# Patient Record
Sex: FEMALE | Race: WHITE | Employment: OTHER | URBAN - NONMETROPOLITAN AREA
[De-identification: names, ages, dates, MRNs, and addresses within clinical notes are randomized per-mention and may not be internally consistent; named-entity substitution may affect disease eponyms.]

---

## 2017-01-10 ENCOUNTER — OFFICE VISIT (OUTPATIENT)
Dept: INTERNAL MEDICINE CLINIC | Age: 77
End: 2017-01-10

## 2017-01-10 ENCOUNTER — TELEPHONE (OUTPATIENT)
Dept: INTERNAL MEDICINE CLINIC | Age: 77
End: 2017-01-10

## 2017-01-10 VITALS
SYSTOLIC BLOOD PRESSURE: 134 MMHG | OXYGEN SATURATION: 98 % | HEIGHT: 63 IN | DIASTOLIC BLOOD PRESSURE: 74 MMHG | HEART RATE: 68 BPM | WEIGHT: 135 LBS | BODY MASS INDEX: 23.92 KG/M2 | TEMPERATURE: 98.1 F | RESPIRATION RATE: 12 BRPM

## 2017-01-10 DIAGNOSIS — I49.3 PVCS (PREMATURE VENTRICULAR CONTRACTIONS): ICD-10-CM

## 2017-01-10 DIAGNOSIS — I82.4Z2 ACUTE DEEP VEIN THROMBOSIS (DVT) OF DISTAL VEIN OF LEFT LOWER EXTREMITY (HCC): ICD-10-CM

## 2017-01-10 DIAGNOSIS — N83.201 CYST OF RIGHT OVARY: ICD-10-CM

## 2017-01-10 DIAGNOSIS — K92.1 HEMATOCHEZIA: ICD-10-CM

## 2017-01-10 DIAGNOSIS — E78.2 MIXED HYPERLIPIDEMIA: ICD-10-CM

## 2017-01-10 DIAGNOSIS — K76.89 LIVER DYSFUNCTION: ICD-10-CM

## 2017-01-10 DIAGNOSIS — E03.9 ACQUIRED HYPOTHYROIDISM: ICD-10-CM

## 2017-01-10 DIAGNOSIS — I10 ESSENTIAL HYPERTENSION: ICD-10-CM

## 2017-01-10 DIAGNOSIS — A04.72 C. DIFFICILE DIARRHEA: ICD-10-CM

## 2017-01-10 DIAGNOSIS — R10.30 LOWER ABDOMINAL PAIN: Primary | ICD-10-CM

## 2017-01-10 PROCEDURE — 99214 OFFICE O/P EST MOD 30 MIN: CPT | Performed by: INTERNAL MEDICINE

## 2017-01-10 RX ORDER — ATORVASTATIN CALCIUM 10 MG/1
TABLET, FILM COATED ORAL
Qty: 90 TABLET | Refills: 1 | Status: SHIPPED | OUTPATIENT
Start: 2017-01-10 | End: 2017-05-16 | Stop reason: SDUPTHER

## 2017-01-10 RX ORDER — LEVOTHYROXINE SODIUM 0.05 MG/1
TABLET ORAL
Qty: 90 TABLET | Refills: 1 | Status: SHIPPED | OUTPATIENT
Start: 2017-01-10 | End: 2017-05-16 | Stop reason: SDUPTHER

## 2017-01-10 RX ORDER — BENAZEPRIL HYDROCHLORIDE 20 MG/1
TABLET ORAL
Qty: 90 TABLET | Refills: 1 | Status: SHIPPED | OUTPATIENT
Start: 2017-01-10 | End: 2017-05-16 | Stop reason: SDUPTHER

## 2017-01-10 RX ORDER — DICYCLOMINE HCL 20 MG
20 TABLET ORAL 2 TIMES DAILY PRN
Qty: 60 TABLET | Refills: 2 | Status: SHIPPED | OUTPATIENT
Start: 2017-01-10 | End: 2017-11-28 | Stop reason: SDUPTHER

## 2017-01-10 ASSESSMENT — ENCOUNTER SYMPTOMS
SHORTNESS OF BREATH: 0
VOMITING: 0
RESPIRATORY NEGATIVE: 1
ABDOMINAL PAIN: 1
COUGH: 0
DIARRHEA: 0
BLOOD IN STOOL: 1
EYES NEGATIVE: 1
NAUSEA: 0

## 2017-01-13 ENCOUNTER — HOSPITAL ENCOUNTER (OUTPATIENT)
Dept: ULTRASOUND IMAGING | Age: 77
Discharge: HOME | End: 2017-01-13
Attending: INTERNAL MEDICINE | Admitting: INTERNAL MEDICINE

## 2017-01-13 DIAGNOSIS — I82.4Z2 ACUTE DEEP VEIN THROMBOSIS OF LOWER LEG, LEFT (HCC): ICD-10-CM

## 2017-01-13 DIAGNOSIS — I82.4Z2 ACUTE EMBOLISM AND THROMBOSIS OF DEEP VEIN OF LEFT DISTAL LOWER EXTREMITY (HCC): ICD-10-CM

## 2017-02-11 ENCOUNTER — HOSPITAL ENCOUNTER (OUTPATIENT)
Dept: LAB | Age: 77
Discharge: HOME | End: 2017-02-11
Attending: INTERNAL MEDICINE | Admitting: INTERNAL MEDICINE

## 2017-02-11 LAB
ALBUMIN SERPL-MCNC: 4.3 GM/DL (ref 3.4–5)
ALP BLD-CCNC: 63 IU/L (ref 40–129)
ALT SERPL-CCNC: 23 U/L (ref 10–40)
ANION GAP SERPL CALCULATED.3IONS-SCNC: 15 MMOL/L (ref 4–16)
AST SERPL-CCNC: 30 IU/L (ref 15–37)
BASOPHILS ABSOLUTE: 0.1 K/CU MM
BASOPHILS RELATIVE PERCENT: 1 % (ref 0–1)
BILIRUB SERPL-MCNC: 0.6 MG/DL (ref 0–1)
BUN BLDV-MCNC: 18 MG/DL (ref 6–23)
CALCIUM SERPL-MCNC: 9.9 MG/DL (ref 8.3–10.6)
CHLORIDE BLD-SCNC: 102 MMOL/L (ref 99–110)
CHOLESTEROL: 137 MG/DL
CO2: 22 MMOL/L (ref 21–32)
CREAT SERPL-MCNC: 0.9 MG/DL (ref 0.6–1.1)
DIFFERENTIAL TYPE: ABNORMAL
EOSINOPHILS ABSOLUTE: 0.3 K/CU MM
EOSINOPHILS RELATIVE PERCENT: 5.6 % (ref 0–3)
GFR AFRICAN AMERICAN: >60 ML/MIN/1.73M2
GFR NON-AFRICAN AMERICAN: >60 ML/MIN/1.73M2
GLUCOSE FASTING: 103 MG/DL (ref 70–99)
HCT VFR BLD CALC: 41.6 % (ref 37–47)
HDLC SERPL-MCNC: 76 MG/DL
HEMOGLOBIN: 13.5 GM/DL (ref 12.5–16)
IMMATURE NEUTROPHIL %: 0 % (ref 0–0.43)
LDL CHOLESTEROL DIRECT: 61 MG/DL
LYMPHOCYTES ABSOLUTE: 2.2 K/CU MM
LYMPHOCYTES RELATIVE PERCENT: 41.9 % (ref 24–44)
MCH RBC QN AUTO: 29.9 PG (ref 27–31)
MCHC RBC AUTO-ENTMCNC: 32.5 % (ref 32–36)
MCV RBC AUTO: 92.2 FL (ref 78–100)
MONOCYTES ABSOLUTE: 0.3 K/CU MM
MONOCYTES RELATIVE PERCENT: 6.6 % (ref 0–4)
PDW BLD-RTO: 13.2 % (ref 11.7–14.9)
PLATELET # BLD: 218 K/CU MM (ref 140–440)
PMV BLD AUTO: 11.1 FL (ref 7.5–11.1)
POTASSIUM SERPL-SCNC: 4.4 MMOL/L (ref 3.5–5.1)
RBC # BLD: 4.51 M/CU MM (ref 4.2–5.4)
SEGMENTED NEUTROPHILS ABSOLUTE COUNT: 2.3 K/CU MM
SEGMENTED NEUTROPHILS RELATIVE PERCENT: 44.9 % (ref 36–66)
SODIUM BLD-SCNC: 139 MMOL/L (ref 135–145)
TOTAL IMMATURE NEUTOROPHIL: 0 K/CU MM
TOTAL PROTEIN: 7 GM/DL (ref 6.4–8.2)
TRIGL SERPL-MCNC: 62 MG/DL
WBC # BLD: 5.2 K/CU MM (ref 4–10.5)

## 2017-02-14 ENCOUNTER — OFFICE VISIT (OUTPATIENT)
Dept: INTERNAL MEDICINE CLINIC | Age: 77
End: 2017-02-14

## 2017-02-14 VITALS
DIASTOLIC BLOOD PRESSURE: 80 MMHG | RESPIRATION RATE: 16 BRPM | SYSTOLIC BLOOD PRESSURE: 124 MMHG | WEIGHT: 136.8 LBS | OXYGEN SATURATION: 98 % | BODY MASS INDEX: 24.23 KG/M2 | TEMPERATURE: 98 F | HEART RATE: 68 BPM

## 2017-02-14 DIAGNOSIS — E03.9 ACQUIRED HYPOTHYROIDISM: ICD-10-CM

## 2017-02-14 DIAGNOSIS — K52.9 COLITIS: ICD-10-CM

## 2017-02-14 DIAGNOSIS — E78.2 MIXED HYPERLIPIDEMIA: ICD-10-CM

## 2017-02-14 DIAGNOSIS — I10 ESSENTIAL HYPERTENSION: ICD-10-CM

## 2017-02-14 DIAGNOSIS — K76.89 LIVER DYSFUNCTION: ICD-10-CM

## 2017-02-14 DIAGNOSIS — Z23 NEED FOR PROPHYLACTIC VACCINATION AGAINST STREPTOCOCCUS PNEUMONIAE (PNEUMOCOCCUS) AND INFLUENZA: ICD-10-CM

## 2017-02-14 DIAGNOSIS — I82.4Z2 ACUTE DEEP VEIN THROMBOSIS (DVT) OF DISTAL VEIN OF LEFT LOWER EXTREMITY (HCC): Primary | ICD-10-CM

## 2017-02-14 PROCEDURE — G8484 FLU IMMUNIZE NO ADMIN: HCPCS | Performed by: INTERNAL MEDICINE

## 2017-02-14 PROCEDURE — 1090F PRES/ABSN URINE INCON ASSESS: CPT | Performed by: INTERNAL MEDICINE

## 2017-02-14 PROCEDURE — G8399 PT W/DXA RESULTS DOCUMENT: HCPCS | Performed by: INTERNAL MEDICINE

## 2017-02-14 PROCEDURE — G8420 CALC BMI NORM PARAMETERS: HCPCS | Performed by: INTERNAL MEDICINE

## 2017-02-14 PROCEDURE — G0009 ADMIN PNEUMOCOCCAL VACCINE: HCPCS | Performed by: INTERNAL MEDICINE

## 2017-02-14 PROCEDURE — 4040F PNEUMOC VAC/ADMIN/RCVD: CPT | Performed by: INTERNAL MEDICINE

## 2017-02-14 PROCEDURE — G8427 DOCREV CUR MEDS BY ELIG CLIN: HCPCS | Performed by: INTERNAL MEDICINE

## 2017-02-14 PROCEDURE — 90670 PCV13 VACCINE IM: CPT | Performed by: INTERNAL MEDICINE

## 2017-02-14 PROCEDURE — 1036F TOBACCO NON-USER: CPT | Performed by: INTERNAL MEDICINE

## 2017-02-14 PROCEDURE — 1123F ACP DISCUSS/DSCN MKR DOCD: CPT | Performed by: INTERNAL MEDICINE

## 2017-02-14 PROCEDURE — 99213 OFFICE O/P EST LOW 20 MIN: CPT | Performed by: INTERNAL MEDICINE

## 2017-02-14 ASSESSMENT — ENCOUNTER SYMPTOMS
COUGH: 0
DIARRHEA: 0
ABDOMINAL PAIN: 1
SHORTNESS OF BREATH: 0
HEMOPTYSIS: 0
CONSTIPATION: 0
RESPIRATORY NEGATIVE: 1
EYES NEGATIVE: 1

## 2017-05-16 ENCOUNTER — TELEPHONE (OUTPATIENT)
Dept: INTERNAL MEDICINE CLINIC | Age: 77
End: 2017-05-16

## 2017-05-16 ENCOUNTER — OFFICE VISIT (OUTPATIENT)
Dept: INTERNAL MEDICINE CLINIC | Age: 77
End: 2017-05-16

## 2017-05-16 VITALS
TEMPERATURE: 98 F | BODY MASS INDEX: 24.56 KG/M2 | SYSTOLIC BLOOD PRESSURE: 120 MMHG | RESPIRATION RATE: 12 BRPM | HEIGHT: 63 IN | HEART RATE: 64 BPM | OXYGEN SATURATION: 96 % | DIASTOLIC BLOOD PRESSURE: 64 MMHG | WEIGHT: 138.6 LBS

## 2017-05-16 DIAGNOSIS — I82.4Z2 ACUTE DEEP VEIN THROMBOSIS (DVT) OF DISTAL VEIN OF LEFT LOWER EXTREMITY (HCC): ICD-10-CM

## 2017-05-16 DIAGNOSIS — N83.201 CYST OF RIGHT OVARY: ICD-10-CM

## 2017-05-16 DIAGNOSIS — K52.9 COLITIS: ICD-10-CM

## 2017-05-16 DIAGNOSIS — E78.2 MIXED HYPERLIPIDEMIA: ICD-10-CM

## 2017-05-16 DIAGNOSIS — I10 ESSENTIAL HYPERTENSION: Primary | ICD-10-CM

## 2017-05-16 DIAGNOSIS — I49.3 PVCS (PREMATURE VENTRICULAR CONTRACTIONS): ICD-10-CM

## 2017-05-16 DIAGNOSIS — Z86.79 H/O MITRAL VALVE PROLAPSE: ICD-10-CM

## 2017-05-16 DIAGNOSIS — E03.9 ACQUIRED HYPOTHYROIDISM: ICD-10-CM

## 2017-05-16 DIAGNOSIS — K76.89 LIVER DYSFUNCTION: ICD-10-CM

## 2017-05-16 PROCEDURE — 1123F ACP DISCUSS/DSCN MKR DOCD: CPT | Performed by: INTERNAL MEDICINE

## 2017-05-16 PROCEDURE — G8399 PT W/DXA RESULTS DOCUMENT: HCPCS | Performed by: INTERNAL MEDICINE

## 2017-05-16 PROCEDURE — 1090F PRES/ABSN URINE INCON ASSESS: CPT | Performed by: INTERNAL MEDICINE

## 2017-05-16 PROCEDURE — G8427 DOCREV CUR MEDS BY ELIG CLIN: HCPCS | Performed by: INTERNAL MEDICINE

## 2017-05-16 PROCEDURE — 1036F TOBACCO NON-USER: CPT | Performed by: INTERNAL MEDICINE

## 2017-05-16 PROCEDURE — 99213 OFFICE O/P EST LOW 20 MIN: CPT | Performed by: INTERNAL MEDICINE

## 2017-05-16 PROCEDURE — 4040F PNEUMOC VAC/ADMIN/RCVD: CPT | Performed by: INTERNAL MEDICINE

## 2017-05-16 PROCEDURE — G8420 CALC BMI NORM PARAMETERS: HCPCS | Performed by: INTERNAL MEDICINE

## 2017-05-16 RX ORDER — BENAZEPRIL HYDROCHLORIDE 20 MG/1
TABLET ORAL
Qty: 90 TABLET | Refills: 1 | Status: SHIPPED | OUTPATIENT
Start: 2017-05-16 | End: 2017-08-22 | Stop reason: SDUPTHER

## 2017-05-16 RX ORDER — ATORVASTATIN CALCIUM 10 MG/1
TABLET, FILM COATED ORAL
Qty: 90 TABLET | Refills: 1 | Status: SHIPPED | OUTPATIENT
Start: 2017-05-16 | End: 2017-08-22 | Stop reason: SDUPTHER

## 2017-05-16 RX ORDER — LEVOTHYROXINE SODIUM 0.05 MG/1
TABLET ORAL
Qty: 90 TABLET | Refills: 1 | Status: SHIPPED | OUTPATIENT
Start: 2017-05-16 | End: 2017-08-22 | Stop reason: SDUPTHER

## 2017-05-16 ASSESSMENT — PATIENT HEALTH QUESTIONNAIRE - PHQ9
SUM OF ALL RESPONSES TO PHQ QUESTIONS 1-9: 0
SUM OF ALL RESPONSES TO PHQ9 QUESTIONS 1 & 2: 0
1. LITTLE INTEREST OR PLEASURE IN DOING THINGS: 0
2. FEELING DOWN, DEPRESSED OR HOPELESS: 0

## 2017-05-16 ASSESSMENT — ENCOUNTER SYMPTOMS
SHORTNESS OF BREATH: 0
RESPIRATORY NEGATIVE: 1
GASTROINTESTINAL NEGATIVE: 1
HEMOPTYSIS: 0
EYES NEGATIVE: 1
COUGH: 0

## 2017-06-05 ENCOUNTER — OFFICE VISIT (OUTPATIENT)
Dept: CARDIOLOGY CLINIC | Age: 77
End: 2017-06-05

## 2017-06-05 VITALS
BODY MASS INDEX: 21.69 KG/M2 | RESPIRATION RATE: 16 BRPM | HEIGHT: 66 IN | DIASTOLIC BLOOD PRESSURE: 70 MMHG | HEART RATE: 68 BPM | SYSTOLIC BLOOD PRESSURE: 122 MMHG | WEIGHT: 135 LBS

## 2017-06-05 DIAGNOSIS — Z86.79 H/O MITRAL VALVE PROLAPSE: ICD-10-CM

## 2017-06-05 DIAGNOSIS — I49.3 PVCS (PREMATURE VENTRICULAR CONTRACTIONS): ICD-10-CM

## 2017-06-05 DIAGNOSIS — R42 DIZZY SPELLS: Primary | ICD-10-CM

## 2017-06-05 DIAGNOSIS — R00.2 HEART PALPITATIONS: ICD-10-CM

## 2017-06-05 DIAGNOSIS — E78.2 MIXED HYPERLIPIDEMIA: ICD-10-CM

## 2017-06-05 DIAGNOSIS — I10 ESSENTIAL HYPERTENSION: ICD-10-CM

## 2017-06-05 PROCEDURE — 4040F PNEUMOC VAC/ADMIN/RCVD: CPT | Performed by: INTERNAL MEDICINE

## 2017-06-05 PROCEDURE — 93270 REMOTE 30 DAY ECG REV/REPORT: CPT | Performed by: INTERNAL MEDICINE

## 2017-06-05 PROCEDURE — 99214 OFFICE O/P EST MOD 30 MIN: CPT | Performed by: INTERNAL MEDICINE

## 2017-06-05 PROCEDURE — G8399 PT W/DXA RESULTS DOCUMENT: HCPCS | Performed by: INTERNAL MEDICINE

## 2017-06-05 PROCEDURE — 93000 ELECTROCARDIOGRAM COMPLETE: CPT | Performed by: INTERNAL MEDICINE

## 2017-06-05 PROCEDURE — 1036F TOBACCO NON-USER: CPT | Performed by: INTERNAL MEDICINE

## 2017-06-05 PROCEDURE — G8419 CALC BMI OUT NRM PARAM NOF/U: HCPCS | Performed by: INTERNAL MEDICINE

## 2017-06-05 PROCEDURE — 1123F ACP DISCUSS/DSCN MKR DOCD: CPT | Performed by: INTERNAL MEDICINE

## 2017-06-05 PROCEDURE — G8427 DOCREV CUR MEDS BY ELIG CLIN: HCPCS | Performed by: INTERNAL MEDICINE

## 2017-06-05 PROCEDURE — 1090F PRES/ABSN URINE INCON ASSESS: CPT | Performed by: INTERNAL MEDICINE

## 2017-07-10 PROCEDURE — 93272 ECG/REVIEW INTERPRET ONLY: CPT | Performed by: INTERNAL MEDICINE

## 2017-08-01 ENCOUNTER — PROCEDURE VISIT (OUTPATIENT)
Dept: CARDIOLOGY CLINIC | Age: 77
End: 2017-08-01

## 2017-08-01 ENCOUNTER — OFFICE VISIT (OUTPATIENT)
Dept: CARDIOLOGY CLINIC | Age: 77
End: 2017-08-01

## 2017-08-01 ENCOUNTER — HOSPITAL ENCOUNTER (OUTPATIENT)
Dept: GENERAL RADIOLOGY | Age: 77
Discharge: HOME | End: 2017-08-01
Attending: INTERNAL MEDICINE | Admitting: INTERNAL MEDICINE

## 2017-08-01 VITALS
BODY MASS INDEX: 24.27 KG/M2 | SYSTOLIC BLOOD PRESSURE: 120 MMHG | DIASTOLIC BLOOD PRESSURE: 60 MMHG | HEIGHT: 63 IN | RESPIRATION RATE: 16 BRPM | WEIGHT: 137 LBS | HEART RATE: 66 BPM

## 2017-08-01 DIAGNOSIS — Z86.79 H/O MITRAL VALVE PROLAPSE: ICD-10-CM

## 2017-08-01 DIAGNOSIS — R42 DIZZY SPELLS: ICD-10-CM

## 2017-08-01 DIAGNOSIS — I47.20 VENTRICULAR TACHYCARDIA: ICD-10-CM

## 2017-08-01 DIAGNOSIS — Z01.818 PRE-OP TESTING: ICD-10-CM

## 2017-08-01 DIAGNOSIS — R07.9 CHEST PAIN, UNSPECIFIED TYPE: Primary | ICD-10-CM

## 2017-08-01 DIAGNOSIS — R42 DIZZINESS: Primary | ICD-10-CM

## 2017-08-01 DIAGNOSIS — R00.2 HEART PALPITATIONS: Primary | ICD-10-CM

## 2017-08-01 DIAGNOSIS — R07.89 CHEST DISCOMFORT: ICD-10-CM

## 2017-08-01 DIAGNOSIS — R00.2 HEART PALPITATIONS: ICD-10-CM

## 2017-08-01 DIAGNOSIS — I49.3 PVCS (PREMATURE VENTRICULAR CONTRACTIONS): ICD-10-CM

## 2017-08-01 LAB
ANION GAP SERPL CALCULATED.3IONS-SCNC: 10 MMOL/L (ref 4–16)
APTT: 24.2 SECONDS (ref 24–40)
BASOPHILS ABSOLUTE: 0 K/CU MM
BASOPHILS RELATIVE PERCENT: 0.5 % (ref 0–1)
BUN BLDV-MCNC: 15 MG/DL (ref 6–23)
CALCIUM SERPL-MCNC: 9.6 MG/DL (ref 8.3–10.6)
CHLORIDE BLD-SCNC: 97 MMOL/L (ref 99–110)
CO2: 26 MMOL/L (ref 21–32)
CREAT SERPL-MCNC: 0.9 MG/DL (ref 0.6–1.1)
DIFFERENTIAL TYPE: ABNORMAL
EOSINOPHILS ABSOLUTE: 0.2 K/CU MM
EOSINOPHILS RELATIVE PERCENT: 2.1 % (ref 0–3)
GFR AFRICAN AMERICAN: >60 ML/MIN/1.73M2
GFR NON-AFRICAN AMERICAN: >60 ML/MIN/1.73M2
GLUCOSE BLD-MCNC: 91 MG/DL (ref 70–140)
HCT VFR BLD CALC: 39.2 % (ref 37–47)
HEMOGLOBIN: 13.1 GM/DL (ref 12.5–16)
IMMATURE NEUTROPHIL %: 0.1 % (ref 0–0.43)
INR BLD: 0.94 INDEX
LV EF: 58 %
LVEF MODALITY: NORMAL
LYMPHOCYTES ABSOLUTE: 2.5 K/CU MM
LYMPHOCYTES RELATIVE PERCENT: 33.7 % (ref 24–44)
MCH RBC QN AUTO: 30.3 PG (ref 27–31)
MCHC RBC AUTO-ENTMCNC: 33.4 % (ref 32–36)
MCV RBC AUTO: 90.7 FL (ref 78–100)
MONOCYTES ABSOLUTE: 0.5 K/CU MM
MONOCYTES RELATIVE PERCENT: 7.3 % (ref 0–4)
PDW BLD-RTO: 13.1 % (ref 11.7–14.9)
PLATELET # BLD: 196 K/CU MM (ref 140–440)
PMV BLD AUTO: 10.7 FL (ref 7.5–11.1)
POTASSIUM SERPL-SCNC: 4 MMOL/L (ref 3.5–5.1)
PROTHROMBIN TIME: 10.7 SECONDS (ref 10–14.3)
RBC # BLD: 4.32 M/CU MM (ref 4.2–5.4)
SEGMENTED NEUTROPHILS ABSOLUTE COUNT: 4.1 K/CU MM
SEGMENTED NEUTROPHILS RELATIVE PERCENT: 56.3 % (ref 36–66)
SODIUM BLD-SCNC: 133 MMOL/L (ref 135–145)
TOTAL IMMATURE NEUTOROPHIL: 0.01 K/CU MM
WBC # BLD: 7.3 K/CU MM (ref 4–10.5)

## 2017-08-01 PROCEDURE — G8399 PT W/DXA RESULTS DOCUMENT: HCPCS | Performed by: INTERNAL MEDICINE

## 2017-08-01 PROCEDURE — 93880 EXTRACRANIAL BILAT STUDY: CPT | Performed by: INTERNAL MEDICINE

## 2017-08-01 PROCEDURE — G8420 CALC BMI NORM PARAMETERS: HCPCS | Performed by: INTERNAL MEDICINE

## 2017-08-01 PROCEDURE — 99214 OFFICE O/P EST MOD 30 MIN: CPT | Performed by: INTERNAL MEDICINE

## 2017-08-01 PROCEDURE — 1090F PRES/ABSN URINE INCON ASSESS: CPT | Performed by: INTERNAL MEDICINE

## 2017-08-01 PROCEDURE — G8427 DOCREV CUR MEDS BY ELIG CLIN: HCPCS | Performed by: INTERNAL MEDICINE

## 2017-08-01 PROCEDURE — 93015 CV STRESS TEST SUPVJ I&R: CPT | Performed by: INTERNAL MEDICINE

## 2017-08-01 PROCEDURE — 1036F TOBACCO NON-USER: CPT | Performed by: INTERNAL MEDICINE

## 2017-08-01 PROCEDURE — 4040F PNEUMOC VAC/ADMIN/RCVD: CPT | Performed by: INTERNAL MEDICINE

## 2017-08-01 PROCEDURE — 93306 TTE W/DOPPLER COMPLETE: CPT | Performed by: INTERNAL MEDICINE

## 2017-08-01 PROCEDURE — 1123F ACP DISCUSS/DSCN MKR DOCD: CPT | Performed by: INTERNAL MEDICINE

## 2017-08-02 LAB
EKG ATRIAL RATE: 54 BPM
EKG DIAGNOSIS: NORMAL
EKG P AXIS: 81 DEGREES
EKG P-R INTERVAL: 188 MS
EKG Q-T INTERVAL: 458 MS
EKG QRS DURATION: 76 MS
EKG QTC CALCULATION (BAZETT): 434 MS
EKG R AXIS: 64 DEGREES
EKG T AXIS: 24 DEGREES
EKG VENTRICULAR RATE: 54 BPM

## 2017-08-16 ENCOUNTER — INITIAL CONSULT (OUTPATIENT)
Dept: CARDIOLOGY CLINIC | Age: 77
End: 2017-08-16

## 2017-08-16 VITALS
WEIGHT: 138 LBS | HEART RATE: 54 BPM | BODY MASS INDEX: 24.45 KG/M2 | HEIGHT: 63 IN | DIASTOLIC BLOOD PRESSURE: 70 MMHG | SYSTOLIC BLOOD PRESSURE: 122 MMHG

## 2017-08-16 DIAGNOSIS — R07.9 CHEST PAIN, UNSPECIFIED TYPE: ICD-10-CM

## 2017-08-16 DIAGNOSIS — I47.20 VT (VENTRICULAR TACHYCARDIA): Primary | ICD-10-CM

## 2017-08-16 DIAGNOSIS — Z82.49 FAMILY HISTORY OF CORONARY ARTERY DISEASE: ICD-10-CM

## 2017-08-16 PROCEDURE — 1090F PRES/ABSN URINE INCON ASSESS: CPT | Performed by: INTERNAL MEDICINE

## 2017-08-16 PROCEDURE — G8399 PT W/DXA RESULTS DOCUMENT: HCPCS | Performed by: INTERNAL MEDICINE

## 2017-08-16 PROCEDURE — 99204 OFFICE O/P NEW MOD 45 MIN: CPT | Performed by: INTERNAL MEDICINE

## 2017-08-16 PROCEDURE — 1036F TOBACCO NON-USER: CPT | Performed by: INTERNAL MEDICINE

## 2017-08-16 PROCEDURE — G8598 ASA/ANTIPLAT THER USED: HCPCS | Performed by: INTERNAL MEDICINE

## 2017-08-16 PROCEDURE — 4040F PNEUMOC VAC/ADMIN/RCVD: CPT | Performed by: INTERNAL MEDICINE

## 2017-08-16 PROCEDURE — G8420 CALC BMI NORM PARAMETERS: HCPCS | Performed by: INTERNAL MEDICINE

## 2017-08-16 PROCEDURE — 1123F ACP DISCUSS/DSCN MKR DOCD: CPT | Performed by: INTERNAL MEDICINE

## 2017-08-16 PROCEDURE — G8427 DOCREV CUR MEDS BY ELIG CLIN: HCPCS | Performed by: INTERNAL MEDICINE

## 2017-08-18 ENCOUNTER — HOSPITAL ENCOUNTER (OUTPATIENT)
Dept: LAB | Age: 77
Discharge: HOME | End: 2017-08-18
Attending: INTERNAL MEDICINE | Admitting: INTERNAL MEDICINE

## 2017-08-18 LAB
ALBUMIN SERPL-MCNC: 4.2 GM/DL (ref 3.4–5)
ALP BLD-CCNC: 59 IU/L (ref 40–129)
ALT SERPL-CCNC: 19 U/L (ref 10–40)
ANION GAP SERPL CALCULATED.3IONS-SCNC: 5 MMOL/L (ref 4–16)
AST SERPL-CCNC: 26 IU/L (ref 15–37)
BILIRUB SERPL-MCNC: 0.3 MG/DL (ref 0–1)
BUN BLDV-MCNC: 19 MG/DL (ref 6–23)
CALCIUM SERPL-MCNC: 9.5 MG/DL (ref 8.3–10.6)
CHLORIDE BLD-SCNC: 102 MMOL/L (ref 99–110)
CHOLESTEROL, FASTING: 146 MG/DL
CO2: 32 MMOL/L (ref 21–32)
CREAT SERPL-MCNC: 0.9 MG/DL (ref 0.6–1.1)
GFR AFRICAN AMERICAN: >60 ML/MIN/1.73M2
GFR NON-AFRICAN AMERICAN: >60 ML/MIN/1.73M2
GLUCOSE FASTING: 106 MG/DL (ref 70–99)
HDLC SERPL-MCNC: 64 MG/DL
LDL CHOLESTEROL DIRECT: 71 MG/DL
POTASSIUM SERPL-SCNC: 4.2 MMOL/L (ref 3.5–5.1)
SODIUM BLD-SCNC: 139 MMOL/L (ref 135–145)
TOTAL PROTEIN: 7.1 GM/DL (ref 6.4–8.2)
TRIGLYCERIDE, FASTING: 83 MG/DL
TSH HIGH SENSITIVITY: 2.71 UIU/ML (ref 0.27–4.2)

## 2017-08-20 ASSESSMENT — ENCOUNTER SYMPTOMS
CONSTIPATION: 0
ABDOMINAL PAIN: 0
BLOOD IN STOOL: 0
NAUSEA: 0
DIARRHEA: 0
BACK PAIN: 0
COLOR CHANGE: 0
SHORTNESS OF BREATH: 0
EYE PAIN: 0
PHOTOPHOBIA: 0
WHEEZING: 0
COUGH: 0
CHEST TIGHTNESS: 0
VOMITING: 0

## 2017-08-22 ENCOUNTER — OFFICE VISIT (OUTPATIENT)
Dept: CARDIOLOGY CLINIC | Age: 77
End: 2017-08-22

## 2017-08-22 ENCOUNTER — OFFICE VISIT (OUTPATIENT)
Dept: INTERNAL MEDICINE CLINIC | Age: 77
End: 2017-08-22

## 2017-08-22 ENCOUNTER — NURSE ONLY (OUTPATIENT)
Dept: CARDIOLOGY CLINIC | Age: 77
End: 2017-08-22

## 2017-08-22 VITALS
SYSTOLIC BLOOD PRESSURE: 122 MMHG | HEIGHT: 63 IN | HEART RATE: 64 BPM | RESPIRATION RATE: 14 BRPM | BODY MASS INDEX: 24.27 KG/M2 | WEIGHT: 137 LBS | DIASTOLIC BLOOD PRESSURE: 74 MMHG

## 2017-08-22 VITALS
BODY MASS INDEX: 24.31 KG/M2 | SYSTOLIC BLOOD PRESSURE: 118 MMHG | TEMPERATURE: 98.6 F | HEIGHT: 63 IN | RESPIRATION RATE: 12 BRPM | WEIGHT: 137.2 LBS | OXYGEN SATURATION: 98 % | DIASTOLIC BLOOD PRESSURE: 70 MMHG | HEART RATE: 64 BPM

## 2017-08-22 DIAGNOSIS — I49.3 PVCS (PREMATURE VENTRICULAR CONTRACTIONS): ICD-10-CM

## 2017-08-22 DIAGNOSIS — K76.89 LIVER DYSFUNCTION: ICD-10-CM

## 2017-08-22 DIAGNOSIS — R00.2 HEART PALPITATIONS: ICD-10-CM

## 2017-08-22 DIAGNOSIS — I47.20 VENTRICULAR TACHYCARDIA: Primary | ICD-10-CM

## 2017-08-22 DIAGNOSIS — N83.201 CYST OF RIGHT OVARY: ICD-10-CM

## 2017-08-22 DIAGNOSIS — Z86.79 H/O MITRAL VALVE PROLAPSE: ICD-10-CM

## 2017-08-22 DIAGNOSIS — I47.20 VENTRICULAR TACHYCARDIA: ICD-10-CM

## 2017-08-22 DIAGNOSIS — E78.2 MIXED HYPERLIPIDEMIA: ICD-10-CM

## 2017-08-22 DIAGNOSIS — I10 ESSENTIAL HYPERTENSION: ICD-10-CM

## 2017-08-22 DIAGNOSIS — K52.9 COLITIS: ICD-10-CM

## 2017-08-22 DIAGNOSIS — E03.9 ACQUIRED HYPOTHYROIDISM: ICD-10-CM

## 2017-08-22 PROBLEM — R07.89 CHEST DISCOMFORT: Status: RESOLVED | Noted: 2017-08-01 | Resolved: 2017-08-22

## 2017-08-22 PROCEDURE — 4040F PNEUMOC VAC/ADMIN/RCVD: CPT | Performed by: INTERNAL MEDICINE

## 2017-08-22 PROCEDURE — 1036F TOBACCO NON-USER: CPT | Performed by: INTERNAL MEDICINE

## 2017-08-22 PROCEDURE — 1090F PRES/ABSN URINE INCON ASSESS: CPT | Performed by: INTERNAL MEDICINE

## 2017-08-22 PROCEDURE — G8399 PT W/DXA RESULTS DOCUMENT: HCPCS | Performed by: INTERNAL MEDICINE

## 2017-08-22 PROCEDURE — G8427 DOCREV CUR MEDS BY ELIG CLIN: HCPCS | Performed by: INTERNAL MEDICINE

## 2017-08-22 PROCEDURE — 93270 REMOTE 30 DAY ECG REV/REPORT: CPT | Performed by: INTERNAL MEDICINE

## 2017-08-22 PROCEDURE — G8420 CALC BMI NORM PARAMETERS: HCPCS | Performed by: INTERNAL MEDICINE

## 2017-08-22 PROCEDURE — 1123F ACP DISCUSS/DSCN MKR DOCD: CPT | Performed by: INTERNAL MEDICINE

## 2017-08-22 PROCEDURE — 99214 OFFICE O/P EST MOD 30 MIN: CPT | Performed by: INTERNAL MEDICINE

## 2017-08-22 RX ORDER — BENAZEPRIL HYDROCHLORIDE 20 MG/1
TABLET ORAL
Qty: 90 TABLET | Refills: 1 | Status: SHIPPED | OUTPATIENT
Start: 2017-08-22 | End: 2018-02-27 | Stop reason: SDUPTHER

## 2017-08-22 RX ORDER — ATORVASTATIN CALCIUM 10 MG/1
TABLET, FILM COATED ORAL
Qty: 90 TABLET | Refills: 1 | Status: SHIPPED | OUTPATIENT
Start: 2017-08-22 | End: 2018-02-27 | Stop reason: SDUPTHER

## 2017-08-22 RX ORDER — METOPROLOL SUCCINATE 25 MG/1
25 TABLET, EXTENDED RELEASE ORAL DAILY
Qty: 30 TABLET | Refills: 3 | Status: SHIPPED | OUTPATIENT
Start: 2017-08-22 | End: 2017-11-28 | Stop reason: ALTCHOICE

## 2017-08-22 RX ORDER — LEVOTHYROXINE SODIUM 0.05 MG/1
TABLET ORAL
Qty: 90 TABLET | Refills: 1 | Status: SHIPPED | OUTPATIENT
Start: 2017-08-22 | End: 2018-02-27 | Stop reason: SDUPTHER

## 2017-08-22 ASSESSMENT — ENCOUNTER SYMPTOMS
NAUSEA: 0
EYES NEGATIVE: 1
VOMITING: 0
COUGH: 0
HEARTBURN: 0

## 2017-09-26 PROCEDURE — 93272 ECG/REVIEW INTERPRET ONLY: CPT | Performed by: INTERNAL MEDICINE

## 2017-10-02 ENCOUNTER — PROCEDURE VISIT (OUTPATIENT)
Dept: CARDIOLOGY CLINIC | Age: 77
End: 2017-10-02

## 2017-10-02 ENCOUNTER — OFFICE VISIT (OUTPATIENT)
Dept: CARDIOLOGY CLINIC | Age: 77
End: 2017-10-02

## 2017-10-02 ENCOUNTER — TELEPHONE (OUTPATIENT)
Dept: CARDIOLOGY CLINIC | Age: 77
End: 2017-10-02

## 2017-10-02 VITALS
DIASTOLIC BLOOD PRESSURE: 84 MMHG | RESPIRATION RATE: 16 BRPM | SYSTOLIC BLOOD PRESSURE: 164 MMHG | HEIGHT: 63 IN | WEIGHT: 138 LBS | BODY MASS INDEX: 24.45 KG/M2 | HEART RATE: 50 BPM

## 2017-10-02 DIAGNOSIS — R42 DIZZY SPELLS: ICD-10-CM

## 2017-10-02 DIAGNOSIS — R94.31 ABNORMAL EKG: Primary | ICD-10-CM

## 2017-10-02 DIAGNOSIS — I47.20 VENTRICULAR TACHYCARDIA: Primary | ICD-10-CM

## 2017-10-02 DIAGNOSIS — I47.20 VENTRICULAR TACHYCARDIA: ICD-10-CM

## 2017-10-02 DIAGNOSIS — I10 ESSENTIAL HYPERTENSION: ICD-10-CM

## 2017-10-02 DIAGNOSIS — R00.2 HEART PALPITATIONS: ICD-10-CM

## 2017-10-02 DIAGNOSIS — Z86.79 H/O MITRAL VALVE PROLAPSE: ICD-10-CM

## 2017-10-02 PROCEDURE — 4040F PNEUMOC VAC/ADMIN/RCVD: CPT | Performed by: INTERNAL MEDICINE

## 2017-10-02 PROCEDURE — G8420 CALC BMI NORM PARAMETERS: HCPCS | Performed by: INTERNAL MEDICINE

## 2017-10-02 PROCEDURE — 1123F ACP DISCUSS/DSCN MKR DOCD: CPT | Performed by: INTERNAL MEDICINE

## 2017-10-02 PROCEDURE — 93015 CV STRESS TEST SUPVJ I&R: CPT | Performed by: INTERNAL MEDICINE

## 2017-10-02 PROCEDURE — 99214 OFFICE O/P EST MOD 30 MIN: CPT | Performed by: INTERNAL MEDICINE

## 2017-10-02 PROCEDURE — G8399 PT W/DXA RESULTS DOCUMENT: HCPCS | Performed by: INTERNAL MEDICINE

## 2017-10-02 PROCEDURE — G8427 DOCREV CUR MEDS BY ELIG CLIN: HCPCS | Performed by: INTERNAL MEDICINE

## 2017-10-02 PROCEDURE — G8484 FLU IMMUNIZE NO ADMIN: HCPCS | Performed by: INTERNAL MEDICINE

## 2017-10-02 PROCEDURE — 1090F PRES/ABSN URINE INCON ASSESS: CPT | Performed by: INTERNAL MEDICINE

## 2017-10-02 PROCEDURE — 1036F TOBACCO NON-USER: CPT | Performed by: INTERNAL MEDICINE

## 2017-10-02 NOTE — ASSESSMENT & PLAN NOTE
She continues to have exercise-induced runs of nonsustained ventricular tachycardia at a relatively low level of exercise. She is to follow up with electrophysiology for further evaluation and recommendations. She is counseled to avoid any exercise activity until definitive therapies performed and then we will repeat this test on follow-up before she can resume exercising life activities.

## 2017-10-02 NOTE — MR AVS SNAPSHOT
164/84 (Site: Left Arm, Position: Sitting, Cuff Size: Medium Adult) 50 16 5' 3\" (1.6 m) 138 lb (62.6 kg) 24.45 kg/m2    Smoking Status                   Never Smoker           Instructions    See Dr Marycarmen Mayes for VT ablation or alternative approach ASAP. Continue current cardiovascular medications which have been reviewed and discussed individually with you. Avoid any exercise for now. Appropriate prescriptions if needed on this visit are addressed. After visit summery is provided. Questions answered and patient verbalizes understanding. Follow up in office in 3 months, sooner if needed. Medications and Orders      Your Current Medications Are              levothyroxine (SYNTHROID) 50 MCG tablet TAKE 1 TABLET BY MOUTH DAILY    benazepril (LOTENSIN) 20 MG tablet TAKE 1 TABLET BY MOUTH DAILY    atorvastatin (LIPITOR) 10 MG tablet TAKE 1 TABLET EVERY DAY    metoprolol succinate (TOPROL XL) 25 MG extended release tablet Take 1 tablet by mouth daily    dicyclomine (BENTYL) 20 MG tablet Take 1 tablet by mouth 2 times daily as needed (take prn cramping)    lactobacillus (CULTURELLE) capsule Take 1 capsule by mouth 2 times daily (with meals)    aspirin 81 MG tablet Take 81 mg by mouth 2 times daily    ascorbic acid (VITAMIN C) 500 MG tablet Take 500 mg by mouth daily    Coenzyme Q10 (CO Q 10) 100 MG CAPS Take by mouth daily     Cholecalciferol (VITAMIN D3) 2000 UNITS CAPS Take 1 capsule by mouth daily     tretinoin (RETIN-A) 0.1 % cream     Multiple Vitamins-Minerals (MULTIVITAMIN & MINERAL PO) Take 1 tablet by mouth daily    calcium carbonate (OSCAL) 500 MG TABS tablet Take 500 mg by mouth daily    nitroGLYCERIN (NITROSTAT) 0.4 MG SL tablet Place 1 tablet under the tongue every 5 minutes as needed for Chest pain      Allergies              Amiodarone     Severe hair loss    Amoxicillin Other (See Comments)    Pt states she got C-diff. , so she doesn't like atb         Additional Information Basic Information     Date Of Birth Sex Race Ethnicity Preferred Language    1940 Female White Non-/Non  English      Problem List as of 10/2/2017  Date Reviewed: 10/2/2017                Ventricular tachycardia (HCC)    Dizzy spells    Acute deep vein thrombosis (DVT) of distal vein of left lower extremity ([address]    Cyst of right ovary    Heart palpitations    Essential hypertension    Mixed hyperlipidemia    PVCs (premature ventricular contractions)    H/O mitral valve prolapse    Liver dysfunction    Acquired hypothyroidism    Colitis      Immunizations as of 10/2/2017     Name Date    Influenza, High Dose 11/18/2016, 10/14/2015    Pneumococcal 13-valent Conjugate ([id number]) 2/14/2017    Pneumococcal Polysaccharide ([id number]) 8/3/2015    Zoster 9/25/2014      Preventive Care        Date Due    Tetanus Combination Vaccine (1 - Tdap) 7/19/1957    Yearly Flu Vaccine (1) 9/1/2017    Cholesterol Screening 8/18/2022            MyChart Signup           Our records indicate that you have declined MyChart signup.

## 2017-10-03 ENCOUNTER — TELEPHONE (OUTPATIENT)
Dept: CARDIOLOGY CLINIC | Age: 77
End: 2017-10-03

## 2017-10-11 ENCOUNTER — OFFICE VISIT (OUTPATIENT)
Dept: CARDIOLOGY CLINIC | Age: 77
End: 2017-10-11

## 2017-10-11 VITALS
BODY MASS INDEX: 24.59 KG/M2 | SYSTOLIC BLOOD PRESSURE: 120 MMHG | HEART RATE: 49 BPM | WEIGHT: 138.8 LBS | DIASTOLIC BLOOD PRESSURE: 80 MMHG | HEIGHT: 63 IN

## 2017-10-11 DIAGNOSIS — M79.605 PAIN OF LEFT LOWER EXTREMITY: ICD-10-CM

## 2017-10-11 DIAGNOSIS — I47.20 VENTRICULAR TACHYCARDIA: Primary | ICD-10-CM

## 2017-10-11 PROCEDURE — 1123F ACP DISCUSS/DSCN MKR DOCD: CPT | Performed by: INTERNAL MEDICINE

## 2017-10-11 PROCEDURE — G8399 PT W/DXA RESULTS DOCUMENT: HCPCS | Performed by: INTERNAL MEDICINE

## 2017-10-11 PROCEDURE — 1090F PRES/ABSN URINE INCON ASSESS: CPT | Performed by: INTERNAL MEDICINE

## 2017-10-11 PROCEDURE — G8484 FLU IMMUNIZE NO ADMIN: HCPCS | Performed by: INTERNAL MEDICINE

## 2017-10-11 PROCEDURE — G8420 CALC BMI NORM PARAMETERS: HCPCS | Performed by: INTERNAL MEDICINE

## 2017-10-11 PROCEDURE — G8427 DOCREV CUR MEDS BY ELIG CLIN: HCPCS | Performed by: INTERNAL MEDICINE

## 2017-10-11 PROCEDURE — 4040F PNEUMOC VAC/ADMIN/RCVD: CPT | Performed by: INTERNAL MEDICINE

## 2017-10-11 PROCEDURE — 93000 ELECTROCARDIOGRAM COMPLETE: CPT | Performed by: INTERNAL MEDICINE

## 2017-10-11 PROCEDURE — 1036F TOBACCO NON-USER: CPT | Performed by: INTERNAL MEDICINE

## 2017-10-11 PROCEDURE — 99214 OFFICE O/P EST MOD 30 MIN: CPT | Performed by: INTERNAL MEDICINE

## 2017-10-11 NOTE — PATIENT INSTRUCTIONS
Please remember to bring all medication bottles or a medication list with you to your appointment. If you have any questions, please call our office at [phone].

## 2017-10-11 NOTE — LETTER
Yue Alexander     PROCEDURE: Electrophysiology Study/Ventricular Tachycardia Ablation       Date of Procedure: 10/19/17  Time: 1:00   Arrival Time: 11:00    Patient Name: Chaim Blanton   : 1940   MRN# [id number]    HOSPITAL: [facility])    Call to [place] at [phone]  2 days before your procedure    x Please have blood work and chest-x-ray done 10/17 at [facility]     x Please do not have anything by mouth after midnight prior to or 8 hours before the procedure.    x You may take your medications with a sip of water in the morning before your procedure or take them with you unless listed below. Patient Signature:  _______________________  Staff: Linda Alexander     PROCEDURE:  Electrophysiology Study/Ventricular Tachycardia Ablation      Date of Procedure: 10/19/17 Time: 1:00 Arrival Time: 11:00    Patient Name: Chaim Blanton   : 1940   MRN# [id number]    Day of Procedure Cath Lab Holding area Pre op Orders:    ? IV peripheral saline lock (preferred left arm). ? Type & Screen STAT on arrival.  ? Insert Ward catheter (male patients >>please use coude ward catheter). ? Female patients <=48years of age >> Please do urine HCG test.  ? Diabetic patients >> Accu check Blood sugar check. ? Document home medications in EPIC and include date and time of last dose.  ? NPO  ? Notify Dr. Nas Lang of abnormal lab results. ? Chest Prep> Clip hair anterior chest   ? Groin Prep> Clip hair bilateral groins.       Physician Signature:_____________________________Date:___________                                                                   [facility]) Informed Consent for Anesthesia/Sedation, Surgery, Invasive Procedures, and other High-risk Interventions and Medication use This includes appropriate portions of my body. My identity will not be revealed. ? I consent to release of my social security number and other identifying information to [employer] (FDA), and the supplier/, if I receive tissue, a device, or implant. This is to track the tissue, device, or implant for defect, recall, infection, etc.                 ? Use of blood and/or blood products, if needed, through my hospital stay. My practitioner has advised me of the risks of using, risks of not using, benefits, side effects, and alternatives. ___ I do NOT want Blood or Blood products given. (Complete separate  refusal form)    Code Status (zayra one):  ___ I do NOT HAVE a DNR order. I am a Full-code.   I will receive CPR, intubation,  chest compressions, medications, and/or other life saving measures if I have a  cardiac or respiratory arrest.    ___ I have a Do Not Resuscitate (DNR)order.   (zayra one below)  ___  I rescind my DNR for surgery and immediate post-operative period through Phase 2 recovery. This means, for that time period, I will be a Full-code and receive CPR, intubation, chest compressions, medications, and/or other life saving measures, if I have a cardiac or respiratory arrest.    ___ I WANT to keep my DNR in effect during my procedure(s) and immediate post-operative recovery period through Phase 2 recovery. (Complete separate refusal form)     This form has been fully explained to me. I understand its contents.       Patients Signature: ___________________________Date: ________  Time: ________    If patient unable to sign, has engaged the [address] [place] [address] [place], is a minor, or has a court-appointed Guardian:  [employer] Representative Name (Print):  ____________________________________      Relationship ([place] one):    Guardian   Parent    Spouse    HCPOA   Child   Sibling  Next-of-Kin Friend

## 2017-10-11 NOTE — PROGRESS NOTES
Electrophysiology fu Note      Reason for consultation: VT    Chief complaint :  Palpitations, dizziness    Referring physician:  Milagro Borges      Primary care physician: Barb Cazares MD      History of Present Illness: This visit (10/4/2017      Chief Complaint   Patient presents with    Tachycardia     Pt is here for VT. Pt states she woke up today with dizziness and nausea. Pt states it felt like her heart was fluttering. Pt denies chest pain, shortness of breath, and edema. Previous visit:    Chief Complaint   Patient presents with    Irregular Heart Beat     Pt here to see Ziggy Gonzalez for exercise-induced frequent complex Ventricular arrhythmia. Pt also had 30-day event monitor done in June 2017.  Chest Pain     Pt states her chest pain, dizziness, & palpitations are not everyday, just occas. Her chest pain feels like a \"fullnesss\" to mid-chest to under left breast, but does not radiate to any other areas. Pt had cardiac cath done per  on 8/3/17. Past medical history:   Past Medical History:   Diagnosis Date    Acute deep vein thrombosis (DVT) of distal vein of left lower extremity ([address] 10/23/2016    DVT involving left peroneal, posterior and anterior tibial veins 10/16 Treated for at least 3 months and repeat venous doppler was negative and was discontinued     Axillary adenopathy 8/28/2014    US of axilla 6/14 showed benign lymph nodes    C. difficile diarrhea 11/18/2016    Treated few times and now is normal    CAD (coronary artery disease)     Chest discomfort 8/1/2017    Colitis 12/13    EGD,COLONOSCOPY, SBFT normal. Dr. Allyssa Khoury    Cyst of right ovary 9/6/2016    Seen Dr Pablo Rosa and had several US per pt.  Dizzy spells 6/5/2017    Family history of coronary artery disease     H/O 24 hour EKG monitoring 7/27/09    NSR, few PVCs and occasional couplet noted (total 1007 PVCs), no pauses noted.      H/O colonoscopy 10/09, 12/13 Maternal Grandmother        Social history :  reports that she has never smoked. She has never used smokeless tobacco. She reports that she drinks alcohol. She reports that she does not use drugs. Allergies   Allergen Reactions    Amiodarone      Severe hair loss    Amoxicillin Other (See Comments)     Pt states she got C-diff. , so she doesn't like atb       Current Outpatient Prescriptions on File Prior to Visit   Medication Sig Dispense Refill    levothyroxine (SYNTHROID) 50 MCG tablet TAKE 1 TABLET BY MOUTH DAILY 90 tablet 1    benazepril (LOTENSIN) 20 MG tablet TAKE 1 TABLET BY MOUTH DAILY 90 tablet 1    atorvastatin (LIPITOR) 10 MG tablet TAKE 1 TABLET EVERY DAY 90 tablet 1    metoprolol succinate (TOPROL XL) 25 MG extended release tablet Take 1 tablet by mouth daily 30 tablet 3    dicyclomine (BENTYL) 20 MG tablet Take 1 tablet by mouth 2 times daily as needed (take prn cramping) 60 tablet 2    lactobacillus (CULTURELLE) capsule Take 1 capsule by mouth 2 times daily (with meals) 30 capsule 0    aspirin 81 MG tablet Take 81 mg by mouth 2 times daily      ascorbic acid (VITAMIN C) 500 MG tablet Take 500 mg by mouth daily      Coenzyme Q10 (CO Q 10) 100 MG CAPS Take by mouth daily       Cholecalciferol (VITAMIN D3) 2000 UNITS CAPS Take 1 capsule by mouth daily       tretinoin (RETIN-A) 0.1 % cream   5    Multiple Vitamins-Minerals (MULTIVITAMIN & MINERAL PO) Take 1 tablet by mouth daily      calcium carbonate (OSCAL) 500 MG TABS tablet Take 500 mg by mouth daily      nitroGLYCERIN (NITROSTAT) 0.4 MG SL tablet Place 1 tablet under the tongue every 5 minutes as needed for Chest pain 25 tablet 3     No current facility-administered medications on file prior to visit. Review of Systems:   Review of Systems   Constitutional: Negative for activity change, chills, fatigue and fever. HENT: Negative for congestion, ear pain and tinnitus.     Eyes: Negative for photophobia, pain and visual disturbance. Respiratory: Negative for cough, chest tightness, shortness of breath and wheezing. Cardiovascular: Positive for palpitations. denies chest pain  Gastrointestinal: Negative for abdominal pain, blood in stool, constipation, diarrhea, nausea and vomiting. Endocrine: Negative for cold intolerance and heat intolerance. Genitourinary: Negative for dysuria, flank pain and hematuria. Musculoskeletal: Positive for arthralgias. Negative for back pain, myalgias and neck stiffness. Skin: Negative for color change and rash. Allergic/Immunologic: Negative for food allergies. Neurological: Negative for , numbness and headaches. Hematological: Does not bruise/bleed easily. Psychiatric/Behavioral: Negative for agitation, behavioral problems and confusion. Physical Examination:    /80   Pulse (!) 49   Ht 5' 3\" (1.6 m)   Wt 138 lb 12.8 oz (63 kg)   BMI 24.59 kg/m²    Wt Readings from Last 3 Encounters:   10/11/17 138 lb 12.8 oz (63 kg)   10/02/17 138 lb (62.6 kg)   08/22/17 137 lb (62.1 kg)     Body mass index is 24.59 kg/m². Physical Exam   Constitutional: She is oriented to person, place, and time and well-developed, well-nourished, and in no distress. HENT:   Head: Normocephalic and atraumatic. Eyes: Conjunctivae and EOM are normal. Pupils are equal, round, and reactive to light. Right eye exhibits no discharge. Neck: Normal range of motion. No JVD present. No thyromegaly present. Cardiovascular: Normal rate, regular rhythm and normal heart sounds. Exam reveals no friction rub. No murmur heard. Pulmonary/Chest: Effort normal and breath sounds normal. No stridor. No respiratory distress. She has no wheezes. Abdominal: Soft. Bowel sounds are normal. She exhibits no distension. There is no tenderness. Musculoskeletal: Normal range of motion. She exhibits no edema or tenderness. Neurological: She is alert and oriented to person, place, and time.  She displays

## 2017-10-17 ENCOUNTER — HOSPITAL ENCOUNTER (OUTPATIENT)
Dept: GENERAL RADIOLOGY | Age: 77
Discharge: HOME | End: 2017-10-17
Attending: INTERNAL MEDICINE | Admitting: INTERNAL MEDICINE

## 2017-10-17 ENCOUNTER — PROCEDURE VISIT (OUTPATIENT)
Dept: CARDIOLOGY CLINIC | Age: 77
End: 2017-10-17

## 2017-10-17 DIAGNOSIS — M79.605 LEFT LEG PAIN: Primary | ICD-10-CM

## 2017-10-17 DIAGNOSIS — Z01.818 PRE-OP EXAMINATION: ICD-10-CM

## 2017-10-17 LAB
ANION GAP SERPL CALCULATED.3IONS-SCNC: 13 MMOL/L (ref 4–16)
APTT: 24.1 SECONDS (ref 21.2–33)
BUN BLDV-MCNC: 16 MG/DL (ref 6–23)
CALCIUM SERPL-MCNC: 9.6 MG/DL (ref 8.3–10.6)
CHLORIDE BLD-SCNC: 100 MMOL/L (ref 99–110)
CO2: 26 MMOL/L (ref 21–32)
CREAT SERPL-MCNC: 0.8 MG/DL (ref 0.6–1.1)
GFR AFRICAN AMERICAN: >60 ML/MIN/1.73M2
GFR NON-AFRICAN AMERICAN: >60 ML/MIN/1.73M2
GLUCOSE BLD-MCNC: 84 MG/DL (ref 70–140)
HCT VFR BLD CALC: 41.2 % (ref 37–47)
HEMOGLOBIN: 13.8 GM/DL (ref 12.5–16)
INR BLD: 0.91 INDEX
MAGNESIUM: 2.1 MG/DL (ref 1.8–2.4)
MCH RBC QN AUTO: 30.7 PG (ref 27–31)
MCHC RBC AUTO-ENTMCNC: 33.5 % (ref 32–36)
MCV RBC AUTO: 91.8 FL (ref 78–100)
PDW BLD-RTO: 13.1 % (ref 11.7–14.9)
PHOSPHORUS: 4.1 MG/DL (ref 2.5–4.9)
PLATELET # BLD: 205 K/CU MM (ref 140–440)
PMV BLD AUTO: 11 FL (ref 7.5–11.1)
POTASSIUM SERPL-SCNC: 4.5 MMOL/L (ref 3.5–5.1)
PROTHROMBIN TIME: 10.3 SECONDS (ref 9.12–12.5)
RBC # BLD: 4.49 M/CU MM (ref 4.2–5.4)
SODIUM BLD-SCNC: 139 MMOL/L (ref 135–145)
WBC # BLD: 7 K/CU MM (ref 4–10.5)

## 2017-10-17 PROCEDURE — 93970 EXTREMITY STUDY: CPT | Performed by: INTERNAL MEDICINE

## 2017-10-19 ENCOUNTER — TELEPHONE (OUTPATIENT)
Dept: CARDIOLOGY CLINIC | Age: 77
End: 2017-10-19

## 2017-10-19 ENCOUNTER — HOSPITAL ENCOUNTER (OUTPATIENT)
Dept: CARDIAC CATH/INVASIVE PROCEDURES | Age: 77
Discharge: HOME | End: 2017-11-17
Attending: INTERNAL MEDICINE | Admitting: INTERNAL MEDICINE

## 2017-11-09 ENCOUNTER — OFFICE VISIT (OUTPATIENT)
Dept: INTERNAL MEDICINE CLINIC | Age: 77
End: 2017-11-09

## 2017-11-09 VITALS
OXYGEN SATURATION: 98 % | BODY MASS INDEX: 24.45 KG/M2 | HEIGHT: 63 IN | DIASTOLIC BLOOD PRESSURE: 72 MMHG | WEIGHT: 138 LBS | RESPIRATION RATE: 12 BRPM | SYSTOLIC BLOOD PRESSURE: 128 MMHG | HEART RATE: 64 BPM | TEMPERATURE: 98 F

## 2017-11-09 DIAGNOSIS — H10.31 ACUTE CONJUNCTIVITIS OF RIGHT EYE, UNSPECIFIED ACUTE CONJUNCTIVITIS TYPE: Primary | ICD-10-CM

## 2017-11-09 PROCEDURE — G8484 FLU IMMUNIZE NO ADMIN: HCPCS | Performed by: INTERNAL MEDICINE

## 2017-11-09 PROCEDURE — G8420 CALC BMI NORM PARAMETERS: HCPCS | Performed by: INTERNAL MEDICINE

## 2017-11-09 PROCEDURE — 99213 OFFICE O/P EST LOW 20 MIN: CPT | Performed by: INTERNAL MEDICINE

## 2017-11-09 PROCEDURE — 1036F TOBACCO NON-USER: CPT | Performed by: INTERNAL MEDICINE

## 2017-11-09 PROCEDURE — 1090F PRES/ABSN URINE INCON ASSESS: CPT | Performed by: INTERNAL MEDICINE

## 2017-11-09 PROCEDURE — G8399 PT W/DXA RESULTS DOCUMENT: HCPCS | Performed by: INTERNAL MEDICINE

## 2017-11-09 PROCEDURE — 1123F ACP DISCUSS/DSCN MKR DOCD: CPT | Performed by: INTERNAL MEDICINE

## 2017-11-09 PROCEDURE — G8427 DOCREV CUR MEDS BY ELIG CLIN: HCPCS | Performed by: INTERNAL MEDICINE

## 2017-11-09 PROCEDURE — 4040F PNEUMOC VAC/ADMIN/RCVD: CPT | Performed by: INTERNAL MEDICINE

## 2017-11-09 RX ORDER — GENTAMICIN SULFATE 3 MG/ML
1 SOLUTION/ DROPS OPHTHALMIC 3 TIMES DAILY
Qty: 1 BOTTLE | Refills: 0 | Status: SHIPPED | OUTPATIENT
Start: 2017-11-09 | End: 2017-11-19

## 2017-11-09 NOTE — PROGRESS NOTES
Eagle Gruber  Patient's  is 1940  Seen in office on 2017      SUBJECTIVE:  Abby Parish is a 76 y. o.year old female presents today   Chief Complaint   Patient presents with    Conjunctivitis     x2d     Pt had redness of right eye  Was matted and had to clean with warm clothe  Pt used Naphcon A and Zaditor OTC  Taking medications regularly. No side effects noted. ROS    OBJECTIVE: /72 (Site: Left Arm, Position: Sitting)   Pulse 64   Temp 98 °F (36.7 °C)   Resp 12   Ht 5' 3\" (1.6 m) Comment: w shoes  Wt 138 lb (62.6 kg)   SpO2 98%   BMI 24.45 kg/m²     Wt Readings from Last 3 Encounters:   17 138 lb (62.6 kg)   10/11/17 138 lb 12.8 oz (63 kg)   10/02/17 138 lb (62.6 kg)      GENERAL:  Alert, oriented, pleasant, in no apparent distress. HEENT:  Conjunctiva mildy injected. Mildly matted. no scleral icterus. ENT clear. NECK:  Supple. No jugular venous distention noted. No masses felt,  CARDIOVASCULAR:  Normal S1 and S2  Bradycardia   PULMONARY:  No respiratory distress. No wheezes or rales. ABDOMEN:  Soft and non-tender,no masses  or organomegaly. EXTREMITIES:  No cyanosis, clubbing, or significant edema. SKIN: Skin is warm and dry. NEUROLOGICAL:  Cranial nerves II through XII are grossly intact. IMPRESSION:    Encounter Diagnoses   Name Primary?  Acute conjunctivitis of right eye, unspecified acute conjunctivitis type Yes       ASSESSMENT/PLAN:    1. Conjunctivitis right eye: warm compresses. Will give gentamycin. If worse call. Orders Placed This Encounter   Medications    gentamicin (GARAMYCIN) 0.3 % ophthalmic solution     Sig: Place 1 drop into both eyes 3 times daily for 10 days     Dispense:  1 Bottle     Refill:  0               Mediations reviewed with the patient. Continue current medications. Appropriate prescriptions are addressed. After visit summery provided. Follow up as directed sooner if needed.   Questions answered and patient verbalizes diarrhea 11/18/2016    Treated few times and now is normal    CAD (coronary artery disease)     Chest discomfort 8/1/2017    Colitis 12/13    EGD,COLONOSCOPY, SBFT normal. Dr. Shirin Suh    Cyst of right ovary 9/6/2016    Seen Dr Nitin Arevalo and had several US per pt.  Dizzy spells 6/5/2017    Family history of coronary artery disease     H/O 24 hour EKG monitoring 7/27/09    NSR, few PVCs and occasional couplet noted (total 1007 PVCs), no pauses noted.  H/O colonoscopy 10/09, 12/13    f/u in 5 years    H/O echocardiogram 9/17/12    Normal LV size and function, mild mitral and tricuspid regurg., EF 60%.  H/O mitral valve prolapse 2003    MR mild    H/O myocardial perfusion scan 12/16/14    Stress Cardiolite. Normal perfusion in the distribution of all coronaries, normal LV size and function, EF 70%.  H/O screening mammography 8/11    Dr Roseanna Kevin History of cardiac monitoring 06/05/2017    Abnormal-SR with complex ventricular arrhythmias and frequent PVCs. No symptoms during marked bradycardia or during the fastest rate of 143 with nonsustained three-beat runs of ventricular tachycardia.  History of cardiac monitoring 08/23/2017    Abnormal event monitor findings suggestive of predominantly sinus bradycardia with isolated and frequent low-grade ventricular ectopy with symptoms reported.      Hx of Doppler ultrasound 10/17/2017    duplex venous doppler-minimal reflux noted in the left CFV    Hyperlipidemia     Hypertension     Hypothyroidism     Liver dysfunction     liver bx 10/09,mild steaotosis    Osteoarthritis of cervical spine 6/7/2016    Ovarian cyst     Papanicolaou smear 09/14/2016    Dr. Roseanna Kevin PVCs (premature ventricular contractions)     symptomatic frequent PVCs, sees Dr. Indiana Alejandre Ventricular tachycardia ([facility] [facility] 8/1/2017    Exercise induced 8/1/17     Past Surgical History:   Procedure Laterality Date    APPENDECTOMY      BREAST BIOPSY      CATARACT REMOVAL Bilateral    

## 2017-11-13 ENCOUNTER — TELEPHONE (OUTPATIENT)
Dept: INTERNAL MEDICINE CLINIC | Age: 77
End: 2017-11-13

## 2017-11-28 ENCOUNTER — OFFICE VISIT (OUTPATIENT)
Dept: INTERNAL MEDICINE CLINIC | Age: 77
End: 2017-11-28

## 2017-11-28 VITALS
HEART RATE: 72 BPM | DIASTOLIC BLOOD PRESSURE: 72 MMHG | WEIGHT: 136.4 LBS | RESPIRATION RATE: 16 BRPM | OXYGEN SATURATION: 99 % | SYSTOLIC BLOOD PRESSURE: 114 MMHG | BODY MASS INDEX: 24.16 KG/M2 | TEMPERATURE: 97.8 F

## 2017-11-28 DIAGNOSIS — I10 ESSENTIAL HYPERTENSION: ICD-10-CM

## 2017-11-28 DIAGNOSIS — K76.89 LIVER DYSFUNCTION: ICD-10-CM

## 2017-11-28 DIAGNOSIS — E03.9 ACQUIRED HYPOTHYROIDISM: ICD-10-CM

## 2017-11-28 DIAGNOSIS — E78.2 MIXED HYPERLIPIDEMIA: ICD-10-CM

## 2017-11-28 DIAGNOSIS — K52.9 COLITIS: ICD-10-CM

## 2017-11-28 DIAGNOSIS — K21.9 GASTROESOPHAGEAL REFLUX DISEASE WITHOUT ESOPHAGITIS: ICD-10-CM

## 2017-11-28 DIAGNOSIS — I49.3 PVCS (PREMATURE VENTRICULAR CONTRACTIONS): ICD-10-CM

## 2017-11-28 PROCEDURE — 1123F ACP DISCUSS/DSCN MKR DOCD: CPT | Performed by: INTERNAL MEDICINE

## 2017-11-28 PROCEDURE — G8427 DOCREV CUR MEDS BY ELIG CLIN: HCPCS | Performed by: INTERNAL MEDICINE

## 2017-11-28 PROCEDURE — G8420 CALC BMI NORM PARAMETERS: HCPCS | Performed by: INTERNAL MEDICINE

## 2017-11-28 PROCEDURE — 1090F PRES/ABSN URINE INCON ASSESS: CPT | Performed by: INTERNAL MEDICINE

## 2017-11-28 PROCEDURE — G8484 FLU IMMUNIZE NO ADMIN: HCPCS | Performed by: INTERNAL MEDICINE

## 2017-11-28 PROCEDURE — 99213 OFFICE O/P EST LOW 20 MIN: CPT | Performed by: INTERNAL MEDICINE

## 2017-11-28 PROCEDURE — G8399 PT W/DXA RESULTS DOCUMENT: HCPCS | Performed by: INTERNAL MEDICINE

## 2017-11-28 PROCEDURE — 1036F TOBACCO NON-USER: CPT | Performed by: INTERNAL MEDICINE

## 2017-11-28 PROCEDURE — 4040F PNEUMOC VAC/ADMIN/RCVD: CPT | Performed by: INTERNAL MEDICINE

## 2017-11-28 RX ORDER — NITROGLYCERIN 0.4 MG/1
0.4 TABLET SUBLINGUAL EVERY 5 MIN PRN
Qty: 25 TABLET | Refills: 3 | Status: SHIPPED | OUTPATIENT
Start: 2017-11-28 | End: 2018-08-30 | Stop reason: SDUPTHER

## 2017-11-28 RX ORDER — DICYCLOMINE HCL 20 MG
20 TABLET ORAL 2 TIMES DAILY PRN
Qty: 60 TABLET | Refills: 2 | Status: SHIPPED | OUTPATIENT
Start: 2017-11-28 | End: 2018-02-27 | Stop reason: DRUGHIGH

## 2017-11-28 ASSESSMENT — ENCOUNTER SYMPTOMS
RESPIRATORY NEGATIVE: 1
EYES NEGATIVE: 1
GASTROINTESTINAL NEGATIVE: 1
COUGH: 0

## 2017-11-28 NOTE — ASSESSMENT & PLAN NOTE
Pt has seen [facility] for 2nd opinion and Dr Samantha Hunter did not recommend EP study

## 2017-11-30 ENCOUNTER — TELEPHONE (OUTPATIENT)
Dept: CARDIOLOGY CLINIC | Age: 77
End: 2017-11-30

## 2018-02-27 ENCOUNTER — OFFICE VISIT (OUTPATIENT)
Dept: INTERNAL MEDICINE CLINIC | Age: 78
End: 2018-02-27

## 2018-02-27 VITALS
DIASTOLIC BLOOD PRESSURE: 78 MMHG | WEIGHT: 139 LBS | SYSTOLIC BLOOD PRESSURE: 112 MMHG | RESPIRATION RATE: 16 BRPM | OXYGEN SATURATION: 94 % | BODY MASS INDEX: 24.62 KG/M2 | HEART RATE: 60 BPM | TEMPERATURE: 97.9 F

## 2018-02-27 DIAGNOSIS — I10 ESSENTIAL HYPERTENSION: Primary | ICD-10-CM

## 2018-02-27 DIAGNOSIS — K52.9 COLITIS: ICD-10-CM

## 2018-02-27 DIAGNOSIS — I49.3 PVCS (PREMATURE VENTRICULAR CONTRACTIONS): ICD-10-CM

## 2018-02-27 DIAGNOSIS — E03.9 ACQUIRED HYPOTHYROIDISM: ICD-10-CM

## 2018-02-27 DIAGNOSIS — E78.2 MIXED HYPERLIPIDEMIA: ICD-10-CM

## 2018-02-27 DIAGNOSIS — K21.9 GASTROESOPHAGEAL REFLUX DISEASE WITHOUT ESOPHAGITIS: ICD-10-CM

## 2018-02-27 DIAGNOSIS — F41.1 ANXIETY NEUROSIS: ICD-10-CM

## 2018-02-27 DIAGNOSIS — K76.89 LIVER DYSFUNCTION: ICD-10-CM

## 2018-02-27 PROCEDURE — 1123F ACP DISCUSS/DSCN MKR DOCD: CPT | Performed by: INTERNAL MEDICINE

## 2018-02-27 PROCEDURE — G8427 DOCREV CUR MEDS BY ELIG CLIN: HCPCS | Performed by: INTERNAL MEDICINE

## 2018-02-27 PROCEDURE — 1090F PRES/ABSN URINE INCON ASSESS: CPT | Performed by: INTERNAL MEDICINE

## 2018-02-27 PROCEDURE — 1036F TOBACCO NON-USER: CPT | Performed by: INTERNAL MEDICINE

## 2018-02-27 PROCEDURE — G8399 PT W/DXA RESULTS DOCUMENT: HCPCS | Performed by: INTERNAL MEDICINE

## 2018-02-27 PROCEDURE — G8484 FLU IMMUNIZE NO ADMIN: HCPCS | Performed by: INTERNAL MEDICINE

## 2018-02-27 PROCEDURE — 99213 OFFICE O/P EST LOW 20 MIN: CPT | Performed by: INTERNAL MEDICINE

## 2018-02-27 PROCEDURE — 4040F PNEUMOC VAC/ADMIN/RCVD: CPT | Performed by: INTERNAL MEDICINE

## 2018-02-27 PROCEDURE — G8420 CALC BMI NORM PARAMETERS: HCPCS | Performed by: INTERNAL MEDICINE

## 2018-02-27 RX ORDER — LEVOTHYROXINE SODIUM 0.05 MG/1
TABLET ORAL
Qty: 90 TABLET | Refills: 1 | Status: SHIPPED | OUTPATIENT
Start: 2018-02-27 | End: 2018-06-07 | Stop reason: SDUPTHER

## 2018-02-27 RX ORDER — BUSPIRONE HYDROCHLORIDE 5 MG/1
5 TABLET ORAL 2 TIMES DAILY
Qty: 60 TABLET | Refills: 0 | Status: SHIPPED | OUTPATIENT
Start: 2018-02-27 | End: 2018-03-26 | Stop reason: SDUPTHER

## 2018-02-27 RX ORDER — BENAZEPRIL HYDROCHLORIDE 20 MG/1
TABLET ORAL
Qty: 90 TABLET | Refills: 1 | Status: SHIPPED | OUTPATIENT
Start: 2018-02-27 | End: 2018-06-07 | Stop reason: SDUPTHER

## 2018-02-27 RX ORDER — DICYCLOMINE HCL 20 MG
20 TABLET ORAL 2 TIMES DAILY PRN
Qty: 60 TABLET | Refills: 1 | Status: CANCELLED | OUTPATIENT
Start: 2018-02-27

## 2018-02-27 RX ORDER — ATORVASTATIN CALCIUM 10 MG/1
TABLET, FILM COATED ORAL
Qty: 90 TABLET | Refills: 1 | Status: SHIPPED | OUTPATIENT
Start: 2018-02-27 | End: 2018-06-07 | Stop reason: SDUPTHER

## 2018-02-27 RX ORDER — DICYCLOMINE HYDROCHLORIDE 10 MG/1
10 CAPSULE ORAL 2 TIMES DAILY PRN
Qty: 60 CAPSULE | Refills: 3 | Status: SHIPPED | OUTPATIENT
Start: 2018-02-27 | End: 2018-11-06 | Stop reason: SDUPTHER

## 2018-02-27 ASSESSMENT — ENCOUNTER SYMPTOMS
EYES NEGATIVE: 1
COUGH: 0
RESPIRATORY NEGATIVE: 1
GASTROINTESTINAL NEGATIVE: 1
HEMOPTYSIS: 0
BLURRED VISION: 0

## 2018-02-27 NOTE — PROGRESS NOTES
Current Outpatient Prescriptions   Medication Sig Dispense Refill    nitroGLYCERIN (NITROSTAT) 0.4 MG SL tablet Place 1 tablet under the tongue every 5 minutes as needed for Chest pain 25 tablet 3    levothyroxine (SYNTHROID) 50 MCG tablet TAKE 1 TABLET BY MOUTH DAILY 90 tablet 1    benazepril (LOTENSIN) 20 MG tablet TAKE 1 TABLET BY MOUTH DAILY 90 tablet 1    atorvastatin (LIPITOR) 10 MG tablet TAKE 1 TABLET EVERY DAY 90 tablet 1    lactobacillus (CULTURELLE) capsule Take 1 capsule by mouth 2 times daily (with meals) 30 capsule 0    aspirin 81 MG tablet Take 81 mg by mouth 2 times daily      ascorbic acid (VITAMIN C) 500 MG tablet Take 500 mg by mouth daily      Coenzyme Q10 (CO Q 10) 100 MG CAPS Take by mouth daily       Cholecalciferol (VITAMIN D3) 2000 UNITS CAPS Take 1 capsule by mouth daily       Multiple Vitamins-Minerals (MULTIVITAMIN & MINERAL PO) Take 1 tablet by mouth daily      calcium carbonate (OSCAL) 500 MG TABS tablet Take 500 mg by mouth daily      dicyclomine (BENTYL) 20 MG tablet Take 1 tablet by mouth 2 times daily as needed (take prn cramping) 60 tablet 2     No current facility-administered medications for this visit. Past Medical History:   Diagnosis Date    Acute deep vein thrombosis (DVT) of distal vein of left lower extremity ([address] 10/23/2016    DVT involving left peroneal, posterior and anterior tibial veins 10/16 Treated for at least 3 months and repeat venous doppler was negative and was discontinued     Axillary adenopathy 8/28/2014    US of axilla 6/14 showed benign lymph nodes    C. difficile diarrhea 11/18/2016    Treated few times and now is normal    CAD (coronary artery disease)     Chest discomfort 8/1/2017    Colitis 12/13    EGD,COLONOSCOPY, SBFT normal. Dr. Ivis Mejias    Cyst of right ovary 9/6/2016    Seen Dr Mary More and had several US per pt.     Dizzy spells 6/5/2017    Family history of coronary artery disease     H/O 24 hour EKG monitoring 7/27/09

## 2018-03-26 DIAGNOSIS — F41.9 ANXIETY: Primary | ICD-10-CM

## 2018-03-26 RX ORDER — BUSPIRONE HYDROCHLORIDE 5 MG/1
5 TABLET ORAL 2 TIMES DAILY
Qty: 60 TABLET | Refills: 0 | Status: SHIPPED | OUTPATIENT
Start: 2018-03-26 | End: 2018-04-28

## 2018-05-09 ENCOUNTER — OFFICE VISIT (OUTPATIENT)
Dept: INTERNAL MEDICINE CLINIC | Age: 78
End: 2018-05-09

## 2018-05-09 VITALS
HEART RATE: 56 BPM | DIASTOLIC BLOOD PRESSURE: 76 MMHG | SYSTOLIC BLOOD PRESSURE: 112 MMHG | OXYGEN SATURATION: 98 % | TEMPERATURE: 98.1 F | RESPIRATION RATE: 12 BRPM | BODY MASS INDEX: 24.91 KG/M2 | WEIGHT: 140.6 LBS

## 2018-05-09 DIAGNOSIS — I49.3 PVC (PREMATURE VENTRICULAR CONTRACTION): ICD-10-CM

## 2018-05-09 DIAGNOSIS — I47.20 VENTRICULAR TACHYCARDIA: ICD-10-CM

## 2018-05-09 DIAGNOSIS — I10 ESSENTIAL HYPERTENSION: ICD-10-CM

## 2018-05-09 DIAGNOSIS — K52.9 COLITIS: ICD-10-CM

## 2018-05-09 DIAGNOSIS — K21.9 GASTROESOPHAGEAL REFLUX DISEASE WITHOUT ESOPHAGITIS: ICD-10-CM

## 2018-05-09 DIAGNOSIS — R10.13 EPIGASTRIC PAIN: Primary | ICD-10-CM

## 2018-05-09 DIAGNOSIS — E03.9 ACQUIRED HYPOTHYROIDISM: ICD-10-CM

## 2018-05-09 DIAGNOSIS — E78.2 MIXED HYPERLIPIDEMIA: ICD-10-CM

## 2018-05-09 DIAGNOSIS — K76.89 LIVER DYSFUNCTION: ICD-10-CM

## 2018-05-09 PROCEDURE — 1036F TOBACCO NON-USER: CPT | Performed by: INTERNAL MEDICINE

## 2018-05-09 PROCEDURE — G8420 CALC BMI NORM PARAMETERS: HCPCS | Performed by: INTERNAL MEDICINE

## 2018-05-09 PROCEDURE — 1123F ACP DISCUSS/DSCN MKR DOCD: CPT | Performed by: INTERNAL MEDICINE

## 2018-05-09 PROCEDURE — 1111F DSCHRG MED/CURRENT MED MERGE: CPT | Performed by: INTERNAL MEDICINE

## 2018-05-09 PROCEDURE — 99214 OFFICE O/P EST MOD 30 MIN: CPT | Performed by: INTERNAL MEDICINE

## 2018-05-09 PROCEDURE — G8399 PT W/DXA RESULTS DOCUMENT: HCPCS | Performed by: INTERNAL MEDICINE

## 2018-05-09 PROCEDURE — 4040F PNEUMOC VAC/ADMIN/RCVD: CPT | Performed by: INTERNAL MEDICINE

## 2018-05-09 PROCEDURE — G8427 DOCREV CUR MEDS BY ELIG CLIN: HCPCS | Performed by: INTERNAL MEDICINE

## 2018-05-09 PROCEDURE — 1090F PRES/ABSN URINE INCON ASSESS: CPT | Performed by: INTERNAL MEDICINE

## 2018-05-13 ASSESSMENT — ENCOUNTER SYMPTOMS
DOUBLE VISION: 0
COUGH: 0
EYES NEGATIVE: 1
RESPIRATORY NEGATIVE: 1
BLURRED VISION: 0
GASTROINTESTINAL NEGATIVE: 1

## 2018-06-01 ENCOUNTER — HOSPITAL ENCOUNTER (OUTPATIENT)
Dept: LAB | Age: 78
Discharge: HOME | End: 2018-06-01
Attending: INTERNAL MEDICINE | Admitting: INTERNAL MEDICINE

## 2018-06-01 LAB
ALBUMIN SERPL-MCNC: 4.4 GM/DL (ref 3.4–5)
ALP BLD-CCNC: 55 IU/L (ref 40–129)
ALT SERPL-CCNC: 18 U/L (ref 10–40)
AMYLASE: 52 U/L (ref 25–115)
ANION GAP SERPL CALCULATED.3IONS-SCNC: 12 MMOL/L (ref 4–16)
AST SERPL-CCNC: 25 IU/L (ref 15–37)
BILIRUB SERPL-MCNC: 0.5 MG/DL (ref 0–1)
BUN BLDV-MCNC: 16 MG/DL (ref 6–23)
CALCIUM SERPL-MCNC: 9.6 MG/DL (ref 8.3–10.6)
CHLORIDE BLD-SCNC: 102 MMOL/L (ref 99–110)
CHOLESTEROL, FASTING: 142 MG/DL
CO2: 29 MMOL/L (ref 21–32)
CREAT SERPL-MCNC: 1 MG/DL (ref 0.6–1.1)
GFR AFRICAN AMERICAN: >60 ML/MIN/1.73M2
GFR NON-AFRICAN AMERICAN: 53 ML/MIN/1.73M2
GLUCOSE FASTING: 89 MG/DL (ref 70–99)
HDLC SERPL-MCNC: 66 MG/DL
LDL CHOLESTEROL DIRECT: 70 MG/DL
LIPASE: 26 IU/L (ref 13–60)
POTASSIUM SERPL-SCNC: 4.4 MMOL/L (ref 3.5–5.1)
SODIUM BLD-SCNC: 143 MMOL/L (ref 135–145)
TOTAL PROTEIN: 7.1 GM/DL (ref 6.4–8.2)
TRIGLYCERIDE, FASTING: 92 MG/DL
TSH HIGH SENSITIVITY: 3.46 UIU/ML (ref 0.27–4.2)

## 2018-06-07 ENCOUNTER — OFFICE VISIT (OUTPATIENT)
Dept: INTERNAL MEDICINE CLINIC | Age: 78
End: 2018-06-07

## 2018-06-07 VITALS
DIASTOLIC BLOOD PRESSURE: 78 MMHG | WEIGHT: 140.2 LBS | TEMPERATURE: 98.5 F | OXYGEN SATURATION: 97 % | SYSTOLIC BLOOD PRESSURE: 122 MMHG | HEART RATE: 60 BPM | BODY MASS INDEX: 24.84 KG/M2 | RESPIRATION RATE: 12 BRPM

## 2018-06-07 DIAGNOSIS — E78.2 MIXED HYPERLIPIDEMIA: ICD-10-CM

## 2018-06-07 DIAGNOSIS — R10.13 EPIGASTRIC PAIN: Primary | ICD-10-CM

## 2018-06-07 DIAGNOSIS — I47.20 VENTRICULAR TACHYCARDIA: ICD-10-CM

## 2018-06-07 DIAGNOSIS — I10 ESSENTIAL HYPERTENSION: ICD-10-CM

## 2018-06-07 DIAGNOSIS — E03.9 ACQUIRED HYPOTHYROIDISM: ICD-10-CM

## 2018-06-07 DIAGNOSIS — I49.3 PVCS (PREMATURE VENTRICULAR CONTRACTIONS): ICD-10-CM

## 2018-06-07 PROCEDURE — 1090F PRES/ABSN URINE INCON ASSESS: CPT | Performed by: INTERNAL MEDICINE

## 2018-06-07 PROCEDURE — G8399 PT W/DXA RESULTS DOCUMENT: HCPCS | Performed by: INTERNAL MEDICINE

## 2018-06-07 PROCEDURE — 1123F ACP DISCUSS/DSCN MKR DOCD: CPT | Performed by: INTERNAL MEDICINE

## 2018-06-07 PROCEDURE — 3288F FALL RISK ASSESSMENT DOCD: CPT | Performed by: INTERNAL MEDICINE

## 2018-06-07 PROCEDURE — 4040F PNEUMOC VAC/ADMIN/RCVD: CPT | Performed by: INTERNAL MEDICINE

## 2018-06-07 PROCEDURE — 99213 OFFICE O/P EST LOW 20 MIN: CPT | Performed by: INTERNAL MEDICINE

## 2018-06-07 PROCEDURE — G8510 SCR DEP NEG, NO PLAN REQD: HCPCS | Performed by: INTERNAL MEDICINE

## 2018-06-07 PROCEDURE — 1036F TOBACCO NON-USER: CPT | Performed by: INTERNAL MEDICINE

## 2018-06-07 PROCEDURE — G8427 DOCREV CUR MEDS BY ELIG CLIN: HCPCS | Performed by: INTERNAL MEDICINE

## 2018-06-07 PROCEDURE — G8420 CALC BMI NORM PARAMETERS: HCPCS | Performed by: INTERNAL MEDICINE

## 2018-06-07 RX ORDER — BENAZEPRIL HYDROCHLORIDE 20 MG/1
TABLET ORAL
Qty: 90 TABLET | Refills: 1 | Status: SHIPPED | OUTPATIENT
Start: 2018-06-07 | End: 2018-10-26 | Stop reason: SDUPTHER

## 2018-06-07 RX ORDER — LEVOTHYROXINE SODIUM 0.05 MG/1
TABLET ORAL
Qty: 90 TABLET | Refills: 1 | Status: SHIPPED | OUTPATIENT
Start: 2018-06-07 | End: 2018-10-26 | Stop reason: SDUPTHER

## 2018-06-07 RX ORDER — ATORVASTATIN CALCIUM 10 MG/1
TABLET, FILM COATED ORAL
Qty: 90 TABLET | Refills: 1 | Status: SHIPPED | OUTPATIENT
Start: 2018-06-07 | End: 2018-10-26 | Stop reason: SDUPTHER

## 2018-06-07 ASSESSMENT — PATIENT HEALTH QUESTIONNAIRE - PHQ9
SUM OF ALL RESPONSES TO PHQ9 QUESTIONS 1 & 2: 0
1. LITTLE INTEREST OR PLEASURE IN DOING THINGS: 0
SUM OF ALL RESPONSES TO PHQ QUESTIONS 1-9: 0
2. FEELING DOWN, DEPRESSED OR HOPELESS: 0

## 2018-08-14 ENCOUNTER — OFFICE VISIT (OUTPATIENT)
Dept: INTERNAL MEDICINE CLINIC | Age: 78
End: 2018-08-14

## 2018-08-14 VITALS
OXYGEN SATURATION: 98 % | HEART RATE: 57 BPM | WEIGHT: 138 LBS | DIASTOLIC BLOOD PRESSURE: 62 MMHG | TEMPERATURE: 98 F | SYSTOLIC BLOOD PRESSURE: 118 MMHG | BODY MASS INDEX: 24.45 KG/M2 | RESPIRATION RATE: 12 BRPM

## 2018-08-14 DIAGNOSIS — I10 ESSENTIAL HYPERTENSION: ICD-10-CM

## 2018-08-14 DIAGNOSIS — R10.84 GENERALIZED ABDOMINAL PAIN: Primary | ICD-10-CM

## 2018-08-14 DIAGNOSIS — K21.9 GASTROESOPHAGEAL REFLUX DISEASE WITHOUT ESOPHAGITIS: ICD-10-CM

## 2018-08-14 DIAGNOSIS — E78.2 MIXED HYPERLIPIDEMIA: ICD-10-CM

## 2018-08-14 DIAGNOSIS — E03.9 ACQUIRED HYPOTHYROIDISM: ICD-10-CM

## 2018-08-14 PROCEDURE — 99213 OFFICE O/P EST LOW 20 MIN: CPT | Performed by: INTERNAL MEDICINE

## 2018-08-14 PROCEDURE — 1036F TOBACCO NON-USER: CPT | Performed by: INTERNAL MEDICINE

## 2018-08-14 PROCEDURE — 1123F ACP DISCUSS/DSCN MKR DOCD: CPT | Performed by: INTERNAL MEDICINE

## 2018-08-14 PROCEDURE — G8420 CALC BMI NORM PARAMETERS: HCPCS | Performed by: INTERNAL MEDICINE

## 2018-08-14 PROCEDURE — 4040F PNEUMOC VAC/ADMIN/RCVD: CPT | Performed by: INTERNAL MEDICINE

## 2018-08-14 PROCEDURE — 1090F PRES/ABSN URINE INCON ASSESS: CPT | Performed by: INTERNAL MEDICINE

## 2018-08-14 PROCEDURE — 1101F PT FALLS ASSESS-DOCD LE1/YR: CPT | Performed by: INTERNAL MEDICINE

## 2018-08-14 PROCEDURE — G8427 DOCREV CUR MEDS BY ELIG CLIN: HCPCS | Performed by: INTERNAL MEDICINE

## 2018-08-14 PROCEDURE — G8399 PT W/DXA RESULTS DOCUMENT: HCPCS | Performed by: INTERNAL MEDICINE

## 2018-08-14 RX ORDER — OMEPRAZOLE 20 MG/1
20 CAPSULE, DELAYED RELEASE ORAL 2 TIMES DAILY
Qty: 30 CAPSULE | Refills: 0 | Status: SHIPPED | OUTPATIENT
Start: 2018-08-14 | End: 2018-08-30 | Stop reason: ALTCHOICE

## 2018-08-14 ASSESSMENT — ENCOUNTER SYMPTOMS
ABDOMINAL PAIN: 1
COUGH: 0
HEARTBURN: 0
BLOOD IN STOOL: 0
HEMOPTYSIS: 0
EYES NEGATIVE: 1
RESPIRATORY NEGATIVE: 1
NAUSEA: 0
CONSTIPATION: 1
DIARRHEA: 0
VOMITING: 0

## 2018-08-14 NOTE — PROGRESS NOTES
significant edema. SKIN: Skin is warm and dry. NEUROLOGICAL:  Cranial nerves II through XII are grossly intact. Right elbow. Has mild swelling. No redness    IMPRESSION:    Encounter Diagnoses   Name Primary?  Generalized abdominal pain Yes    Essential hypertension     Mixed hyperlipidemia     Acquired hypothyroidism     Gastroesophageal reflux disease without esophagitis        ASSESSMENT/PLAN:  Patient has generalized abdominal pain. Will do workup for that  Take Prilosec 20 mg daily  Continue rest of the medications  Orders written. Orders Placed This Encounter   Procedures    CT ABDOMEN PELVIS WO CONTRAST Additional Contrast? Oral    CBC Auto Differential    Comprehensive Metabolic Panel    Lipase     Orders Placed This Encounter   Medications    omeprazole (PRILOSEC) 20 MG delayed release capsule     Sig: Take 1 capsule by mouth 2 times daily     Dispense:  30 capsule     Refill:  0         Mediations reviewed with the patient. Continue current medications. Appropriate prescriptions are addressed. After visit kaiy provided. Follow up as directed sooner if needed. Questions answered and patient verbalizes understanding. Call for any problems, questions, or concerns. Allergies   Allergen Reactions    Amiodarone      Severe hair loss    Amoxicillin Other (See Comments)     Pt states she got C-diff. , so she doesn't like atb     Current Outpatient Prescriptions   Medication Sig Dispense Refill    Magnesium 125 MG CAPS Take by mouth daily      levothyroxine (SYNTHROID) 50 MCG tablet TAKE 1 TABLET BY MOUTH DAILY 90 tablet 1    benazepril (LOTENSIN) 20 MG tablet TAKE 1 TABLET BY MOUTH DAILY 90 tablet 1    atorvastatin (LIPITOR) 10 MG tablet TAKE 1 TABLET EVERY DAY 90 tablet 1    MAGNESIUM PO Take by mouth 2 times daily      dicyclomine (BENTYL) 10 MG capsule Take 1 capsule by mouth 2 times daily as needed (abdominal cramps) 60 capsule 3    nitroGLYCERIN (NITROSTAT) 0.4 MG SL tablet Place 1 tablet under the tongue every 5 minutes as needed for Chest pain 25 tablet 3    lactobacillus (CULTURELLE) capsule Take 1 capsule by mouth 2 times daily (with meals) 30 capsule 0    aspirin 325 MG tablet Take 81 mg by mouth once       Coenzyme Q10 (CO Q 10) 100 MG CAPS Take by mouth daily       Cholecalciferol (VITAMIN D3) 2000 UNITS CAPS Take 1 capsule by mouth daily       Multiple Vitamins-Minerals (MULTIVITAMIN & MINERAL PO) Take 1 tablet by mouth daily      calcium carbonate (OSCAL) 500 MG TABS tablet Take 500 mg by mouth daily       No current facility-administered medications for this visit. Past Medical History:   Diagnosis Date    Acute deep vein thrombosis (DVT) of distal vein of left lower extremity ([address] 10/23/2016    DVT involving left peroneal, posterior and anterior tibial veins 10/16 Treated for at least 3 months and repeat venous doppler was negative and was discontinued     Axillary adenopathy 8/28/2014    US of axilla 6/14 showed benign lymph nodes    C. difficile diarrhea 11/18/2016    Treated few times and now is normal    CAD (coronary artery disease)     Chest discomfort 8/1/2017    Colitis 12/13    EGD,COLONOSCOPY, SBFT normal. Dr. Tatum Gustafson    Cyst of right ovary 9/6/2016    Seen Dr Doug Jackson and had several US per pt.  Dizzy spells 6/5/2017    Family history of coronary artery disease     H/O 24 hour EKG monitoring 7/27/09    NSR, few PVCs and occasional couplet noted (total 1007 PVCs), no pauses noted.  H/O colonoscopy 10/09, 12/13    f/u in 5 years    H/O echocardiogram 9/17/12    Normal LV size and function, mild mitral and tricuspid regurg., EF 60%.  H/O mitral valve prolapse 2003    MR mild    H/O myocardial perfusion scan 12/16/14    Stress Cardiolite. Normal perfusion in the distribution of all coronaries, normal LV size and function, EF 70%.     H/O screening mammography 8/11    Dr Tory Bender History of cardiac monitoring 06/05/2017 06/01/2018    CREATININE 1.0 06/01/2018     06/01/2018    K 4.4 06/01/2018    ALT 18 06/01/2018    AST 25 06/01/2018    GLUCOSE 95 04/28/2018     PT/INR:   Lab Results   Component Value Date    INR 0.91 10/17/2017     A1C:   Lab Results   Component Value Date    LABA1C 5.3 11/11/2013           Tia De La Fuente MD, 8/14/2018 , 12:29 PM

## 2018-08-16 ENCOUNTER — TELEPHONE (OUTPATIENT)
Dept: INTERNAL MEDICINE CLINIC | Age: 78
End: 2018-08-16

## 2018-08-20 DIAGNOSIS — R10.84 GENERALIZED ABDOMINAL PAIN: ICD-10-CM

## 2018-08-20 LAB
A/G RATIO: 1.5 (ref 1.1–2.2)
ALBUMIN SERPL-MCNC: 4.1 G/DL (ref 3.4–5)
ALP BLD-CCNC: 59 U/L (ref 40–129)
ALT SERPL-CCNC: 15 U/L (ref 10–40)
ANION GAP SERPL CALCULATED.3IONS-SCNC: 16 MMOL/L (ref 3–16)
AST SERPL-CCNC: 23 U/L (ref 15–37)
BASOPHILS ABSOLUTE: 0.1 K/UL (ref 0–0.2)
BASOPHILS RELATIVE PERCENT: 1.1 %
BILIRUB SERPL-MCNC: 0.4 MG/DL (ref 0–1)
BUN BLDV-MCNC: 14 MG/DL (ref 7–20)
CALCIUM SERPL-MCNC: 9.7 MG/DL (ref 8.3–10.6)
CHLORIDE BLD-SCNC: 102 MMOL/L (ref 99–110)
CO2: 24 MMOL/L (ref 21–32)
CREAT SERPL-MCNC: 1 MG/DL (ref 0.6–1.2)
EOSINOPHILS ABSOLUTE: 0.2 K/UL (ref 0–0.6)
EOSINOPHILS RELATIVE PERCENT: 3.2 %
GFR AFRICAN AMERICAN: >60
GFR NON-AFRICAN AMERICAN: 53
GLOBULIN: 2.7 G/DL
GLUCOSE BLD-MCNC: 85 MG/DL (ref 70–99)
HCT VFR BLD CALC: 41.9 % (ref 36–48)
HEMOGLOBIN: 13.9 G/DL (ref 12–16)
LIPASE: 28 U/L (ref 13–60)
LYMPHOCYTES ABSOLUTE: 1.9 K/UL (ref 1–5.1)
LYMPHOCYTES RELATIVE PERCENT: 37.7 %
MCH RBC QN AUTO: 30.3 PG (ref 26–34)
MCHC RBC AUTO-ENTMCNC: 33.2 G/DL (ref 31–36)
MCV RBC AUTO: 91.4 FL (ref 80–100)
MONOCYTES ABSOLUTE: 0.3 K/UL (ref 0–1.3)
MONOCYTES RELATIVE PERCENT: 6.6 %
NEUTROPHILS ABSOLUTE: 2.6 K/UL (ref 1.7–7.7)
NEUTROPHILS RELATIVE PERCENT: 51.4 %
PDW BLD-RTO: 14 % (ref 12.4–15.4)
PLATELET # BLD: 219 K/UL (ref 135–450)
PMV BLD AUTO: 9.5 FL (ref 5–10.5)
POTASSIUM SERPL-SCNC: 4.3 MMOL/L (ref 3.5–5.1)
RBC # BLD: 4.58 M/UL (ref 4–5.2)
SODIUM BLD-SCNC: 142 MMOL/L (ref 136–145)
TOTAL PROTEIN: 6.8 G/DL (ref 6.4–8.2)
WBC # BLD: 5 K/UL (ref 4–11)

## 2018-08-30 ENCOUNTER — OFFICE VISIT (OUTPATIENT)
Dept: INTERNAL MEDICINE CLINIC | Age: 78
End: 2018-08-30

## 2018-08-30 VITALS
OXYGEN SATURATION: 94 % | WEIGHT: 139 LBS | BODY MASS INDEX: 24.62 KG/M2 | SYSTOLIC BLOOD PRESSURE: 110 MMHG | HEART RATE: 82 BPM | DIASTOLIC BLOOD PRESSURE: 70 MMHG

## 2018-08-30 DIAGNOSIS — F41.1 ANXIETY NEUROSIS: ICD-10-CM

## 2018-08-30 DIAGNOSIS — I10 ESSENTIAL HYPERTENSION: ICD-10-CM

## 2018-08-30 DIAGNOSIS — I47.20 VENTRICULAR TACHYCARDIA: ICD-10-CM

## 2018-08-30 DIAGNOSIS — R10.84 GENERALIZED ABDOMINAL PAIN: Primary | ICD-10-CM

## 2018-08-30 DIAGNOSIS — K21.9 GASTROESOPHAGEAL REFLUX DISEASE WITHOUT ESOPHAGITIS: ICD-10-CM

## 2018-08-30 DIAGNOSIS — E03.9 ACQUIRED HYPOTHYROIDISM: ICD-10-CM

## 2018-08-30 DIAGNOSIS — K52.9 COLITIS: ICD-10-CM

## 2018-08-30 DIAGNOSIS — I49.3 PVCS (PREMATURE VENTRICULAR CONTRACTIONS): ICD-10-CM

## 2018-08-30 DIAGNOSIS — E78.2 MIXED HYPERLIPIDEMIA: ICD-10-CM

## 2018-08-30 PROCEDURE — 1101F PT FALLS ASSESS-DOCD LE1/YR: CPT | Performed by: INTERNAL MEDICINE

## 2018-08-30 PROCEDURE — 1036F TOBACCO NON-USER: CPT | Performed by: INTERNAL MEDICINE

## 2018-08-30 PROCEDURE — 1123F ACP DISCUSS/DSCN MKR DOCD: CPT | Performed by: INTERNAL MEDICINE

## 2018-08-30 PROCEDURE — G8427 DOCREV CUR MEDS BY ELIG CLIN: HCPCS | Performed by: INTERNAL MEDICINE

## 2018-08-30 PROCEDURE — G8399 PT W/DXA RESULTS DOCUMENT: HCPCS | Performed by: INTERNAL MEDICINE

## 2018-08-30 PROCEDURE — 1090F PRES/ABSN URINE INCON ASSESS: CPT | Performed by: INTERNAL MEDICINE

## 2018-08-30 PROCEDURE — 4040F PNEUMOC VAC/ADMIN/RCVD: CPT | Performed by: INTERNAL MEDICINE

## 2018-08-30 PROCEDURE — 99213 OFFICE O/P EST LOW 20 MIN: CPT | Performed by: INTERNAL MEDICINE

## 2018-08-30 PROCEDURE — G8420 CALC BMI NORM PARAMETERS: HCPCS | Performed by: INTERNAL MEDICINE

## 2018-08-30 RX ORDER — PANTOPRAZOLE SODIUM 40 MG/1
40 TABLET, DELAYED RELEASE ORAL DAILY
Qty: 30 TABLET | Refills: 3 | Status: SHIPPED | OUTPATIENT
Start: 2018-08-30 | End: 2019-01-29 | Stop reason: CLARIF

## 2018-08-30 RX ORDER — LACTOBACILLUS RHAMNOSUS GG 10B CELL
1 CAPSULE ORAL 2 TIMES DAILY WITH MEALS
Qty: 30 CAPSULE | Refills: 0 | Status: CANCELLED | OUTPATIENT
Start: 2018-08-30

## 2018-08-30 RX ORDER — NITROGLYCERIN 0.4 MG/1
0.4 TABLET SUBLINGUAL EVERY 5 MIN PRN
Qty: 25 TABLET | Refills: 3 | Status: SHIPPED | OUTPATIENT
Start: 2018-08-30 | End: 2020-05-19

## 2018-08-30 ASSESSMENT — ENCOUNTER SYMPTOMS
EYES NEGATIVE: 1
NAUSEA: 1
RESPIRATORY NEGATIVE: 1
BLOOD IN STOOL: 0
ABDOMINAL PAIN: 1

## 2018-08-30 NOTE — PROGRESS NOTES
H/O colonoscopy 10/09, 12/13    f/u in 5 years    H/O echocardiogram 9/17/12    Normal LV size and function, mild mitral and tricuspid regurg., EF 60%.  H/O mitral valve prolapse 2003    MR mild    H/O myocardial perfusion scan 12/16/14    Stress Cardiolite. Normal perfusion in the distribution of all coronaries, normal LV size and function, EF 70%.  H/O screening mammography 8/11    Dr Janie Collins History of cardiac monitoring 06/05/2017    Abnormal-SR with complex ventricular arrhythmias and frequent PVCs. No symptoms during marked bradycardia or during the fastest rate of 143 with nonsustained three-beat runs of ventricular tachycardia.  History of cardiac monitoring 08/23/2017    Abnormal event monitor findings suggestive of predominantly sinus bradycardia with isolated and frequent low-grade ventricular ectopy with symptoms reported.  Hx of Doppler ultrasound 10/17/2017    duplex venous doppler-minimal reflux noted in the left CFV    Hyperlipidemia     Hypertension     Hypothyroidism     Liver dysfunction     liver bx 10/09,mild steaotosis    Osteoarthritis of cervical spine 6/7/2016    Ovarian cyst     Papanicolaou smear 09/14/2016    Dr. Janie Collins PVCs (premature ventricular contractions)     symptomatic frequent PVCs, sees Dr. Shayy Daniel Ventricular tachycardia ([address] 8/1/2017    Exercise induced 8/1/17     Past Surgical History:   Procedure Laterality Date    APPENDECTOMY      BREAST BIOPSY      CATARACT REMOVAL Bilateral     COLONOSCOPY  12/3/13,2-28-17 2.28.17 no need for follow up due to pt's advanced age per Dr. Andria Ayers.  DIAGNOSTIC CARDIAC CATH LAB PROCEDURE  08/03/2017    Normal coronaries, EF 70%.     HYSTERECTOMY  1961    LIVER BIOPSY  10/09    steasosis    UPPER GASTROINTESTINAL ENDOSCOPY  11/02/2017    Dr. Andria Ayers     Social History   Substance Use Topics    Smoking status: Never Smoker    Smokeless tobacco: Never Used      Comment: Reviewed 10/2/17    Alcohol use 0.0 oz/week      Comment:  2 beers per day or 2 glasses of \"bubbly\"/day       LAB REVIEW:  CBC:   Lab Results   Component Value Date    WBC 5.0 08/20/2018    HGB 13.9 08/20/2018    HCT 41.9 08/20/2018     08/20/2018     Lipids:   Lab Results   Component Value Date    CHOL 137 02/11/2017    TRIG 62 02/11/2017    HDL 66 06/01/2018    LDLDIRECT 70 06/01/2018    TRIGLYCFAST 92 06/01/2018     Renal:   Lab Results   Component Value Date    BUN 14 08/20/2018    CREATININE 1.0 08/20/2018     08/20/2018    K 4.3 08/20/2018    ALT 15 08/20/2018    AST 23 08/20/2018    GLUCOSE 85 08/20/2018     PT/INR:   Lab Results   Component Value Date    INR 0.91 10/17/2017     A1C:   Lab Results   Component Value Date    LABA1C 5.3 11/11/2013           Tia De La Fuente MD, 8/30/2018 , 9:37 AM

## 2018-10-23 ENCOUNTER — OFFICE VISIT (OUTPATIENT)
Dept: INTERNAL MEDICINE CLINIC | Age: 78
End: 2018-10-23
Payer: MEDICARE

## 2018-10-23 VITALS
DIASTOLIC BLOOD PRESSURE: 72 MMHG | RESPIRATION RATE: 12 BRPM | HEART RATE: 54 BPM | OXYGEN SATURATION: 99 % | SYSTOLIC BLOOD PRESSURE: 134 MMHG | BODY MASS INDEX: 24.8 KG/M2 | TEMPERATURE: 98 F | WEIGHT: 140 LBS

## 2018-10-23 DIAGNOSIS — K76.89 LIVER DYSFUNCTION: ICD-10-CM

## 2018-10-23 DIAGNOSIS — K52.9 COLITIS: ICD-10-CM

## 2018-10-23 DIAGNOSIS — K21.9 GASTROESOPHAGEAL REFLUX DISEASE WITHOUT ESOPHAGITIS: ICD-10-CM

## 2018-10-23 DIAGNOSIS — E03.9 ACQUIRED HYPOTHYROIDISM: ICD-10-CM

## 2018-10-23 DIAGNOSIS — F41.1 ANXIETY NEUROSIS: ICD-10-CM

## 2018-10-23 DIAGNOSIS — I10 ESSENTIAL HYPERTENSION: Primary | ICD-10-CM

## 2018-10-23 DIAGNOSIS — Z23 NEED FOR INFLUENZA VACCINATION: ICD-10-CM

## 2018-10-23 DIAGNOSIS — I49.3 PVCS (PREMATURE VENTRICULAR CONTRACTIONS): ICD-10-CM

## 2018-10-23 DIAGNOSIS — E78.2 MIXED HYPERLIPIDEMIA: ICD-10-CM

## 2018-10-23 PROCEDURE — G8420 CALC BMI NORM PARAMETERS: HCPCS | Performed by: INTERNAL MEDICINE

## 2018-10-23 PROCEDURE — G8427 DOCREV CUR MEDS BY ELIG CLIN: HCPCS | Performed by: INTERNAL MEDICINE

## 2018-10-23 PROCEDURE — 4040F PNEUMOC VAC/ADMIN/RCVD: CPT | Performed by: INTERNAL MEDICINE

## 2018-10-23 PROCEDURE — 1090F PRES/ABSN URINE INCON ASSESS: CPT | Performed by: INTERNAL MEDICINE

## 2018-10-23 PROCEDURE — G8399 PT W/DXA RESULTS DOCUMENT: HCPCS | Performed by: INTERNAL MEDICINE

## 2018-10-23 PROCEDURE — 1036F TOBACCO NON-USER: CPT | Performed by: INTERNAL MEDICINE

## 2018-10-23 PROCEDURE — G8482 FLU IMMUNIZE ORDER/ADMIN: HCPCS | Performed by: INTERNAL MEDICINE

## 2018-10-23 PROCEDURE — 1101F PT FALLS ASSESS-DOCD LE1/YR: CPT | Performed by: INTERNAL MEDICINE

## 2018-10-23 PROCEDURE — 90662 IIV NO PRSV INCREASED AG IM: CPT | Performed by: INTERNAL MEDICINE

## 2018-10-23 PROCEDURE — 99213 OFFICE O/P EST LOW 20 MIN: CPT | Performed by: INTERNAL MEDICINE

## 2018-10-23 PROCEDURE — G0008 ADMIN INFLUENZA VIRUS VAC: HCPCS | Performed by: INTERNAL MEDICINE

## 2018-10-23 PROCEDURE — 1123F ACP DISCUSS/DSCN MKR DOCD: CPT | Performed by: INTERNAL MEDICINE

## 2018-10-23 NOTE — PROGRESS NOTES
Past Medical History:   Diagnosis Date    Acute deep vein thrombosis (DVT) of distal vein of left lower extremity ([address] 10/23/2016    DVT involving left peroneal, posterior and anterior tibial veins 10/16 Treated for at least 3 months and repeat venous doppler was negative and was discontinued     Axillary adenopathy 8/28/2014    US of axilla 6/14 showed benign lymph nodes    C. difficile diarrhea 11/18/2016    Treated few times and now is normal    CAD (coronary artery disease)     Chest discomfort 8/1/2017    Colitis 12/13    EGD,COLONOSCOPY, SBFT normal. Dr. Joey Waite    Cyst of right ovary 9/6/2016    Seen Dr Dc Chance and had several US per pt.  Dizzy spells 6/5/2017    Family history of coronary artery disease     H/O 24 hour EKG monitoring 7/27/09    NSR, few PVCs and occasional couplet noted (total 1007 PVCs), no pauses noted.  H/O colonoscopy 10/09, 12/13    f/u in 5 years    H/O echocardiogram 9/17/12    Normal LV size and function, mild mitral and tricuspid regurg., EF 60%.  H/O mitral valve prolapse 2003    MR mild    H/O myocardial perfusion scan 12/16/14    Stress Cardiolite. Normal perfusion in the distribution of all coronaries, normal LV size and function, EF 70%.  H/O screening mammography 8/11    Dr Joana Champagne History of cardiac monitoring 06/05/2017    Abnormal-SR with complex ventricular arrhythmias and frequent PVCs. No symptoms during marked bradycardia or during the fastest rate of 143 with nonsustained three-beat runs of ventricular tachycardia.  History of cardiac monitoring 08/23/2017    Abnormal event monitor findings suggestive of predominantly sinus bradycardia with isolated and frequent low-grade ventricular ectopy with symptoms reported.      Hx of Doppler ultrasound 10/17/2017    duplex venous doppler-minimal reflux noted in the left CFV    Hyperlipidemia     Hypertension     Hypothyroidism     Liver dysfunction     liver bx 10/09,mild steaotosis    Osteoarthritis of cervical spine 6/7/2016    Ovarian cyst     Papanicolaou smear 09/14/2016    Dr. Tee Ye PVCs (premature ventricular contractions)     symptomatic frequent PVCs, sees Dr. Glaser Massing Ventricular tachycardia ([facility] [address] 8/1/2017    Exercise induced 8/1/17     Past Surgical History:   Procedure Laterality Date    APPENDECTOMY      BREAST BIOPSY      CATARACT REMOVAL Bilateral     COLONOSCOPY  12/3/13,2-28-17 2.28.17 no need for follow up due to pt's advanced age per Dr. Mildred Johnston.  DIAGNOSTIC CARDIAC CATH LAB PROCEDURE  08/03/2017    Normal coronaries, EF 70%.    [address]    LIVER BIOPSY  10/09    steasosis    UPPER GASTROINTESTINAL ENDOSCOPY  11/02/2017    Dr. Mildred Johnston     Social History   Substance Use Topics    Smoking status: Never Smoker    Smokeless tobacco: Never Used      Comment: Reviewed 10/2/17    Alcohol use 0.0 oz/week      Comment:  2 beers per day or 2 glasses of \"bubbly\"/day       LAB REVIEW:  CBC:   Lab Results   Component Value Date    WBC 5.0 08/20/2018    HGB 13.9 08/20/2018    HCT 41.9 08/20/2018     08/20/2018     Lipids:   Lab Results   Component Value Date    CHOL 137 02/11/2017    TRIG 62 02/11/2017    HDL 66 06/01/2018    LDLDIRECT 70 06/01/2018    TRIGLYCFAST 92 06/01/2018     Renal:   Lab Results   Component Value Date    BUN 14 08/20/2018    CREATININE 1.0 08/20/2018     08/20/2018    K 4.3 08/20/2018    ALT 15 08/20/2018    AST 23 08/20/2018    GLUCOSE 85 08/20/2018     PT/INR:   Lab Results   Component Value Date    INR 0.91 10/17/2017     A1C:   Lab Results   Component Value Date    LABA1C 5.3 11/11/2013           Cheryle Dikes, MD, 10/23/2018 , 10:05 AM

## 2018-10-26 RX ORDER — BENAZEPRIL HYDROCHLORIDE 20 MG/1
TABLET ORAL
Qty: 90 TABLET | Refills: 1 | Status: SHIPPED | OUTPATIENT
Start: 2018-10-26 | End: 2019-01-29 | Stop reason: SDUPTHER

## 2018-10-26 RX ORDER — LEVOTHYROXINE SODIUM 0.05 MG/1
TABLET ORAL
Qty: 90 TABLET | Refills: 1 | Status: SHIPPED | OUTPATIENT
Start: 2018-10-26 | End: 2019-01-29 | Stop reason: SDUPTHER

## 2018-10-26 RX ORDER — ATORVASTATIN CALCIUM 10 MG/1
TABLET, FILM COATED ORAL
Qty: 90 TABLET | Refills: 1 | Status: SHIPPED | OUTPATIENT
Start: 2018-10-26 | End: 2019-01-29 | Stop reason: SDUPTHER

## 2018-11-06 RX ORDER — DICYCLOMINE HYDROCHLORIDE 10 MG/1
10 CAPSULE ORAL 2 TIMES DAILY PRN
Qty: 180 CAPSULE | Refills: 1 | Status: SHIPPED | OUTPATIENT
Start: 2018-11-06 | End: 2020-02-18 | Stop reason: SDUPTHER

## 2019-01-25 ENCOUNTER — HOSPITAL ENCOUNTER (OUTPATIENT)
Age: 79
Discharge: HOME | End: 2019-01-25
Payer: MEDICARE

## 2019-01-25 DIAGNOSIS — E78.2 MIXED HYPERLIPIDEMIA: ICD-10-CM

## 2019-01-25 DIAGNOSIS — I49.3 PVCS (PREMATURE VENTRICULAR CONTRACTIONS): ICD-10-CM

## 2019-01-25 DIAGNOSIS — I10 ESSENTIAL HYPERTENSION: ICD-10-CM

## 2019-01-25 LAB
ALBUMIN SERPL-MCNC: 4.9 GM/DL (ref 3.4–5)
ALP BLD-CCNC: 57 IU/L (ref 40–129)
ALT SERPL-CCNC: 22 U/L (ref 10–40)
ANION GAP SERPL CALCULATED.3IONS-SCNC: 13 MMOL/L (ref 4–16)
AST SERPL-CCNC: 31 IU/L (ref 15–37)
BILIRUB SERPL-MCNC: 0.7 MG/DL (ref 0–1)
BUN BLDV-MCNC: 13 MG/DL (ref 6–23)
CALCIUM SERPL-MCNC: 10.3 MG/DL (ref 8.3–10.6)
CHLORIDE BLD-SCNC: 100 MMOL/L (ref 99–110)
CHOLESTEROL, FASTING: 151 MG/DL
CO2: 28 MMOL/L (ref 21–32)
CREAT SERPL-MCNC: 1.1 MG/DL (ref 0.6–1.1)
GFR AFRICAN AMERICAN: 58 ML/MIN/1.73M2
GFR NON-AFRICAN AMERICAN: 48 ML/MIN/1.73M2
GLUCOSE BLD-MCNC: 99 MG/DL (ref 70–99)
HDLC SERPL-MCNC: 70 MG/DL
LDL CHOLESTEROL DIRECT: 80 MG/DL
MAGNESIUM: 2.3 MG/DL (ref 1.8–2.4)
POTASSIUM SERPL-SCNC: 4.3 MMOL/L (ref 3.5–5.1)
SODIUM BLD-SCNC: 141 MMOL/L (ref 135–145)
TOTAL PROTEIN: 7.5 GM/DL (ref 6.4–8.2)
TRIGLYCERIDE, FASTING: 95 MG/DL

## 2019-01-25 PROCEDURE — 83735 ASSAY OF MAGNESIUM: CPT

## 2019-01-25 PROCEDURE — 80061 LIPID PANEL: CPT

## 2019-01-25 PROCEDURE — 80053 COMPREHEN METABOLIC PANEL: CPT

## 2019-01-25 PROCEDURE — 36415 COLL VENOUS BLD VENIPUNCTURE: CPT

## 2019-01-29 ENCOUNTER — OFFICE VISIT (OUTPATIENT)
Dept: INTERNAL MEDICINE CLINIC | Age: 79
End: 2019-01-29
Payer: MEDICARE

## 2019-01-29 VITALS
DIASTOLIC BLOOD PRESSURE: 70 MMHG | WEIGHT: 141 LBS | SYSTOLIC BLOOD PRESSURE: 126 MMHG | HEART RATE: 50 BPM | BODY MASS INDEX: 24.98 KG/M2 | OXYGEN SATURATION: 99 %

## 2019-01-29 DIAGNOSIS — E78.2 MIXED HYPERLIPIDEMIA: ICD-10-CM

## 2019-01-29 DIAGNOSIS — I49.3 PVCS (PREMATURE VENTRICULAR CONTRACTIONS): ICD-10-CM

## 2019-01-29 DIAGNOSIS — R00.1 BRADYCARDIA: Primary | ICD-10-CM

## 2019-01-29 DIAGNOSIS — F41.1 ANXIETY NEUROSIS: ICD-10-CM

## 2019-01-29 DIAGNOSIS — E03.9 ACQUIRED HYPOTHYROIDISM: ICD-10-CM

## 2019-01-29 DIAGNOSIS — I10 ESSENTIAL HYPERTENSION: ICD-10-CM

## 2019-01-29 DIAGNOSIS — I47.20 VENTRICULAR TACHYCARDIA: ICD-10-CM

## 2019-01-29 DIAGNOSIS — K21.9 GASTROESOPHAGEAL REFLUX DISEASE WITHOUT ESOPHAGITIS: ICD-10-CM

## 2019-01-29 LAB — TSH SERPL DL<=0.05 MIU/L-ACNC: 3.44 UIU/ML (ref 0.27–4.2)

## 2019-01-29 PROCEDURE — G8427 DOCREV CUR MEDS BY ELIG CLIN: HCPCS | Performed by: INTERNAL MEDICINE

## 2019-01-29 PROCEDURE — G8482 FLU IMMUNIZE ORDER/ADMIN: HCPCS | Performed by: INTERNAL MEDICINE

## 2019-01-29 PROCEDURE — 4040F PNEUMOC VAC/ADMIN/RCVD: CPT | Performed by: INTERNAL MEDICINE

## 2019-01-29 PROCEDURE — 36415 COLL VENOUS BLD VENIPUNCTURE: CPT | Performed by: INTERNAL MEDICINE

## 2019-01-29 PROCEDURE — 93000 ELECTROCARDIOGRAM COMPLETE: CPT | Performed by: INTERNAL MEDICINE

## 2019-01-29 PROCEDURE — 99214 OFFICE O/P EST MOD 30 MIN: CPT | Performed by: INTERNAL MEDICINE

## 2019-01-29 PROCEDURE — G8399 PT W/DXA RESULTS DOCUMENT: HCPCS | Performed by: INTERNAL MEDICINE

## 2019-01-29 PROCEDURE — 1123F ACP DISCUSS/DSCN MKR DOCD: CPT | Performed by: INTERNAL MEDICINE

## 2019-01-29 PROCEDURE — 1101F PT FALLS ASSESS-DOCD LE1/YR: CPT | Performed by: INTERNAL MEDICINE

## 2019-01-29 PROCEDURE — G8420 CALC BMI NORM PARAMETERS: HCPCS | Performed by: INTERNAL MEDICINE

## 2019-01-29 PROCEDURE — 1036F TOBACCO NON-USER: CPT | Performed by: INTERNAL MEDICINE

## 2019-01-29 PROCEDURE — 1090F PRES/ABSN URINE INCON ASSESS: CPT | Performed by: INTERNAL MEDICINE

## 2019-01-29 RX ORDER — ATORVASTATIN CALCIUM 10 MG/1
TABLET, FILM COATED ORAL
Qty: 90 TABLET | Refills: 1 | Status: CANCELLED | OUTPATIENT
Start: 2019-01-29

## 2019-01-29 RX ORDER — LEVOTHYROXINE SODIUM 0.05 MG/1
TABLET ORAL
Qty: 90 TABLET | Refills: 1 | Status: CANCELLED | OUTPATIENT
Start: 2019-01-29

## 2019-01-29 RX ORDER — LEVOTHYROXINE SODIUM 0.05 MG/1
TABLET ORAL
Qty: 90 TABLET | Refills: 1 | Status: SHIPPED | OUTPATIENT
Start: 2019-01-29 | End: 2019-08-28 | Stop reason: SDUPTHER

## 2019-01-29 RX ORDER — BENAZEPRIL HYDROCHLORIDE 20 MG/1
TABLET ORAL
Qty: 90 TABLET | Refills: 1 | Status: SHIPPED | OUTPATIENT
Start: 2019-01-29 | End: 2019-05-07 | Stop reason: SDUPTHER

## 2019-01-29 RX ORDER — BENAZEPRIL HYDROCHLORIDE 20 MG/1
TABLET ORAL
Qty: 90 TABLET | Refills: 1 | Status: CANCELLED | OUTPATIENT
Start: 2019-01-29

## 2019-01-29 RX ORDER — ATORVASTATIN CALCIUM 10 MG/1
TABLET, FILM COATED ORAL
Qty: 90 TABLET | Refills: 1 | Status: SHIPPED | OUTPATIENT
Start: 2019-01-29 | End: 2019-05-07 | Stop reason: SDUPTHER

## 2019-01-29 ASSESSMENT — ENCOUNTER SYMPTOMS
RESPIRATORY NEGATIVE: 1
EYES NEGATIVE: 1
GASTROINTESTINAL NEGATIVE: 1

## 2019-02-06 ENCOUNTER — HOSPITAL ENCOUNTER (OUTPATIENT)
Dept: CARDIAC REHAB | Age: 79
Discharge: HOME | End: 2019-02-06
Payer: MEDICARE

## 2019-02-06 DIAGNOSIS — R00.1 BRADYCARDIA: ICD-10-CM

## 2019-02-06 PROCEDURE — 93226 XTRNL ECG REC<48 HR SCAN A/R: CPT

## 2019-02-06 PROCEDURE — 93225 XTRNL ECG REC<48 HRS REC: CPT

## 2019-03-07 ENCOUNTER — OFFICE VISIT (OUTPATIENT)
Dept: INTERNAL MEDICINE CLINIC | Age: 79
End: 2019-03-07
Payer: MEDICARE

## 2019-03-07 VITALS
TEMPERATURE: 97.6 F | RESPIRATION RATE: 16 BRPM | HEART RATE: 56 BPM | WEIGHT: 139.6 LBS | DIASTOLIC BLOOD PRESSURE: 68 MMHG | BODY MASS INDEX: 24.73 KG/M2 | SYSTOLIC BLOOD PRESSURE: 138 MMHG | OXYGEN SATURATION: 96 %

## 2019-03-07 DIAGNOSIS — E03.9 ACQUIRED HYPOTHYROIDISM: ICD-10-CM

## 2019-03-07 DIAGNOSIS — K76.89 LIVER DYSFUNCTION: ICD-10-CM

## 2019-03-07 DIAGNOSIS — I10 ESSENTIAL HYPERTENSION: ICD-10-CM

## 2019-03-07 DIAGNOSIS — E78.2 MIXED HYPERLIPIDEMIA: ICD-10-CM

## 2019-03-07 DIAGNOSIS — K21.9 GASTROESOPHAGEAL REFLUX DISEASE WITHOUT ESOPHAGITIS: ICD-10-CM

## 2019-03-07 DIAGNOSIS — I49.3 PVCS (PREMATURE VENTRICULAR CONTRACTIONS): ICD-10-CM

## 2019-03-07 DIAGNOSIS — K52.9 COLITIS: ICD-10-CM

## 2019-03-07 PROCEDURE — G8482 FLU IMMUNIZE ORDER/ADMIN: HCPCS | Performed by: INTERNAL MEDICINE

## 2019-03-07 PROCEDURE — 1036F TOBACCO NON-USER: CPT | Performed by: INTERNAL MEDICINE

## 2019-03-07 PROCEDURE — 1101F PT FALLS ASSESS-DOCD LE1/YR: CPT | Performed by: INTERNAL MEDICINE

## 2019-03-07 PROCEDURE — G8420 CALC BMI NORM PARAMETERS: HCPCS | Performed by: INTERNAL MEDICINE

## 2019-03-07 PROCEDURE — G8399 PT W/DXA RESULTS DOCUMENT: HCPCS | Performed by: INTERNAL MEDICINE

## 2019-03-07 PROCEDURE — G8427 DOCREV CUR MEDS BY ELIG CLIN: HCPCS | Performed by: INTERNAL MEDICINE

## 2019-03-07 PROCEDURE — 1123F ACP DISCUSS/DSCN MKR DOCD: CPT | Performed by: INTERNAL MEDICINE

## 2019-03-07 PROCEDURE — 1090F PRES/ABSN URINE INCON ASSESS: CPT | Performed by: INTERNAL MEDICINE

## 2019-03-07 PROCEDURE — 4040F PNEUMOC VAC/ADMIN/RCVD: CPT | Performed by: INTERNAL MEDICINE

## 2019-03-07 PROCEDURE — 99213 OFFICE O/P EST LOW 20 MIN: CPT | Performed by: INTERNAL MEDICINE

## 2019-03-07 ASSESSMENT — PATIENT HEALTH QUESTIONNAIRE - PHQ9
1. LITTLE INTEREST OR PLEASURE IN DOING THINGS: 0
SUM OF ALL RESPONSES TO PHQ QUESTIONS 1-9: 0
SUM OF ALL RESPONSES TO PHQ QUESTIONS 1-9: 0
2. FEELING DOWN, DEPRESSED OR HOPELESS: 0
SUM OF ALL RESPONSES TO PHQ9 QUESTIONS 1 & 2: 0

## 2019-05-07 RX ORDER — LEVOTHYROXINE SODIUM 0.05 MG/1
TABLET ORAL
Qty: 90 TABLET | Refills: 1 | Status: SHIPPED | OUTPATIENT
Start: 2019-05-07 | End: 2019-05-09 | Stop reason: SDUPTHER

## 2019-05-07 RX ORDER — ATORVASTATIN CALCIUM 10 MG/1
TABLET, FILM COATED ORAL
Qty: 90 TABLET | Refills: 1 | Status: SHIPPED | OUTPATIENT
Start: 2019-05-07 | End: 2019-08-28 | Stop reason: SDUPTHER

## 2019-05-07 RX ORDER — BENAZEPRIL HYDROCHLORIDE 20 MG/1
TABLET ORAL
Qty: 90 TABLET | Refills: 1 | Status: SHIPPED | OUTPATIENT
Start: 2019-05-07 | End: 2019-08-28 | Stop reason: SDUPTHER

## 2019-05-09 ENCOUNTER — OFFICE VISIT (OUTPATIENT)
Dept: INTERNAL MEDICINE CLINIC | Age: 79
End: 2019-05-09
Payer: MEDICARE

## 2019-05-09 VITALS
DIASTOLIC BLOOD PRESSURE: 80 MMHG | SYSTOLIC BLOOD PRESSURE: 128 MMHG | RESPIRATION RATE: 16 BRPM | BODY MASS INDEX: 24.1 KG/M2 | WEIGHT: 136 LBS | HEART RATE: 58 BPM | HEIGHT: 63 IN | TEMPERATURE: 97.9 F | OXYGEN SATURATION: 98 %

## 2019-05-09 DIAGNOSIS — I10 ESSENTIAL HYPERTENSION: Primary | ICD-10-CM

## 2019-05-09 DIAGNOSIS — I47.20 VENTRICULAR TACHYCARDIA: ICD-10-CM

## 2019-05-09 DIAGNOSIS — F41.1 ANXIETY NEUROSIS: ICD-10-CM

## 2019-05-09 DIAGNOSIS — I49.3 PVCS (PREMATURE VENTRICULAR CONTRACTIONS): ICD-10-CM

## 2019-05-09 DIAGNOSIS — K52.9 COLITIS: ICD-10-CM

## 2019-05-09 DIAGNOSIS — K76.89 LIVER DYSFUNCTION: ICD-10-CM

## 2019-05-09 DIAGNOSIS — K21.9 GASTROESOPHAGEAL REFLUX DISEASE WITHOUT ESOPHAGITIS: ICD-10-CM

## 2019-05-09 DIAGNOSIS — E78.2 MIXED HYPERLIPIDEMIA: ICD-10-CM

## 2019-05-09 DIAGNOSIS — E03.9 ACQUIRED HYPOTHYROIDISM: ICD-10-CM

## 2019-05-09 PROCEDURE — 99214 OFFICE O/P EST MOD 30 MIN: CPT | Performed by: INTERNAL MEDICINE

## 2019-05-09 PROCEDURE — 1090F PRES/ABSN URINE INCON ASSESS: CPT | Performed by: INTERNAL MEDICINE

## 2019-05-09 PROCEDURE — G8420 CALC BMI NORM PARAMETERS: HCPCS | Performed by: INTERNAL MEDICINE

## 2019-05-09 PROCEDURE — G8427 DOCREV CUR MEDS BY ELIG CLIN: HCPCS | Performed by: INTERNAL MEDICINE

## 2019-05-09 PROCEDURE — 4040F PNEUMOC VAC/ADMIN/RCVD: CPT | Performed by: INTERNAL MEDICINE

## 2019-05-09 PROCEDURE — 1036F TOBACCO NON-USER: CPT | Performed by: INTERNAL MEDICINE

## 2019-05-09 PROCEDURE — G8399 PT W/DXA RESULTS DOCUMENT: HCPCS | Performed by: INTERNAL MEDICINE

## 2019-05-09 PROCEDURE — 1123F ACP DISCUSS/DSCN MKR DOCD: CPT | Performed by: INTERNAL MEDICINE

## 2019-05-09 RX ORDER — DOCUSATE SODIUM 100 MG/1
100 CAPSULE, LIQUID FILLED ORAL DAILY PRN
Qty: 30 CAPSULE | Refills: 2 | Status: SHIPPED | OUTPATIENT
Start: 2019-05-09 | End: 2019-12-04

## 2019-05-09 NOTE — PROGRESS NOTES
hair loss    Amoxicillin Other (See Comments)     Pt states she got C-diff. , so she doesn't like atb     Current Outpatient Medications   Medication Sig Dispense Refill    atorvastatin (LIPITOR) 10 MG tablet TAKE 1 TABLET EVERY DAY 90 tablet 1    benazepril (LOTENSIN) 20 MG tablet TAKE 1 TABLET BY MOUTH DAILY 90 tablet 1    levothyroxine (SYNTHROID) 50 MCG tablet TAKE 1 TABLET BY MOUTH DAILY 90 tablet 1    dicyclomine (BENTYL) 10 MG capsule Take 1 capsule by mouth 2 times daily as needed (abdominal cramps) 180 capsule 1    nitroGLYCERIN (NITROSTAT) 0.4 MG SL tablet Place 1 tablet under the tongue every 5 minutes as needed for Chest pain 25 tablet 3    Magnesium 125 MG CAPS Take by mouth daily      lactobacillus (CULTURELLE) capsule Take 1 capsule by mouth 2 times daily (with meals) 30 capsule 0    aspirin 325 MG tablet Take 81 mg by mouth once       Coenzyme Q10 (CO Q 10) 100 MG CAPS Take by mouth daily       Cholecalciferol (VITAMIN D3) 2000 UNITS CAPS Take 1 capsule by mouth daily       Multiple Vitamins-Minerals (MULTIVITAMIN & MINERAL PO) Take 1 tablet by mouth daily      calcium carbonate (OSCAL) 500 MG TABS tablet Take 500 mg by mouth daily       No current facility-administered medications for this visit. Past Medical History:   Diagnosis Date    Acute deep vein thrombosis (DVT) of distal vein of left lower extremity ([address] 10/23/2016    DVT involving left peroneal, posterior and anterior tibial veins 10/16 Treated for at least 3 months and repeat venous doppler was negative and was discontinued     Axillary adenopathy 8/28/2014    US of axilla 6/14 showed benign lymph nodes    C. difficile diarrhea 11/18/2016    Treated few times and now is normal    CAD (coronary artery disease)     Chest discomfort 8/1/2017    Colitis 12/13    EGD,COLONOSCOPY, SBFT normal. Dr. Crystal Brink    Cyst of right ovary 9/6/2016    Seen Dr Kate Orantes and had several US per pt.     Dizzy spells 6/5/2017   [facility] history of coronary artery disease     H/O 24 hour EKG monitoring 7/27/09    NSR, few PVCs and occasional couplet noted (total 1007 PVCs), no pauses noted.  H/O colonoscopy 10/09, 12/13    f/u in 5 years    H/O echocardiogram 9/17/12    Normal LV size and function, mild mitral and tricuspid regurg., EF 60%.  H/O mitral valve prolapse 2003    MR mild    H/O myocardial perfusion scan 12/16/14    Stress Cardiolite. Normal perfusion in the distribution of all coronaries, normal LV size and function, EF 70%.  H/O screening mammography 8/11    Dr Fco Wahl History of cardiac monitoring 06/05/2017    Abnormal-SR with complex ventricular arrhythmias and frequent PVCs. No symptoms during marked bradycardia or during the fastest rate of 143 with nonsustained three-beat runs of ventricular tachycardia.  History of cardiac monitoring 08/23/2017    Abnormal event monitor findings suggestive of predominantly sinus bradycardia with isolated and frequent low-grade ventricular ectopy with symptoms reported.  Hx of Doppler ultrasound 10/17/2017    duplex venous doppler-minimal reflux noted in the left CFV    Hyperlipidemia     Hypertension     Hypothyroidism     Liver dysfunction     liver bx 10/09,mild steaotosis    Osteoarthritis of cervical spine 6/7/2016    Ovarian cyst     Papanicolaou smear 09/14/2016    Dr. Fco Wahl PVCs (premature ventricular contractions)     symptomatic frequent PVCs, sees Dr. Marcus De Leon Ventricular tachycardia ([address] 8/1/2017    Exercise induced 8/1/17     Past Surgical History:   Procedure Laterality Date    APPENDECTOMY      BREAST BIOPSY      CATARACT REMOVAL Bilateral     COLONOSCOPY  12/3/13,2-28-17    2.28.17 no need for follow up due to pt's advanced age per Dr. Cuauhtemoc Gonzalez.  DIAGNOSTIC CARDIAC CATH LAB PROCEDURE  08/03/2017    Normal coronaries, EF 70%.    [address]    LIVER BIOPSY  10/09    steasosis    UPPER GASTROINTESTINAL ENDOSCOPY  11/02/2017    Dr. Cuauhtemoc Gonzalez Social History     Tobacco Use    Smoking status: Never Smoker    Smokeless tobacco: Never Used    Tobacco comment: Reviewed 10/2/17   Substance Use Topics    Alcohol use:  Yes     Alcohol/week: 0.0 oz     Comment:  2 beers per day or 2 glasses of \"bubbly\"/day       LAB REVIEW:  CBC:   Lab Results   Component Value Date    WBC 5.0 08/20/2018    HGB 13.9 08/20/2018    HCT 41.9 08/20/2018     08/20/2018     Lipids:   Lab Results   Component Value Date    CHOL 137 02/11/2017    TRIG 62 02/11/2017    HDL 70 01/25/2019    LDLDIRECT 80 01/25/2019    TRIGLYCFAST 95 01/25/2019     Renal:   Lab Results   Component Value Date    BUN 13 01/25/2019    CREATININE 1.1 01/25/2019     01/25/2019    K 4.3 01/25/2019    ALT 22 01/25/2019    AST 31 01/25/2019    GLUCOSE 99 01/25/2019     PT/INR:   Lab Results   Component Value Date    INR 0.91 10/17/2017     A1C:   Lab Results   Component Value Date    LABA1C 5.3 11/11/2013           Katarina Adkins MD, 5/9/2019 , 12:57 PM

## 2019-05-16 ASSESSMENT — ENCOUNTER SYMPTOMS
GASTROINTESTINAL NEGATIVE: 1
EYES NEGATIVE: 1
SHORTNESS OF BREATH: 0
COUGH: 0

## 2019-06-14 ENCOUNTER — HOSPITAL ENCOUNTER (OUTPATIENT)
Age: 79
Discharge: HOME | End: 2019-06-14
Payer: MEDICARE

## 2019-06-14 ENCOUNTER — OFFICE VISIT (OUTPATIENT)
Dept: INTERNAL MEDICINE CLINIC | Age: 79
End: 2019-06-14
Payer: MEDICARE

## 2019-06-14 ENCOUNTER — HOSPITAL ENCOUNTER (OUTPATIENT)
Dept: GENERAL RADIOLOGY | Age: 79
Discharge: HOME | End: 2019-06-14
Payer: MEDICARE

## 2019-06-14 VITALS
HEART RATE: 62 BPM | SYSTOLIC BLOOD PRESSURE: 138 MMHG | DIASTOLIC BLOOD PRESSURE: 64 MMHG | BODY MASS INDEX: 24.23 KG/M2 | OXYGEN SATURATION: 97 % | TEMPERATURE: 98.2 F | WEIGHT: 136.8 LBS | RESPIRATION RATE: 16 BRPM

## 2019-06-14 DIAGNOSIS — J04.0 ACUTE LARYNGITIS: ICD-10-CM

## 2019-06-14 DIAGNOSIS — J04.0 ACUTE LARYNGITIS: Primary | ICD-10-CM

## 2019-06-14 DIAGNOSIS — J20.9 ACUTE BRONCHITIS, UNSPECIFIED ORGANISM: ICD-10-CM

## 2019-06-14 PROCEDURE — G8427 DOCREV CUR MEDS BY ELIG CLIN: HCPCS | Performed by: INTERNAL MEDICINE

## 2019-06-14 PROCEDURE — G8399 PT W/DXA RESULTS DOCUMENT: HCPCS | Performed by: INTERNAL MEDICINE

## 2019-06-14 PROCEDURE — 99213 OFFICE O/P EST LOW 20 MIN: CPT | Performed by: INTERNAL MEDICINE

## 2019-06-14 PROCEDURE — 1036F TOBACCO NON-USER: CPT | Performed by: INTERNAL MEDICINE

## 2019-06-14 PROCEDURE — 4040F PNEUMOC VAC/ADMIN/RCVD: CPT | Performed by: INTERNAL MEDICINE

## 2019-06-14 PROCEDURE — 1090F PRES/ABSN URINE INCON ASSESS: CPT | Performed by: INTERNAL MEDICINE

## 2019-06-14 PROCEDURE — 1123F ACP DISCUSS/DSCN MKR DOCD: CPT | Performed by: INTERNAL MEDICINE

## 2019-06-14 PROCEDURE — G8420 CALC BMI NORM PARAMETERS: HCPCS | Performed by: INTERNAL MEDICINE

## 2019-06-14 PROCEDURE — 71046 X-RAY EXAM CHEST 2 VIEWS: CPT

## 2019-06-14 RX ORDER — DEXTROMETHORPHAN HYDROBROMIDE AND PROMETHAZINE HYDROCHLORIDE 15; 6.25 MG/5ML; MG/5ML
5 SYRUP ORAL 4 TIMES DAILY PRN
Qty: 180 ML | Refills: 0 | Status: SHIPPED | OUTPATIENT
Start: 2019-06-14 | End: 2019-06-21

## 2019-06-14 NOTE — PROGRESS NOTES
Gloria Salazar  Patient's  is 1940  Seen in office on 2019      SUBJECTIVE:  Delayne Angles keena [de-identified] y. o.year old female presents today   Chief Complaint   Patient presents with    Pharyngitis     started Tuesday Lost voice yesterday    Cough     non productive,back is sore from coughing     Pt is here with c/o sore throat . No dysphagia  No fever or chills  Has cough is dry. Pain in the back on left side and left side of chest  when she coughs  Had lost voice yesterday but it is little better today. Taking medications regularly. No side effects noted. Review of Systems    OBJECTIVE: /64   Pulse 62   Temp 98.2 °F (36.8 °C) (Oral)   Resp 16   Wt 136 lb 12.8 oz (62.1 kg)   SpO2 97%   BMI 24.23 kg/m²     Wt Readings from Last 3 Encounters:   19 136 lb 12.8 oz (62.1 kg)   19 136 lb (61.7 kg)   19 139 lb 9.6 oz (63.3 kg)      GENERAL: - Alert, oriented, pleasant, in no apparent distress. HEENT: - Conjunctiva pink, no scleral icterus. ENT clear. Throat is clear. Ear canals are normal  NECK: -Supple. No jugular venous distention noted. No masses felt,  CARDIOVASCULAR: - Normal S1 and S2    PULMONARY: - No respiratory distress. No wheezes or rales. No pleural rub. No tenderness of the back or chest wall. ABDOMEN: - Soft and non-tender,no masses  ororganomegaly. EXTREMITIES: - No cyanosis, clubbing, or significant edema. SKIN: Skin is warm and dry. NEUROLOGICAL: - Cranial nerves II through XII are grossly intact. IMPRESSION:    Encounter Diagnoses   Name Primary?  Acute laryngitis Yes    Acute bronchitis, unspecified organism        ASSESSMENT/PLAN:    1. Acute laryngitis / bronchitis  : will treat with  phenergan dm. Pt refused antibiotics  2. Will get chest x ray :   3. If symptoms get worse or SOB, high fever or chills call or go to ER  4. Grief patient still has problems coping with  Grief.   Patient's sons came in to spend the time on the weekend with her.    Mediations reviewed with the patient. Continue current medications. Appropriate prescriptions are addressed. After visit summeryprovided. Follow up as directed sooner if needed. Questions answered and patient verbalizes understanding. Call for any problems, questions, or concerns. Allergies   Allergen Reactions    Amiodarone      Severe hair loss    Amoxicillin Other (See Comments)     Pt states she got C-diff. , so she doesn't like atb     Current Outpatient Medications   Medication Sig Dispense Refill    docusate sodium (COLACE) 100 MG capsule Take 1 capsule by mouth daily as needed for Constipation 30 capsule 2    atorvastatin (LIPITOR) 10 MG tablet TAKE 1 TABLET EVERY DAY 90 tablet 1    benazepril (LOTENSIN) 20 MG tablet TAKE 1 TABLET BY MOUTH DAILY 90 tablet 1    levothyroxine (SYNTHROID) 50 MCG tablet TAKE 1 TABLET BY MOUTH DAILY 90 tablet 1    dicyclomine (BENTYL) 10 MG capsule Take 1 capsule by mouth 2 times daily as needed (abdominal cramps) 180 capsule 1    nitroGLYCERIN (NITROSTAT) 0.4 MG SL tablet Place 1 tablet under the tongue every 5 minutes as needed for Chest pain 25 tablet 3    Magnesium 125 MG CAPS Take by mouth daily      lactobacillus (CULTURELLE) capsule Take 1 capsule by mouth 2 times daily (with meals) 30 capsule 0    aspirin 325 MG tablet Take 81 mg by mouth once       Coenzyme Q10 (CO Q 10) 100 MG CAPS Take by mouth daily       Cholecalciferol (VITAMIN D3) 2000 UNITS CAPS Take 1 capsule by mouth daily       Multiple Vitamins-Minerals (MULTIVITAMIN & MINERAL PO) Take 1 tablet by mouth daily      calcium carbonate (OSCAL) 500 MG TABS tablet Take 500 mg by mouth daily       No current facility-administered medications for this visit.       Past Medical History:   Diagnosis Date    Acute deep vein thrombosis (DVT) of distal vein of left lower extremity ([facility] [address] 10/23/2016    DVT involving left peroneal, posterior and anterior tibial veins 10/16 Treated for at least 3 8/1/2017    Exercise induced 8/1/17     Past Surgical History:   Procedure Laterality Date    APPENDECTOMY      BREAST BIOPSY      CATARACT REMOVAL Bilateral     COLONOSCOPY  12/3/13,2-28-17 2.28.17 no need for follow up due to pt's advanced age per Dr. Renetta Crowell.  DIAGNOSTIC CARDIAC CATH LAB PROCEDURE  08/03/2017    Normal coronaries, EF 70%.  HYSTERECTOMY  1961    LIVER BIOPSY  10/09    steasosis    UPPER GASTROINTESTINAL ENDOSCOPY  11/02/2017    Dr. Renetta Crowell     Social History     Tobacco Use    Smoking status: Never Smoker    Smokeless tobacco: Never Used    Tobacco comment: Reviewed 10/2/17   Substance Use Topics    Alcohol use:  Yes     Alcohol/week: 0.0 oz     Comment:  2 beers per day or 2 glasses of \"bubbly\"/day       LAB REVIEW:  CBC:   Lab Results   Component Value Date    WBC 5.0 08/20/2018    HGB 13.9 08/20/2018    HCT 41.9 08/20/2018     08/20/2018     Lipids:   Lab Results   Component Value Date    CHOL 137 02/11/2017    TRIG 62 02/11/2017    HDL 70 01/25/2019    LDLDIRECT 80 01/25/2019    TRIGLYCFAST 95 01/25/2019     Renal:   Lab Results   Component Value Date    BUN 13 01/25/2019    CREATININE 1.1 01/25/2019     01/25/2019    K 4.3 01/25/2019    ALT 22 01/25/2019    AST 31 01/25/2019    GLUCOSE 99 01/25/2019     PT/INR:   Lab Results   Component Value Date    INR 0.91 10/17/2017     A1C:   Lab Results   Component Value Date    LABA1C 5.3 11/11/2013           Sy Valentin MD, 6/14/2019 , 11:03 AM

## 2019-08-16 ENCOUNTER — OFFICE VISIT (OUTPATIENT)
Dept: INTERNAL MEDICINE CLINIC | Age: 79
End: 2019-08-16
Payer: MEDICARE

## 2019-08-16 VITALS
RESPIRATION RATE: 16 BRPM | SYSTOLIC BLOOD PRESSURE: 106 MMHG | OXYGEN SATURATION: 99 % | BODY MASS INDEX: 24.02 KG/M2 | HEART RATE: 60 BPM | TEMPERATURE: 97.7 F | WEIGHT: 135.6 LBS | DIASTOLIC BLOOD PRESSURE: 60 MMHG

## 2019-08-16 DIAGNOSIS — R10.32 LEFT LOWER QUADRANT PAIN: ICD-10-CM

## 2019-08-16 DIAGNOSIS — E78.2 MIXED HYPERLIPIDEMIA: ICD-10-CM

## 2019-08-16 DIAGNOSIS — E03.9 ACQUIRED HYPOTHYROIDISM: ICD-10-CM

## 2019-08-16 DIAGNOSIS — I49.3 PVCS (PREMATURE VENTRICULAR CONTRACTIONS): ICD-10-CM

## 2019-08-16 DIAGNOSIS — I10 ESSENTIAL HYPERTENSION: ICD-10-CM

## 2019-08-16 PROCEDURE — 99213 OFFICE O/P EST LOW 20 MIN: CPT | Performed by: INTERNAL MEDICINE

## 2019-08-16 PROCEDURE — 1090F PRES/ABSN URINE INCON ASSESS: CPT | Performed by: INTERNAL MEDICINE

## 2019-08-16 PROCEDURE — G8420 CALC BMI NORM PARAMETERS: HCPCS | Performed by: INTERNAL MEDICINE

## 2019-08-16 PROCEDURE — 1036F TOBACCO NON-USER: CPT | Performed by: INTERNAL MEDICINE

## 2019-08-16 PROCEDURE — G8399 PT W/DXA RESULTS DOCUMENT: HCPCS | Performed by: INTERNAL MEDICINE

## 2019-08-16 PROCEDURE — G8427 DOCREV CUR MEDS BY ELIG CLIN: HCPCS | Performed by: INTERNAL MEDICINE

## 2019-08-16 PROCEDURE — 4040F PNEUMOC VAC/ADMIN/RCVD: CPT | Performed by: INTERNAL MEDICINE

## 2019-08-16 PROCEDURE — 1123F ACP DISCUSS/DSCN MKR DOCD: CPT | Performed by: INTERNAL MEDICINE

## 2019-08-16 ASSESSMENT — ENCOUNTER SYMPTOMS
RESPIRATORY NEGATIVE: 1
GASTROINTESTINAL NEGATIVE: 1
EYES NEGATIVE: 1
ALLERGIC/IMMUNOLOGIC NEGATIVE: 1

## 2019-08-19 ENCOUNTER — TELEPHONE (OUTPATIENT)
Dept: INTERNAL MEDICINE CLINIC | Age: 79
End: 2019-08-19

## 2019-08-20 ENCOUNTER — HOSPITAL ENCOUNTER (OUTPATIENT)
Age: 79
Discharge: HOME | End: 2019-08-20
Payer: MEDICARE

## 2019-08-20 DIAGNOSIS — I10 ESSENTIAL HYPERTENSION: ICD-10-CM

## 2019-08-20 DIAGNOSIS — E78.2 MIXED HYPERLIPIDEMIA: ICD-10-CM

## 2019-08-20 DIAGNOSIS — E03.9 ACQUIRED HYPOTHYROIDISM: ICD-10-CM

## 2019-08-20 DIAGNOSIS — R10.32 LEFT LOWER QUADRANT PAIN: ICD-10-CM

## 2019-08-20 LAB
ALBUMIN SERPL-MCNC: 4.4 GM/DL (ref 3.4–5)
ALP BLD-CCNC: 66 IU/L (ref 40–129)
ALT SERPL-CCNC: 21 U/L (ref 10–40)
ANION GAP SERPL CALCULATED.3IONS-SCNC: 9 MMOL/L (ref 4–16)
AST SERPL-CCNC: 27 IU/L (ref 15–37)
BASOPHILS ABSOLUTE: 0 K/CU MM
BASOPHILS RELATIVE PERCENT: 0.6 % (ref 0–1)
BILIRUB SERPL-MCNC: 0.5 MG/DL (ref 0–1)
BUN BLDV-MCNC: 22 MG/DL (ref 6–23)
CALCIUM SERPL-MCNC: 10 MG/DL (ref 8.3–10.6)
CHLORIDE BLD-SCNC: 104 MMOL/L (ref 99–110)
CHOLESTEROL, FASTING: 158 MG/DL
CO2: 30 MMOL/L (ref 21–32)
CREAT SERPL-MCNC: 1.1 MG/DL (ref 0.6–1.1)
DIFFERENTIAL TYPE: ABNORMAL
EOSINOPHILS ABSOLUTE: 0.2 K/CU MM
EOSINOPHILS RELATIVE PERCENT: 2.5 % (ref 0–3)
GFR AFRICAN AMERICAN: 58 ML/MIN/1.73M2
GFR NON-AFRICAN AMERICAN: 48 ML/MIN/1.73M2
GLUCOSE BLD-MCNC: 130 MG/DL (ref 70–99)
HCT VFR BLD CALC: 42.4 % (ref 37–47)
HDLC SERPL-MCNC: 66 MG/DL
HEMOGLOBIN: 13.8 GM/DL (ref 12.5–16)
IMMATURE NEUTROPHIL %: 0.1 % (ref 0–0.43)
LDL CHOLESTEROL DIRECT: 81 MG/DL
LYMPHOCYTES ABSOLUTE: 2.3 K/CU MM
LYMPHOCYTES RELATIVE PERCENT: 31.8 % (ref 24–44)
MCH RBC QN AUTO: 30.3 PG (ref 27–31)
MCHC RBC AUTO-ENTMCNC: 32.5 % (ref 32–36)
MCV RBC AUTO: 93 FL (ref 78–100)
MONOCYTES ABSOLUTE: 0.6 K/CU MM
MONOCYTES RELATIVE PERCENT: 7.7 % (ref 0–4)
PDW BLD-RTO: 13.2 % (ref 11.7–14.9)
PLATELET # BLD: 206 K/CU MM (ref 140–440)
PMV BLD AUTO: 10.4 FL (ref 7.5–11.1)
POTASSIUM SERPL-SCNC: 5 MMOL/L (ref 3.5–5.1)
RBC # BLD: 4.56 M/CU MM (ref 4.2–5.4)
SEGMENTED NEUTROPHILS ABSOLUTE COUNT: 4.2 K/CU MM
SEGMENTED NEUTROPHILS RELATIVE PERCENT: 57.3 % (ref 36–66)
SODIUM BLD-SCNC: 143 MMOL/L (ref 135–145)
TOTAL IMMATURE NEUTOROPHIL: 0.01 K/CU MM
TOTAL PROTEIN: 7.7 GM/DL (ref 6.4–8.2)
TRIGLYCERIDE, FASTING: 99 MG/DL
TSH HIGH SENSITIVITY: 3.86 UIU/ML (ref 0.27–4.2)
WBC # BLD: 7.2 K/CU MM (ref 4–10.5)

## 2019-08-20 PROCEDURE — 80061 LIPID PANEL: CPT

## 2019-08-20 PROCEDURE — 80053 COMPREHEN METABOLIC PANEL: CPT

## 2019-08-20 PROCEDURE — 85025 COMPLETE CBC W/AUTO DIFF WBC: CPT

## 2019-08-20 PROCEDURE — 84443 ASSAY THYROID STIM HORMONE: CPT

## 2019-08-20 PROCEDURE — 36415 COLL VENOUS BLD VENIPUNCTURE: CPT

## 2019-08-28 RX ORDER — BENAZEPRIL HYDROCHLORIDE 20 MG/1
TABLET ORAL
Qty: 90 TABLET | Refills: 1 | Status: SHIPPED | OUTPATIENT
Start: 2019-08-28 | End: 2019-11-25 | Stop reason: SDUPTHER

## 2019-08-28 RX ORDER — ATORVASTATIN CALCIUM 10 MG/1
TABLET, FILM COATED ORAL
Qty: 90 TABLET | Refills: 1 | Status: SHIPPED | OUTPATIENT
Start: 2019-08-28 | End: 2019-11-25 | Stop reason: SDUPTHER

## 2019-08-28 RX ORDER — LEVOTHYROXINE SODIUM 0.05 MG/1
TABLET ORAL
Qty: 90 TABLET | Refills: 1 | Status: SHIPPED | OUTPATIENT
Start: 2019-08-28 | End: 2019-11-25 | Stop reason: SDUPTHER

## 2019-10-15 ENCOUNTER — OFFICE VISIT (OUTPATIENT)
Dept: CARDIOLOGY CLINIC | Age: 79
End: 2019-10-15
Payer: MEDICARE

## 2019-10-15 VITALS
HEIGHT: 63 IN | HEART RATE: 59 BPM | DIASTOLIC BLOOD PRESSURE: 64 MMHG | BODY MASS INDEX: 24.41 KG/M2 | WEIGHT: 137.8 LBS | SYSTOLIC BLOOD PRESSURE: 110 MMHG

## 2019-10-15 DIAGNOSIS — R07.9 CHEST PAIN, UNSPECIFIED TYPE: Primary | ICD-10-CM

## 2019-10-15 PROCEDURE — G8420 CALC BMI NORM PARAMETERS: HCPCS | Performed by: INTERNAL MEDICINE

## 2019-10-15 PROCEDURE — 4040F PNEUMOC VAC/ADMIN/RCVD: CPT | Performed by: INTERNAL MEDICINE

## 2019-10-15 PROCEDURE — 99214 OFFICE O/P EST MOD 30 MIN: CPT | Performed by: INTERNAL MEDICINE

## 2019-10-15 PROCEDURE — 93000 ELECTROCARDIOGRAM COMPLETE: CPT | Performed by: INTERNAL MEDICINE

## 2019-10-15 PROCEDURE — 1123F ACP DISCUSS/DSCN MKR DOCD: CPT | Performed by: INTERNAL MEDICINE

## 2019-10-15 PROCEDURE — 1036F TOBACCO NON-USER: CPT | Performed by: INTERNAL MEDICINE

## 2019-10-15 PROCEDURE — G8399 PT W/DXA RESULTS DOCUMENT: HCPCS | Performed by: INTERNAL MEDICINE

## 2019-10-15 PROCEDURE — G8427 DOCREV CUR MEDS BY ELIG CLIN: HCPCS | Performed by: INTERNAL MEDICINE

## 2019-10-15 PROCEDURE — G8484 FLU IMMUNIZE NO ADMIN: HCPCS | Performed by: INTERNAL MEDICINE

## 2019-10-15 PROCEDURE — 1090F PRES/ABSN URINE INCON ASSESS: CPT | Performed by: INTERNAL MEDICINE

## 2019-11-13 ENCOUNTER — OFFICE VISIT (OUTPATIENT)
Dept: INTERNAL MEDICINE CLINIC | Age: 79
End: 2019-11-13
Payer: MEDICARE

## 2019-11-13 VITALS
WEIGHT: 136 LBS | SYSTOLIC BLOOD PRESSURE: 130 MMHG | DIASTOLIC BLOOD PRESSURE: 60 MMHG | HEIGHT: 63 IN | HEART RATE: 80 BPM | BODY MASS INDEX: 24.1 KG/M2

## 2019-11-13 DIAGNOSIS — R00.2 HEART PALPITATIONS: ICD-10-CM

## 2019-11-13 DIAGNOSIS — K76.89 LIVER DYSFUNCTION: ICD-10-CM

## 2019-11-13 DIAGNOSIS — R10.32 LEFT LOWER QUADRANT PAIN: ICD-10-CM

## 2019-11-13 DIAGNOSIS — E03.9 ACQUIRED HYPOTHYROIDISM: ICD-10-CM

## 2019-11-13 DIAGNOSIS — K21.9 GASTROESOPHAGEAL REFLUX DISEASE WITHOUT ESOPHAGITIS: ICD-10-CM

## 2019-11-13 DIAGNOSIS — R73.9 HYPERGLYCEMIA: Primary | ICD-10-CM

## 2019-11-13 DIAGNOSIS — I49.3 PVCS (PREMATURE VENTRICULAR CONTRACTIONS): ICD-10-CM

## 2019-11-13 DIAGNOSIS — I10 ESSENTIAL HYPERTENSION: ICD-10-CM

## 2019-11-13 DIAGNOSIS — K52.9 COLITIS: ICD-10-CM

## 2019-11-13 DIAGNOSIS — E78.2 MIXED HYPERLIPIDEMIA: ICD-10-CM

## 2019-11-13 LAB — HBA1C MFR BLD: 5.5 %

## 2019-11-13 PROCEDURE — G8482 FLU IMMUNIZE ORDER/ADMIN: HCPCS | Performed by: INTERNAL MEDICINE

## 2019-11-13 PROCEDURE — G0008 ADMIN INFLUENZA VIRUS VAC: HCPCS | Performed by: INTERNAL MEDICINE

## 2019-11-13 PROCEDURE — G8427 DOCREV CUR MEDS BY ELIG CLIN: HCPCS | Performed by: INTERNAL MEDICINE

## 2019-11-13 PROCEDURE — 99214 OFFICE O/P EST MOD 30 MIN: CPT | Performed by: INTERNAL MEDICINE

## 2019-11-13 PROCEDURE — 1036F TOBACCO NON-USER: CPT | Performed by: INTERNAL MEDICINE

## 2019-11-13 PROCEDURE — 1123F ACP DISCUSS/DSCN MKR DOCD: CPT | Performed by: INTERNAL MEDICINE

## 2019-11-13 PROCEDURE — 90653 IIV ADJUVANT VACCINE IM: CPT | Performed by: INTERNAL MEDICINE

## 2019-11-13 PROCEDURE — G8420 CALC BMI NORM PARAMETERS: HCPCS | Performed by: INTERNAL MEDICINE

## 2019-11-13 PROCEDURE — G8399 PT W/DXA RESULTS DOCUMENT: HCPCS | Performed by: INTERNAL MEDICINE

## 2019-11-13 PROCEDURE — 83036 HEMOGLOBIN GLYCOSYLATED A1C: CPT | Performed by: INTERNAL MEDICINE

## 2019-11-13 PROCEDURE — 4040F PNEUMOC VAC/ADMIN/RCVD: CPT | Performed by: INTERNAL MEDICINE

## 2019-11-13 PROCEDURE — 1090F PRES/ABSN URINE INCON ASSESS: CPT | Performed by: INTERNAL MEDICINE

## 2019-11-13 ASSESSMENT — ENCOUNTER SYMPTOMS
RESPIRATORY NEGATIVE: 1
ALLERGIC/IMMUNOLOGIC NEGATIVE: 1
EYES NEGATIVE: 1

## 2019-11-25 RX ORDER — LEVOTHYROXINE SODIUM 0.05 MG/1
TABLET ORAL
Qty: 90 TABLET | Refills: 1 | Status: SHIPPED | OUTPATIENT
Start: 2019-11-25 | End: 2020-02-18 | Stop reason: SDUPTHER

## 2019-11-25 RX ORDER — BENAZEPRIL HYDROCHLORIDE 20 MG/1
TABLET ORAL
Qty: 90 TABLET | Refills: 1 | Status: SHIPPED | OUTPATIENT
Start: 2019-11-25 | End: 2020-02-18 | Stop reason: SDUPTHER

## 2019-11-25 RX ORDER — ATORVASTATIN CALCIUM 10 MG/1
TABLET, FILM COATED ORAL
Qty: 90 TABLET | Refills: 1 | Status: SHIPPED | OUTPATIENT
Start: 2019-11-25 | End: 2020-02-18 | Stop reason: SDUPTHER

## 2019-11-27 ENCOUNTER — TELEPHONE (OUTPATIENT)
Dept: CARDIOLOGY CLINIC | Age: 79
End: 2019-11-27

## 2019-12-04 ENCOUNTER — OFFICE VISIT (OUTPATIENT)
Dept: CARDIOLOGY CLINIC | Age: 79
End: 2019-12-04
Payer: MEDICARE

## 2019-12-04 VITALS
HEIGHT: 63 IN | BODY MASS INDEX: 23.92 KG/M2 | DIASTOLIC BLOOD PRESSURE: 74 MMHG | SYSTOLIC BLOOD PRESSURE: 130 MMHG | WEIGHT: 135 LBS | HEART RATE: 56 BPM

## 2019-12-04 DIAGNOSIS — I10 ESSENTIAL HYPERTENSION: Primary | ICD-10-CM

## 2019-12-04 PROCEDURE — 93000 ELECTROCARDIOGRAM COMPLETE: CPT | Performed by: NURSE PRACTITIONER

## 2019-12-04 PROCEDURE — G8420 CALC BMI NORM PARAMETERS: HCPCS | Performed by: NURSE PRACTITIONER

## 2019-12-04 PROCEDURE — 1123F ACP DISCUSS/DSCN MKR DOCD: CPT | Performed by: NURSE PRACTITIONER

## 2019-12-04 PROCEDURE — G8482 FLU IMMUNIZE ORDER/ADMIN: HCPCS | Performed by: NURSE PRACTITIONER

## 2019-12-04 PROCEDURE — 99212 OFFICE O/P EST SF 10 MIN: CPT | Performed by: NURSE PRACTITIONER

## 2019-12-04 PROCEDURE — G8427 DOCREV CUR MEDS BY ELIG CLIN: HCPCS | Performed by: NURSE PRACTITIONER

## 2019-12-04 PROCEDURE — G8399 PT W/DXA RESULTS DOCUMENT: HCPCS | Performed by: NURSE PRACTITIONER

## 2019-12-04 PROCEDURE — 1090F PRES/ABSN URINE INCON ASSESS: CPT | Performed by: NURSE PRACTITIONER

## 2019-12-04 PROCEDURE — 1036F TOBACCO NON-USER: CPT | Performed by: NURSE PRACTITIONER

## 2019-12-04 PROCEDURE — 4040F PNEUMOC VAC/ADMIN/RCVD: CPT | Performed by: NURSE PRACTITIONER

## 2020-01-09 ENCOUNTER — HOSPITAL ENCOUNTER (EMERGENCY)
Age: 80
Discharge: HOME | End: 2020-01-09
Attending: EMERGENCY MEDICINE
Payer: MEDICARE

## 2020-01-09 ENCOUNTER — APPOINTMENT (OUTPATIENT)
Dept: GENERAL RADIOLOGY | Age: 80
End: 2020-01-09
Payer: MEDICARE

## 2020-01-09 VITALS
BODY MASS INDEX: 23.74 KG/M2 | OXYGEN SATURATION: 100 % | WEIGHT: 134 LBS | DIASTOLIC BLOOD PRESSURE: 62 MMHG | SYSTOLIC BLOOD PRESSURE: 145 MMHG | HEART RATE: 56 BPM | RESPIRATION RATE: 20 BRPM | HEIGHT: 63 IN | TEMPERATURE: 98 F

## 2020-01-09 LAB
ALBUMIN SERPL-MCNC: 4.4 GM/DL (ref 3.4–5)
ALP BLD-CCNC: 64 IU/L (ref 40–129)
ALT SERPL-CCNC: 18 U/L (ref 10–40)
ANION GAP SERPL CALCULATED.3IONS-SCNC: 11 MMOL/L (ref 4–16)
AST SERPL-CCNC: 27 IU/L (ref 15–37)
BASOPHILS ABSOLUTE: 0.1 K/CU MM
BASOPHILS RELATIVE PERCENT: 0.8 % (ref 0–1)
BILIRUB SERPL-MCNC: 0.4 MG/DL (ref 0–1)
BUN BLDV-MCNC: 16 MG/DL (ref 6–23)
CALCIUM SERPL-MCNC: 9.5 MG/DL (ref 8.3–10.6)
CHLORIDE BLD-SCNC: 100 MMOL/L (ref 99–110)
CO2: 25 MMOL/L (ref 21–32)
CREAT SERPL-MCNC: 0.8 MG/DL (ref 0.6–1.1)
DIFFERENTIAL TYPE: ABNORMAL
EOSINOPHILS ABSOLUTE: 0.2 K/CU MM
EOSINOPHILS RELATIVE PERCENT: 2.3 % (ref 0–3)
GFR AFRICAN AMERICAN: >60 ML/MIN/1.73M2
GFR NON-AFRICAN AMERICAN: >60 ML/MIN/1.73M2
GLUCOSE BLD-MCNC: 106 MG/DL (ref 70–99)
HCT VFR BLD CALC: 41 % (ref 37–47)
HEMOGLOBIN: 13.3 GM/DL (ref 12.5–16)
IMMATURE NEUTROPHIL %: 0.2 % (ref 0–0.43)
LYMPHOCYTES ABSOLUTE: 2.1 K/CU MM
LYMPHOCYTES RELATIVE PERCENT: 33.2 % (ref 24–44)
MAGNESIUM: 2.2 MG/DL (ref 1.8–2.4)
MCH RBC QN AUTO: 30.3 PG (ref 27–31)
MCHC RBC AUTO-ENTMCNC: 32.4 % (ref 32–36)
MCV RBC AUTO: 93.4 FL (ref 78–100)
MONOCYTES ABSOLUTE: 0.5 K/CU MM
MONOCYTES RELATIVE PERCENT: 7.9 % (ref 0–4)
NUCLEATED RBC %: 0 %
PDW BLD-RTO: 12.8 % (ref 11.7–14.9)
PLATELET # BLD: 250 K/CU MM (ref 140–440)
PMV BLD AUTO: 9.9 FL (ref 7.5–11.1)
POTASSIUM SERPL-SCNC: 4.6 MMOL/L (ref 3.5–5.1)
RBC # BLD: 4.39 M/CU MM (ref 4.2–5.4)
SEGMENTED NEUTROPHILS ABSOLUTE COUNT: 3.6 K/CU MM
SEGMENTED NEUTROPHILS RELATIVE PERCENT: 55.6 % (ref 36–66)
SODIUM BLD-SCNC: 136 MMOL/L (ref 135–145)
T4 FREE: 1.23 NG/DL (ref 0.9–1.8)
TOTAL IMMATURE NEUTOROPHIL: 0.01 K/CU MM
TOTAL NUCLEATED RBC: 0 K/CU MM
TOTAL PROTEIN: 7.2 GM/DL (ref 6.4–8.2)
TROPONIN T: <0.01 NG/ML
TSH HIGH SENSITIVITY: 1.16 UIU/ML (ref 0.27–4.2)
WBC # BLD: 6.4 K/CU MM (ref 4–10.5)

## 2020-01-09 PROCEDURE — 36415 COLL VENOUS BLD VENIPUNCTURE: CPT

## 2020-01-09 PROCEDURE — 80053 COMPREHEN METABOLIC PANEL: CPT

## 2020-01-09 PROCEDURE — 93010 ELECTROCARDIOGRAM REPORT: CPT | Performed by: INTERNAL MEDICINE

## 2020-01-09 PROCEDURE — 84443 ASSAY THYROID STIM HORMONE: CPT

## 2020-01-09 PROCEDURE — 85025 COMPLETE CBC W/AUTO DIFF WBC: CPT

## 2020-01-09 PROCEDURE — 93005 ELECTROCARDIOGRAM TRACING: CPT | Performed by: EMERGENCY MEDICINE

## 2020-01-09 PROCEDURE — 83735 ASSAY OF MAGNESIUM: CPT

## 2020-01-09 PROCEDURE — 99285 EMERGENCY DEPT VISIT HI MDM: CPT

## 2020-01-09 PROCEDURE — 84439 ASSAY OF FREE THYROXINE: CPT

## 2020-01-09 PROCEDURE — 84484 ASSAY OF TROPONIN QUANT: CPT

## 2020-01-09 PROCEDURE — 71046 X-RAY EXAM CHEST 2 VIEWS: CPT

## 2020-01-09 NOTE — ED NOTES
Pt on cot, awake and alert. Pt denies pain. Updated on plan of care.      Destiny Negrete RN  01/09/20 [id number]

## 2020-01-09 NOTE — ED PROVIDER NOTES
Triage Chief Complaint:   Palpitations      [place]:  Adama Valle is a 78 y.o. female that presents to the emergency department stating she has had palpitations \"for quite some time. \"  She states she seen Dr. Jimmy Harris in the past and was noted to have PVCs. She states she normally has a lower heart rate in the 50s to 60s. She states she feels like it occasionally groups beats. She denies a racing heart. Denies any dizziness, chest pain, shortness of breath, diaphoresis, near syncope or syncope. States she has worn several Holter monitors. Denies any known thyroid disease. Per chart review patient does have a history of V. tach in 2017. Does have a chart history of frequent PVCs with sometimes couplets. Past Medical History:   Diagnosis Date    Acute deep vein thrombosis (DVT) of distal vein of left lower extremity ([address] 10/23/2016    DVT involving left peroneal, posterior and anterior tibial veins 10/16 Treated for at least 3 months and repeat venous doppler was negative and was discontinued     Axillary adenopathy 8/28/2014    US of axilla 6/14 showed benign lymph nodes    C. difficile diarrhea 11/18/2016    Treated few times and now is normal    CAD (coronary artery disease)     Chest discomfort 8/1/2017    Colitis 12/13    EGD,COLONOSCOPY, SBFT normal. Dr. Nathaniel Enrique    Cyst of right ovary 9/6/2016    Seen Dr Clarissa Dickens and had several US per pt.  Dizzy spells 6/5/2017    H/O 24 hour EKG monitoring 7/27/09    NSR, few PVCs and occasional couplet noted (total 1007 PVCs), no pauses noted.  H/O colonoscopy 10/09, 12/13    f/u in 5 years    H/O echocardiogram 9/17/12    Normal LV size and function, mild mitral and tricuspid regurg., EF 60%.  H/O mitral valve prolapse 2003    MR mild    H/O myocardial perfusion scan 12/16/14    Stress Cardiolite. Normal perfusion in the distribution of all coronaries, normal LV size and function, EF 70%.     H/O screening mammography 8/11    Dr Lucius Gitelman History of cardiac monitoring 06/05/2017    Abnormal-SR with complex ventricular arrhythmias and frequent PVCs. No symptoms during marked bradycardia or during the fastest rate of 143 with nonsustained three-beat runs of ventricular tachycardia.  History of cardiac monitoring 08/23/2017    Abnormal event monitor findings suggestive of predominantly sinus bradycardia with isolated and frequent low-grade ventricular ectopy with symptoms reported.  Hx of Doppler ultrasound 10/17/2017    duplex venous doppler-minimal reflux noted in the left CFV    Hyperlipidemia     Hypertension     Hypothyroidism     Liver dysfunction     liver bx 10/09,mild steaotosis    Osteoarthritis of cervical spine 6/7/2016    Ovarian cyst     Papanicolaou smear 09/14/2016    Dr. Chun Mitchell PVCs (premature ventricular contractions)     symptomatic frequent PVCs, sees Dr. Olena Loera Ventricular tachycardia ([facility] [address] 8/1/2017    Exercise induced 8/1/17     Past Surgical History:   Procedure Laterality Date    APPENDECTOMY      BREAST BIOPSY      CATARACT REMOVAL Bilateral     COLONOSCOPY  12/3/13,2-28-17    2.28.17 no need for follow up due to pt's advanced age per Dr. Daily Michaels.  DIAGNOSTIC CARDIAC CATH LAB PROCEDURE  08/03/2017    Normal coronaries, EF 70%.     HYSTERECTOMY  1961    LIVER BIOPSY  10/09    steasosis    UPPER GASTROINTESTINAL ENDOSCOPY  11/02/2017    Dr. Daily Michaels     Family History   Problem Relation Age of Onset    Heart Surgery Mother         CABG    Rheum Arthritis Mother     Osteoarthritis Mother     Hypertension Mother     High Cholesterol Mother     Migraines Mother     Stroke Mother     Heart Disease Mother     Heart Disease Brother     Heart Failure Maternal Grandmother      Social History     Socioeconomic History    Marital status:      Spouse name: Not on file    Number of children: Not on file    Years of education: Not on file    Highest education level: Not on file   Occupational History    Not Interval 184 ms    QRS Duration 72 ms    Q-T Interval 416 ms    QTc Calculation (Bazett) 416 ms    P Axis 77 degrees    R Axis 66 degrees    T Axis 36 degrees    Diagnosis       Sinus rhythm with premature atrial complexes  Otherwise normal ECG  When compared with ECG of 28-APR-2018 12:51,  premature ventricular complexes are no longer present  premature atrial complexes are now present          Radiographs:  [] Radiologist's Wet Read Report Reviewed:      XR CHEST STANDARD (2 VW) (Final result)   Result time 01/09/20 11:00:03   Final result by Carlos Trevino MD (01/09/20 11:00:03)                Impression:    No acute process. Narrative:    EXAMINATION:  TWO XRAY VIEWS OF THE CHEST    1/9/2020 10:30 am    COMPARISON:  June 14, 2019    HISTORY:  ORDERING SYSTEM PROVIDED HISTORY: Chest pain  TECHNOLOGIST PROVIDED HISTORY:  Reason for exam:->Chest pain  Reason for Exam: Chest pain  Acuity: Acute  Type of Exam: Initial  Additional signs and symptoms: female that presents to the emergency  department stating she has had palpitations \"for quite some time. \"  She  states she seen Dr. Brian Appiah in the past and was noted to have PVCs.  She  states she normally has a lower heart rate in the 50s to 62s.  She states she  feels like it occasionally groups beats    FINDINGS:  Stable cardiomediastinal silhouette.  There is no focal consolidation,  pleural effusion, or pneumothorax.  The osseous structures are stable. [] Discussed with Radiologist:     [] The following radiograph was interpreted by myself in the absence of a radiologist:     EKG: (All EKG's are interpreted by myself in the absence of a cardiologist)  The Ekg interpreted by me shows  normal sinus rhythm with a rate of 60 with PACs  Axis is   Normal  QTc is  normal  Intervals and Durations are unremarkable. ST Segments: no acute change  No significant change from prior EKG dated 12-4-2019      MDM:  Patient is normotensive.   Heart rate

## 2020-01-10 ENCOUNTER — TELEPHONE (OUTPATIENT)
Dept: INTERNAL MEDICINE CLINIC | Age: 80
End: 2020-01-10

## 2020-01-10 NOTE — TELEPHONE ENCOUNTER
[facility] Transitions Initial Follow Up Call    Outreach made within 2 business days of discharge: Yes    Patient: Luis Mckee Patient : 1940   MRN: [id number]  Reason for Admission: There are no discharge diagnoses documented for the most recent discharge. Discharge Date: 20       Spoke with: patient     Discharge department/facility: ED     TCM Interactive Patient Contact:  Was patient able to fill all prescriptions: Yes  Was patient instructed to bring all medications to the follow-up visit: Yes  Is patient taking all medications as directed in the discharge summary? Yes  Does patient understand their discharge instructions: Yes  Does patient have questions or concerns that need addressed prior to 7-14 day follow up office visit: no    Patient is scheduled with Cardiology and states she will make sure they send all their notes to us.      Follow Up  Future Appointments   Date Time Provider Elayne Murphy   2020  1:40 PM Jeremy Oglesby MD [facility]   2020  8:30 AM Jesus Junior MD [facility]   2020 10:00 AM Jeremy Oglesby MD [facility]       Karen Grey, [address] [place] [address] [place]

## 2020-01-13 ENCOUNTER — OFFICE VISIT (OUTPATIENT)
Dept: CARDIOLOGY CLINIC | Age: 80
End: 2020-01-13
Payer: MEDICARE

## 2020-01-13 ENCOUNTER — NURSE ONLY (OUTPATIENT)
Dept: CARDIOLOGY CLINIC | Age: 80
End: 2020-01-13
Payer: MEDICARE

## 2020-01-13 VITALS
BODY MASS INDEX: 24.03 KG/M2 | HEART RATE: 60 BPM | DIASTOLIC BLOOD PRESSURE: 70 MMHG | SYSTOLIC BLOOD PRESSURE: 130 MMHG | HEIGHT: 63 IN | WEIGHT: 135.6 LBS

## 2020-01-13 PROCEDURE — G8482 FLU IMMUNIZE ORDER/ADMIN: HCPCS | Performed by: INTERNAL MEDICINE

## 2020-01-13 PROCEDURE — G8399 PT W/DXA RESULTS DOCUMENT: HCPCS | Performed by: INTERNAL MEDICINE

## 2020-01-13 PROCEDURE — G8420 CALC BMI NORM PARAMETERS: HCPCS | Performed by: INTERNAL MEDICINE

## 2020-01-13 PROCEDURE — 1123F ACP DISCUSS/DSCN MKR DOCD: CPT | Performed by: INTERNAL MEDICINE

## 2020-01-13 PROCEDURE — 1036F TOBACCO NON-USER: CPT | Performed by: INTERNAL MEDICINE

## 2020-01-13 PROCEDURE — 99213 OFFICE O/P EST LOW 20 MIN: CPT | Performed by: INTERNAL MEDICINE

## 2020-01-13 PROCEDURE — 4040F PNEUMOC VAC/ADMIN/RCVD: CPT | Performed by: INTERNAL MEDICINE

## 2020-01-13 PROCEDURE — 1090F PRES/ABSN URINE INCON ASSESS: CPT | Performed by: INTERNAL MEDICINE

## 2020-01-13 PROCEDURE — G8427 DOCREV CUR MEDS BY ELIG CLIN: HCPCS | Performed by: INTERNAL MEDICINE

## 2020-01-13 NOTE — PROGRESS NOTES
CARDIOLOGY NOTE      1/13/2020    RE: Marry Schaefer  (1940)                               TO:  Dr. Yimi Adamson MD            Alexander Helm is a 78 y.o. female who was seen today for management of  HTN                      As in ED with palpitations              HPI:   Patient is here for    - Hypertension,is  well controlled, pt is  compliant with medicines  - Hyperlipidimea, lipids are in acceptable range.  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    Gastroesophageal reflux disease without esophagitis 11/28/2017    Ventricular tachycardia ([address] 08/01/2017    Cyst of right ovary 09/06/2016    Heart palpitations 11/20/2014    Colitis 11/14/2013    Essential hypertension     Mixed hyperlipidemia     PVCs (premature ventricular contractions)     H/O mitral valve prolapse     Liver dysfunction     Acquired hypothyroidism      Current Outpatient Medications   Medication Sig Dispense Refill    levothyroxine (SYNTHROID) 50 MCG tablet TAKE 1 TABLET BY MOUTH DAILY 90 tablet 1    atorvastatin (LIPITOR) 10 MG tablet TAKE 1 TABLET EVERY DAY 90 tablet 1    benazepril (LOTENSIN) 20 MG tablet TAKE 1 TABLET BY MOUTH DAILY 90 tablet 1    aspirin 81 MG tablet Take 81 mg by mouth daily Take 2 tabs by mouth in am and one tab at hs      dicyclomine (BENTYL) 10 MG capsule Take 1 capsule by mouth 2 times daily as needed (abdominal cramps) 180 capsule 1    Magnesium 125 MG CAPS Take by mouth daily      lactobacillus (CULTURELLE) capsule Take 1 capsule by mouth 2 times daily (with meals) 30 capsule 0    Coenzyme Q10 (CO Q 10) 100 MG CAPS Take by mouth daily       Cholecalciferol (VITAMIN D3) 2000 UNITS CAPS Take 1 capsule by mouth daily       Multiple Vitamins-Minerals (MULTIVITAMIN & MINERAL PO) Take 1 tablet by mouth daily      calcium carbonate (OSCAL) 500 MG TABS tablet Take 500 mg by mouth daily      nitroGLYCERIN (NITROSTAT) 0.4 MG SL tablet Place 1 tablet under the tongue every 5 minutes as needed for Chest pain (Patient not taking: Reported on 12/4/2019) 25 tablet 3     No current facility-administered medications for this visit. Allergies: Amiodarone and Amoxicillin  Past Medical History:   Diagnosis Date    Acute deep vein thrombosis (DVT) of distal vein of left lower extremity ([address] 10/23/2016    DVT involving left peroneal, posterior and anterior tibial veins 10/16 Treated for at least 3 months and repeat venous doppler was negative and was discontinued     Axillary adenopathy 8/28/2014    US of axilla 6/14 showed benign lymph nodes    C. difficile diarrhea 11/18/2016    Treated few times and now is normal    CAD (coronary artery disease)     Chest discomfort 8/1/2017    Colitis 12/13    EGD,COLONOSCOPY, SBFT normal. Dr. Desir Rye    Cyst of right ovary 9/6/2016    Seen Dr Pranay Smallwood and had several US per pt.  Dizzy spells 6/5/2017    H/O 24 hour EKG monitoring 7/27/09    NSR, few PVCs and occasional couplet noted (total 1007 PVCs), no pauses noted.  H/O colonoscopy 10/09, 12/13    f/u in 5 years    H/O echocardiogram 9/17/12    Normal LV size and function, mild mitral and tricuspid regurg., EF 60%.  H/O mitral valve prolapse 2003    MR mild    H/O myocardial perfusion scan 12/16/14    Stress Cardiolite. Normal perfusion in the distribution of all coronaries, normal LV size and function, EF 70%.  H/O screening mammography 8/11    Dr Yoly Fermin History of cardiac monitoring 06/05/2017    Abnormal-SR with complex ventricular arrhythmias and frequent PVCs. No symptoms during marked bradycardia or during the fastest rate of 143 with nonsustained three-beat runs of ventricular tachycardia.      History of cardiac 11/11/2013     Lab Results   Component Value Date    WBC 6.4 01/09/2020    HCT 41.0 01/09/2020    MCV 93.4 01/09/2020     01/09/2020     Lab Results   Component Value Date    CHOL 137 02/11/2017    TRIG 62 02/11/2017    HDL 66 08/20/2019    LDLDIRECT 81 08/20/2019     Lab Results   Component Value Date    ALT 18 01/09/2020    AST 27 01/09/2020     BMP:    Lab Results   Component Value Date     01/09/2020    K 4.6 01/09/2020     01/09/2020    CO2 25 01/09/2020    BUN 16 01/09/2020    CREATININE 0.8 01/09/2020     CMP:   Lab Results   Component Value Date     01/09/2020    K 4.6 01/09/2020     01/09/2020    CO2 25 01/09/2020    BUN 16 01/09/2020    PROT 7.2 01/09/2020    PROT 7.1 09/27/2012     TSH:    Lab Results   Component Value Date    TSH 3.44 01/29/2019    TSHHS 1.160 01/09/2020         Impression:    No diagnosis found. 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 Patient is advised about low sodium diet. Present medical regimen will be changed. - No CAD on [facility] 8/17      - arrythmias  Has APCs  Declined EP, increase Mag        -  LIPID MANAGEMENT:  Available lipid  lab data reviewed  and patient was given dietary advice. NCEP- ATP III guidelines reviewed with patient. -   Changes  in medicines made:  No                                        Rosie Zee MA  [facility]

## 2020-01-14 LAB
EKG ATRIAL RATE: 60 BPM
EKG DIAGNOSIS: NORMAL
EKG P AXIS: 77 DEGREES
EKG P-R INTERVAL: 184 MS
EKG Q-T INTERVAL: 416 MS
EKG QRS DURATION: 72 MS
EKG QTC CALCULATION (BAZETT): 416 MS
EKG R AXIS: 66 DEGREES
EKG T AXIS: 36 DEGREES
EKG VENTRICULAR RATE: 60 BPM

## 2020-02-18 ENCOUNTER — OFFICE VISIT (OUTPATIENT)
Dept: INTERNAL MEDICINE CLINIC | Age: 80
End: 2020-02-18
Payer: MEDICARE

## 2020-02-18 VITALS
OXYGEN SATURATION: 95 % | RESPIRATION RATE: 16 BRPM | SYSTOLIC BLOOD PRESSURE: 132 MMHG | WEIGHT: 134.6 LBS | BODY MASS INDEX: 23.84 KG/M2 | HEART RATE: 60 BPM | TEMPERATURE: 98.1 F | DIASTOLIC BLOOD PRESSURE: 60 MMHG

## 2020-02-18 PROCEDURE — G8482 FLU IMMUNIZE ORDER/ADMIN: HCPCS | Performed by: INTERNAL MEDICINE

## 2020-02-18 PROCEDURE — G8427 DOCREV CUR MEDS BY ELIG CLIN: HCPCS | Performed by: INTERNAL MEDICINE

## 2020-02-18 PROCEDURE — 1123F ACP DISCUSS/DSCN MKR DOCD: CPT | Performed by: INTERNAL MEDICINE

## 2020-02-18 PROCEDURE — G8399 PT W/DXA RESULTS DOCUMENT: HCPCS | Performed by: INTERNAL MEDICINE

## 2020-02-18 PROCEDURE — 99214 OFFICE O/P EST MOD 30 MIN: CPT | Performed by: INTERNAL MEDICINE

## 2020-02-18 PROCEDURE — 4040F PNEUMOC VAC/ADMIN/RCVD: CPT | Performed by: INTERNAL MEDICINE

## 2020-02-18 PROCEDURE — G8420 CALC BMI NORM PARAMETERS: HCPCS | Performed by: INTERNAL MEDICINE

## 2020-02-18 PROCEDURE — 1090F PRES/ABSN URINE INCON ASSESS: CPT | Performed by: INTERNAL MEDICINE

## 2020-02-18 PROCEDURE — 1036F TOBACCO NON-USER: CPT | Performed by: INTERNAL MEDICINE

## 2020-02-18 RX ORDER — ATORVASTATIN CALCIUM 10 MG/1
TABLET, FILM COATED ORAL
Qty: 90 TABLET | Refills: 1 | Status: SHIPPED | OUTPATIENT
Start: 2020-02-18 | End: 2020-06-16 | Stop reason: SDUPTHER

## 2020-02-18 RX ORDER — LEVOTHYROXINE SODIUM 0.05 MG/1
TABLET ORAL
Qty: 90 TABLET | Refills: 1 | Status: SHIPPED | OUTPATIENT
Start: 2020-02-18 | End: 2020-06-16 | Stop reason: SDUPTHER

## 2020-02-18 RX ORDER — DICYCLOMINE HYDROCHLORIDE 10 MG/1
10 CAPSULE ORAL 2 TIMES DAILY PRN
Qty: 180 CAPSULE | Refills: 1 | Status: SHIPPED | OUTPATIENT
Start: 2020-02-18 | End: 2020-06-16 | Stop reason: SDUPTHER

## 2020-02-18 RX ORDER — BENAZEPRIL HYDROCHLORIDE 20 MG/1
TABLET ORAL
Qty: 90 TABLET | Refills: 1 | Status: SHIPPED | OUTPATIENT
Start: 2020-02-18 | End: 2020-06-16 | Stop reason: SDUPTHER

## 2020-02-18 ASSESSMENT — PATIENT HEALTH QUESTIONNAIRE - PHQ9
SUM OF ALL RESPONSES TO PHQ QUESTIONS 1-9: 2
2. FEELING DOWN, DEPRESSED OR HOPELESS: 1
SUM OF ALL RESPONSES TO PHQ QUESTIONS 1-9: 2
SUM OF ALL RESPONSES TO PHQ9 QUESTIONS 1 & 2: 2
1. LITTLE INTEREST OR PLEASURE IN DOING THINGS: 1

## 2020-02-18 ASSESSMENT — ENCOUNTER SYMPTOMS
RESPIRATORY NEGATIVE: 1
EYES NEGATIVE: 1
ALLERGIC/IMMUNOLOGIC NEGATIVE: 1
GASTROINTESTINAL NEGATIVE: 1

## 2020-02-24 PROCEDURE — 93228 REMOTE 30 DAY ECG REV/REPORT: CPT | Performed by: INTERNAL MEDICINE

## 2020-02-27 ENCOUNTER — TELEPHONE (OUTPATIENT)
Dept: CARDIOLOGY CLINIC | Age: 80
End: 2020-02-27

## 2020-03-10 ENCOUNTER — OFFICE VISIT (OUTPATIENT)
Dept: INTERNAL MEDICINE CLINIC | Age: 80
End: 2020-03-10
Payer: MEDICARE

## 2020-03-10 VITALS
TEMPERATURE: 98.9 F | WEIGHT: 135 LBS | SYSTOLIC BLOOD PRESSURE: 122 MMHG | OXYGEN SATURATION: 98 % | BODY MASS INDEX: 23.91 KG/M2 | DIASTOLIC BLOOD PRESSURE: 70 MMHG | HEART RATE: 58 BPM | RESPIRATION RATE: 16 BRPM

## 2020-03-10 LAB
BILIRUBIN, POC: NEGATIVE
BLOOD URINE, POC: NEGATIVE
CLARITY, POC: CLEAR
COLOR, POC: YELLOW
GLUCOSE URINE, POC: NEGATIVE
KETONES, POC: NEGATIVE
LEUKOCYTE EST, POC: ABNORMAL
NITRITE, POC: NEGATIVE
PH, POC: 6
PROTEIN, POC: NEGATIVE
SPECIFIC GRAVITY, POC: 1.01
UROBILINOGEN, POC: 0.2

## 2020-03-10 PROCEDURE — 1036F TOBACCO NON-USER: CPT | Performed by: INTERNAL MEDICINE

## 2020-03-10 PROCEDURE — G8482 FLU IMMUNIZE ORDER/ADMIN: HCPCS | Performed by: INTERNAL MEDICINE

## 2020-03-10 PROCEDURE — 1123F ACP DISCUSS/DSCN MKR DOCD: CPT | Performed by: INTERNAL MEDICINE

## 2020-03-10 PROCEDURE — 4040F PNEUMOC VAC/ADMIN/RCVD: CPT | Performed by: INTERNAL MEDICINE

## 2020-03-10 PROCEDURE — 1090F PRES/ABSN URINE INCON ASSESS: CPT | Performed by: INTERNAL MEDICINE

## 2020-03-10 PROCEDURE — G8420 CALC BMI NORM PARAMETERS: HCPCS | Performed by: INTERNAL MEDICINE

## 2020-03-10 PROCEDURE — 81002 URINALYSIS NONAUTO W/O SCOPE: CPT | Performed by: INTERNAL MEDICINE

## 2020-03-10 PROCEDURE — 99213 OFFICE O/P EST LOW 20 MIN: CPT | Performed by: INTERNAL MEDICINE

## 2020-03-10 PROCEDURE — G8427 DOCREV CUR MEDS BY ELIG CLIN: HCPCS | Performed by: INTERNAL MEDICINE

## 2020-03-10 PROCEDURE — G8399 PT W/DXA RESULTS DOCUMENT: HCPCS | Performed by: INTERNAL MEDICINE

## 2020-03-10 NOTE — PROGRESS NOTES
as directed sooner if needed. Questions answered and patient verbalizes understanding. Call for any problems, questions, or concerns. Allergies   Allergen Reactions    Amiodarone      Severe hair loss    Amoxicillin Other (See Comments)     Pt states she got C-diff. , so she doesn't like atb     Current Outpatient Medications   Medication Sig Dispense Refill    atorvastatin (LIPITOR) 10 MG tablet TAKE 1 TABLET EVERY DAY 90 tablet 1    benazepril (LOTENSIN) 20 MG tablet TAKE 1 TABLET BY MOUTH DAILY 90 tablet 1    dicyclomine (BENTYL) 10 MG capsule Take 1 capsule by mouth 2 times daily as needed (abdominal cramps) 180 capsule 1    levothyroxine (SYNTHROID) 50 MCG tablet TAKE 1 TABLET BY MOUTH DAILY 90 tablet 1    aspirin 81 MG tablet Take 81 mg by mouth daily Take 2 tabs by mouth in am and one tab at hs      Magnesium 125 MG CAPS Take by mouth daily      lactobacillus (CULTURELLE) capsule Take 1 capsule by mouth 2 times daily (with meals) 30 capsule 0    Coenzyme Q10 (CO Q 10) 100 MG CAPS Take by mouth daily       Cholecalciferol (VITAMIN D3) 2000 UNITS CAPS Take 1 capsule by mouth daily       Multiple Vitamins-Minerals (MULTIVITAMIN & MINERAL PO) Take 1 tablet by mouth daily      calcium carbonate (OSCAL) 500 MG TABS tablet Take 500 mg by mouth daily      nitroGLYCERIN (NITROSTAT) 0.4 MG SL tablet Place 1 tablet under the tongue every 5 minutes as needed for Chest pain (Patient not taking: Reported on 3/10/2020) 25 tablet 3     No current facility-administered medications for this visit.       Past Medical History:   Diagnosis Date    Acute deep vein thrombosis (DVT) of distal vein of left lower extremity ([address] 10/23/2016    DVT involving left peroneal, posterior and anterior tibial veins 10/16 Treated for at least 3 months and repeat venous doppler was negative and was discontinued     Axillary adenopathy 8/28/2014    US of axilla 6/14 showed benign lymph nodes    C. difficile diarrhea 11/18/2016    Treated few times and now is normal    CAD (coronary artery disease)     Chest discomfort 8/1/2017    Colitis 12/13    EGD,COLONOSCOPY, SBFT normal. Dr. Aguilar Kos    Cyst of right ovary 9/6/2016    Seen Dr Sarah West and had several US per pt.  Dizzy spells 6/5/2017    H/O 24 hour EKG monitoring 7/27/09    NSR, few PVCs and occasional couplet noted (total 1007 PVCs), no pauses noted.  H/O colonoscopy 10/09, 12/13    f/u in 5 years    H/O echocardiogram 9/17/12    Normal LV size and function, mild mitral and tricuspid regurg., EF 60%.  H/O mitral valve prolapse 2003    MR mild    H/O myocardial perfusion scan 12/16/14    Stress Cardiolite. Normal perfusion in the distribution of all coronaries, normal LV size and function, EF 70%.  H/O screening mammography 8/11    Dr April Tomas History of cardiac monitoring 06/05/2017    Abnormal-SR with complex ventricular arrhythmias and frequent PVCs. No symptoms during marked bradycardia or during the fastest rate of 143 with nonsustained three-beat runs of ventricular tachycardia.  History of cardiac monitoring 08/23/2017    Abnormal event monitor findings suggestive of predominantly sinus bradycardia with isolated and frequent low-grade ventricular ectopy with symptoms reported.      Hx of Doppler ultrasound 10/17/2017    duplex venous doppler-minimal reflux noted in the left CFV    Hyperlipidemia     Hypertension     Hypothyroidism     Liver dysfunction     liver bx 10/09,mild steaotosis    Osteoarthritis of cervical spine 6/7/2016    Ovarian cyst     Papanicolaou smear 09/14/2016    Dr. April Tomas PVCs (premature ventricular contractions)     symptomatic frequent PVCs, sees Dr. Ayala Free Ventricular tachycardia ([address] 8/1/2017    Exercise induced 8/1/17     Past Surgical History:   Procedure Laterality Date    APPENDECTOMY      BREAST BIOPSY      CATARACT REMOVAL Bilateral     COLONOSCOPY  12/3/13,2-28-17 2.28.17 no need for follow up

## 2020-05-19 ENCOUNTER — OFFICE VISIT (OUTPATIENT)
Dept: INTERNAL MEDICINE CLINIC | Age: 80
End: 2020-05-19
Payer: MEDICARE

## 2020-05-19 VITALS
TEMPERATURE: 98.3 F | HEART RATE: 60 BPM | RESPIRATION RATE: 16 BRPM | SYSTOLIC BLOOD PRESSURE: 132 MMHG | BODY MASS INDEX: 23.42 KG/M2 | WEIGHT: 132.2 LBS | DIASTOLIC BLOOD PRESSURE: 76 MMHG | OXYGEN SATURATION: 96 %

## 2020-05-19 PROBLEM — R10.32 LEFT LOWER QUADRANT PAIN: Status: RESOLVED | Noted: 2019-08-16 | Resolved: 2020-05-19

## 2020-05-19 PROBLEM — R29.898 WEAKNESS OF BOTH LEGS: Status: ACTIVE | Noted: 2020-05-19

## 2020-05-19 LAB
A/G RATIO: 1.5 (ref 1.1–2.2)
ALBUMIN SERPL-MCNC: 4.2 G/DL (ref 3.4–5)
ALP BLD-CCNC: 57 U/L (ref 40–129)
ALT SERPL-CCNC: 18 U/L (ref 10–40)
ANION GAP SERPL CALCULATED.3IONS-SCNC: 12 MMOL/L (ref 3–16)
AST SERPL-CCNC: 27 U/L (ref 15–37)
BASOPHILS ABSOLUTE: 0.1 K/UL (ref 0–0.2)
BASOPHILS RELATIVE PERCENT: 0.9 %
BILIRUB SERPL-MCNC: 0.5 MG/DL (ref 0–1)
BUN BLDV-MCNC: 16 MG/DL (ref 7–20)
CALCIUM SERPL-MCNC: 9.4 MG/DL (ref 8.3–10.6)
CHLORIDE BLD-SCNC: 101 MMOL/L (ref 99–110)
CHOLESTEROL, FASTING: 177 MG/DL (ref 0–199)
CO2: 23 MMOL/L (ref 21–32)
CREAT SERPL-MCNC: 0.8 MG/DL (ref 0.6–1.2)
EOSINOPHILS ABSOLUTE: 0.1 K/UL (ref 0–0.6)
EOSINOPHILS RELATIVE PERCENT: 2 %
GFR AFRICAN AMERICAN: >60
GFR NON-AFRICAN AMERICAN: >60
GLOBULIN: 2.8 G/DL
GLUCOSE BLD-MCNC: 96 MG/DL (ref 70–99)
HCT VFR BLD CALC: 39.2 % (ref 36–48)
HDLC SERPL-MCNC: 70 MG/DL (ref 40–60)
HEMOGLOBIN: 13.3 G/DL (ref 12–16)
LDL CHOLESTEROL CALCULATED: 89 MG/DL
LYMPHOCYTES ABSOLUTE: 1.9 K/UL (ref 1–5.1)
LYMPHOCYTES RELATIVE PERCENT: 27.1 %
MCH RBC QN AUTO: 31.2 PG (ref 26–34)
MCHC RBC AUTO-ENTMCNC: 34.1 G/DL (ref 31–36)
MCV RBC AUTO: 91.5 FL (ref 80–100)
MONOCYTES ABSOLUTE: 0.6 K/UL (ref 0–1.3)
MONOCYTES RELATIVE PERCENT: 9 %
NEUTROPHILS ABSOLUTE: 4.3 K/UL (ref 1.7–7.7)
NEUTROPHILS RELATIVE PERCENT: 61 %
PDW BLD-RTO: 13.8 % (ref 12.4–15.4)
PLATELET # BLD: 244 K/UL (ref 135–450)
PMV BLD AUTO: 8.9 FL (ref 5–10.5)
POTASSIUM SERPL-SCNC: 4.4 MMOL/L (ref 3.5–5.1)
RBC # BLD: 4.28 M/UL (ref 4–5.2)
SODIUM BLD-SCNC: 136 MMOL/L (ref 136–145)
TOTAL PROTEIN: 7 G/DL (ref 6.4–8.2)
TRIGLYCERIDE, FASTING: 92 MG/DL (ref 0–150)
TSH SERPL DL<=0.05 MIU/L-ACNC: 1.28 UIU/ML (ref 0.27–4.2)
VLDLC SERPL CALC-MCNC: 18 MG/DL
WBC # BLD: 7.1 K/UL (ref 4–11)

## 2020-05-19 PROCEDURE — 1090F PRES/ABSN URINE INCON ASSESS: CPT | Performed by: INTERNAL MEDICINE

## 2020-05-19 PROCEDURE — 4040F PNEUMOC VAC/ADMIN/RCVD: CPT | Performed by: INTERNAL MEDICINE

## 2020-05-19 PROCEDURE — 1123F ACP DISCUSS/DSCN MKR DOCD: CPT | Performed by: INTERNAL MEDICINE

## 2020-05-19 PROCEDURE — G8399 PT W/DXA RESULTS DOCUMENT: HCPCS | Performed by: INTERNAL MEDICINE

## 2020-05-19 PROCEDURE — 36415 COLL VENOUS BLD VENIPUNCTURE: CPT | Performed by: INTERNAL MEDICINE

## 2020-05-19 PROCEDURE — G8427 DOCREV CUR MEDS BY ELIG CLIN: HCPCS | Performed by: INTERNAL MEDICINE

## 2020-05-19 PROCEDURE — 1036F TOBACCO NON-USER: CPT | Performed by: INTERNAL MEDICINE

## 2020-05-19 PROCEDURE — 99214 OFFICE O/P EST MOD 30 MIN: CPT | Performed by: INTERNAL MEDICINE

## 2020-05-19 PROCEDURE — G8420 CALC BMI NORM PARAMETERS: HCPCS | Performed by: INTERNAL MEDICINE

## 2020-05-19 ASSESSMENT — ENCOUNTER SYMPTOMS
ALLERGIC/IMMUNOLOGIC NEGATIVE: 1
COUGH: 0
GASTROINTESTINAL NEGATIVE: 1
SHORTNESS OF BREATH: 0
EYES NEGATIVE: 1
RESPIRATORY NEGATIVE: 1
WHEEZING: 0

## 2020-05-19 NOTE — PROGRESS NOTES
Readings from Last 3 Encounters:   05/19/20 132 lb 3.2 oz (60 kg)   03/10/20 135 lb (61.2 kg)   02/18/20 134 lb 9.6 oz (61.1 kg)      GENERAL: - Alert, oriented, pleasant, in no apparent distress. HEENT: - Conjunctiva pink, no scleral icterus. ENT clear. right ear canal normal TM normal.   NECK: -Supple. No jugular venous distention noted. No masses felt. No tenderness. CARDIOVASCULAR: - Normal S1 and S2    PULMONARY: - No respiratory distress. No wheezes or rales. ABDOMEN: - Soft and non-tender,no masses  ororganomegaly. EXTREMITIES: - No cyanosis, clubbing, or significant edema. SKIN: Skin is warm and dry. NEUROLOGICAL: - Cranial nerves II through XII are grossly intact. Ambulatory. No weakness of the legs. Strength 5/5 both legs. Can stand up on the toes. Reflexes normal.     IMPRESSION:    Encounter Diagnoses   Name Primary?  Essential hypertension     Gastroesophageal reflux disease without esophagitis     Heart palpitations     Liver dysfunction     Mixed hyperlipidemia     PVCs (premature ventricular contractions)     Acquired hypothyroidism     Colitis     Weakness of both legs        ASSESSMENT/PLAN:    Essential hypertension  Patient has HTN for 10 years  Patient is on benazepril 20 mg daily    Gastroesophageal reflux disease without esophagitis  Pt had EGD done in 11/17 : stopped taking PPI  Doing well with out it    Heart palpitations  Occasional palpitations     Liver dysfunction  liver bx 10/09,mild steaotosis  Liver function much better     Mixed hyperlipidemia  Hyperlipidemia is stable. Follow low cholesterol diet. Continue current treatment. Continue atorvastatin. PVCs (premature ventricular contractions)  Referred to Dr Farida Diaz. Recommended EP study. Pt declined. Pt has seen [facility] for 2nd opinion and Dr Amaya Grier did not recommend EP study    Acquired hypothyroidism  Pt is taking synthroid 50 mcg daily    Colitis  Dr. Stacy Santos saw patient .  Had colonosocpy 11/13 and

## 2020-05-22 ENCOUNTER — VIRTUAL VISIT (OUTPATIENT)
Dept: INTERNAL MEDICINE CLINIC | Age: 80
End: 2020-05-22
Payer: MEDICARE

## 2020-05-22 VITALS — TEMPERATURE: 97.5 F | BODY MASS INDEX: 23.44 KG/M2 | HEIGHT: 63 IN | WEIGHT: 132.28 LBS

## 2020-05-22 PROCEDURE — 4040F PNEUMOC VAC/ADMIN/RCVD: CPT | Performed by: INTERNAL MEDICINE

## 2020-05-22 PROCEDURE — 1123F ACP DISCUSS/DSCN MKR DOCD: CPT | Performed by: INTERNAL MEDICINE

## 2020-05-22 PROCEDURE — G0438 PPPS, INITIAL VISIT: HCPCS | Performed by: INTERNAL MEDICINE

## 2020-05-22 ASSESSMENT — LIFESTYLE VARIABLES
HOW OFTEN DURING THE LAST YEAR HAVE YOU FOUND THAT YOU WERE NOT ABLE TO STOP DRINKING ONCE YOU HAD STARTED: 0
HOW OFTEN DURING THE LAST YEAR HAVE YOU FAILED TO DO WHAT WAS NORMALLY EXPECTED FROM YOU BECAUSE OF DRINKING: 0
HAS A RELATIVE, FRIEND, DOCTOR, OR ANOTHER HEALTH PROFESSIONAL EXPRESSED CONCERN ABOUT YOUR DRINKING OR SUGGESTED YOU CUT DOWN: 0
AUDIT TOTAL SCORE: 4
HOW OFTEN DO YOU HAVE SIX OR MORE DRINKS ON ONE OCCASION: 0
HOW OFTEN DURING THE LAST YEAR HAVE YOU BEEN UNABLE TO REMEMBER WHAT HAPPENED THE NIGHT BEFORE BECAUSE YOU HAD BEEN DRINKING: 0
AUDIT-C TOTAL SCORE: 4
HOW MANY STANDARD DRINKS CONTAINING ALCOHOL DO YOU HAVE ON A TYPICAL DAY: 0
HOW OFTEN DURING THE LAST YEAR HAVE YOU HAD A FEELING OF GUILT OR REMORSE AFTER DRINKING: 0
HAVE YOU OR SOMEONE ELSE BEEN INJURED AS A RESULT OF YOUR DRINKING: 0
HOW OFTEN DO YOU HAVE A DRINK CONTAINING ALCOHOL: 4
HOW OFTEN DURING THE LAST YEAR HAVE YOU NEEDED AN ALCOHOLIC DRINK FIRST THING IN THE MORNING TO GET YOURSELF GOING AFTER A NIGHT OF HEAVY DRINKING: 0

## 2020-05-22 ASSESSMENT — PATIENT HEALTH QUESTIONNAIRE - PHQ9
SUM OF ALL RESPONSES TO PHQ QUESTIONS 1-9: 2
SUM OF ALL RESPONSES TO PHQ QUESTIONS 1-9: 2

## 2020-05-22 NOTE — PROGRESS NOTES
Medicare Annual Wellness Visit  Name: Yaniv Tim Date: 2020   MRN: [id number] Sex: Female   Age: 78 y.o. Ethnicity: Non-/Non    : 1940 Race: Lanie Castanon is here for Medicare AWV    Screenings for behavioral, psychosocial and functional/safety risks, and cognitive dysfunction are all negative except as indicated below. These results, as well as other patient data from the [address] form, are documented in Flowsheets linked to this Encounter. Allergies   Allergen Reactions    Amiodarone      Severe hair loss    Amoxicillin Other (See Comments)     Pt states she got C-diff. , so she doesn't like atb       Prior to Visit Medications    Medication Sig Taking?  Authorizing Provider   atorvastatin (LIPITOR) 10 MG tablet TAKE 1 TABLET EVERY DAY  Gerda Whiteside MD   benazepril (LOTENSIN) 20 MG tablet TAKE 1 TABLET BY MOUTH DAILY  Gerda Whiteside MD   dicyclomine (BENTYL) 10 MG capsule Take 1 capsule by mouth 2 times daily as needed (abdominal cramps)  Gerda Whiteside MD   levothyroxine (SYNTHROID) 50 MCG tablet TAKE 1 TABLET BY MOUTH DAILY  Gerda Whiteside MD   aspirin 81 MG tablet Take 81 mg by mouth daily Take 2 tabs by mouth in am and one tab at hs  Historical Provider, MD   Magnesium 125 MG CAPS Take by mouth daily  Historical Provider, MD   lactobacillus (CULTURELLE) capsule Take 1 capsule by mouth 2 times daily (with meals)  Stacy Garcia MD   Coenzyme Q10 (CO Q 10) 100 MG CAPS Take by mouth daily   Historical Provider, MD   Cholecalciferol (VITAMIN D3) 2000 UNITS CAPS Take 1 capsule by mouth daily   Historical Provider, MD   Multiple Vitamins-Minerals (MULTIVITAMIN & MINERAL PO) Take 1 tablet by mouth daily  Historical Provider, MD   calcium carbonate (OSCAL) 500 MG TABS tablet Take 500 mg by mouth daily  Historical Provider, MD       Past Medical History:   Diagnosis Date    Acute deep vein thrombosis (DVT) of distal vein of left lower extremity ([address] 10/23/2016    DVT involving left peroneal, posterior and anterior tibial veins 10/16 Treated for at least 3 months and repeat venous doppler was negative and was discontinued     Axillary adenopathy 8/28/2014    US of axilla 6/14 showed benign lymph nodes    C. difficile diarrhea 11/18/2016    Treated few times and now is normal    CAD (coronary artery disease)     Chest discomfort 8/1/2017    Colitis 12/13    EGD,COLONOSCOPY, SBFT normal. Dr. Shirin Suh    Cyst of right ovary 9/6/2016    Seen Dr Nitin Arevalo and had several US per pt.  Dizzy spells 6/5/2017    H/O 24 hour EKG monitoring 7/27/09    NSR, few PVCs and occasional couplet noted (total 1007 PVCs), no pauses noted.  H/O colonoscopy 10/09, 12/13    f/u in 5 years    H/O echocardiogram 9/17/12    Normal LV size and function, mild mitral and tricuspid regurg., EF 60%.  H/O mitral valve prolapse 2003    MR mild    H/O myocardial perfusion scan 12/16/14    Stress Cardiolite. Normal perfusion in the distribution of all coronaries, normal LV size and function, EF 70%.  H/O screening mammography 8/11    Dr Roseanna Kevin History of cardiac monitoring 06/05/2017    Abnormal-SR with complex ventricular arrhythmias and frequent PVCs. No symptoms during marked bradycardia or during the fastest rate of 143 with nonsustained three-beat runs of ventricular tachycardia.  History of cardiac monitoring 08/23/2017    Abnormal event monitor findings suggestive of predominantly sinus bradycardia with isolated and frequent low-grade ventricular ectopy with symptoms reported.      Hx of Doppler ultrasound 10/17/2017    duplex venous doppler-minimal reflux noted in the left CFV    Hyperlipidemia     Hypertension     Hypothyroidism     Liver dysfunction     liver bx 10/09,mild steaotosis    Osteoarthritis of cervical spine 6/7/2016    Ovarian cyst     Papanicolaou smear 09/14/2016    Dr. Roseanna Kevin PVCs (premature ventricular contractions)     symptomatic frequent PVCs, sees Dr. Holley Benjamin Ventricular tachycardia ([address] 8/1/2017    Exercise induced 8/1/17       Past Surgical History:   Procedure Laterality Date    APPENDECTOMY      BREAST BIOPSY      CATARACT REMOVAL Bilateral     COLONOSCOPY  12/3/13,2-28-17 2.28.17 no need for follow up due to pt's advanced age per Dr. Jm Keating.  DIAGNOSTIC CARDIAC CATH LAB PROCEDURE  08/03/2017    Normal coronaries, EF 70%.  HYSTERECTOMY  1961    LIVER BIOPSY  10/09    steasosis    UPPER GASTROINTESTINAL ENDOSCOPY  11/02/2017    Dr. Jm Keating       Family History   Problem Relation Age of Onset    Heart Surgery Mother         CABG    Rheum Arthritis Mother     Osteoarthritis Mother     Hypertension Mother     High Cholesterol Mother     Migraines Mother     Stroke Mother     Heart Disease Mother     Heart Disease Brother     Heart Failure Maternal Grandmother        CareTeam (Including outside providers/suppliers regularly involved in providing care):   Patient Care Team:  Solange Betancourt MD as PCP - General (Internal Medicine)  Solange Betancourt MD as PCP - [facility] Empaneled Provider  Helen Fernandez MD as Consulting Physician (Cardiology)  Connie Medina MD as Consulting Physician (Gastroenterology)  Sunshine Almanza MD as Consulting Physician (Electrophysiology)    Wt Readings from Last 3 Encounters:   05/22/20 132 lb 4.4 oz (60 kg)   05/19/20 132 lb 3.2 oz (60 kg)   03/10/20 135 lb (61.2 kg)     Vitals:    05/22/20 1044   Temp: 97.5 °F (36.4 °C)   TempSrc: Oral   Weight: 132 lb 4.4 oz (60 kg)   Height: 5' 2.99\" (1.6 m)     Body mass index is 23.44 kg/m². Based upon direct observation of the patient, evaluation of cognition reveals recent and remote memory intact. Patient's complete Health Risk Assessment and screening values have been reviewed and are found in Flowsheets. The following problems were reviewed today and where indicated follow up appointments were made and/or referrals ordered.     Positive Risk Factor Screenings Maintenance   Topic Date Due    DTaP/Tdap/Td vaccine (1 - Tdap) 07/19/1957    Shingles Vaccine (2 of 3) 11/20/2014    Annual Wellness Visit (AWV)  05/29/2019    Lipid screen  05/19/2021    TSH testing  05/19/2021    Potassium monitoring  05/19/2021    Creatinine monitoring  05/19/2021    Flu vaccine  Completed    Pneumococcal 65+ years Vaccine  Completed    DEXA (modify frequency per FRAX score)  Addressed    Hepatitis A vaccine  Aged Out    Hepatitis B vaccine  Aged Out    Hib vaccine  Aged Out    Meningococcal (ACWY) vaccine  Aged Out     Recommendations for [employer] Due: see orders and patient instructions/AVS.  . Recommended screening schedule for the next 5-10 years is provided to the patient in written form: see Patient Instructions/AVS.    Katelin Unger LPN, 9/06/2311, performed the documented evaluation under the direct supervision of the attending physician. London Doss is a 78 y.o. female being evaluated by a Virtual Visit (audio visit) encounter to address concerns as mentioned above. A caregiver was present when appropriate. Due to this being a TeleHealth encounter (During BYB-25 public health emergency), evaluation of the following organ systems was limited: Vitals/Constitutional/EENT/Resp/CV/GI//MS/Neuro/Skin/Heme-Lymph-Imm. Pursuant to the emergency declaration under the [facility], [address] [place] [address] authority and the Jenn [employer] and [employer] Act, this Virtual Visit was conducted with patient's (and/or legal guardian's) consent, to reduce the patient's risk of exposure to COVID-19 and provide necessary medical care. The patient (and/or legal guardian) has also been advised to contact this office for worsening conditions or problems, and seek emergency medical treatment and/or call 911 if deemed necessary.      Patient identification was verified at the start of the visit: Yes    Total time

## 2020-06-16 ENCOUNTER — OFFICE VISIT (OUTPATIENT)
Dept: INTERNAL MEDICINE CLINIC | Age: 80
End: 2020-06-16
Payer: MEDICARE

## 2020-06-16 VITALS
SYSTOLIC BLOOD PRESSURE: 120 MMHG | HEART RATE: 62 BPM | OXYGEN SATURATION: 98 % | DIASTOLIC BLOOD PRESSURE: 68 MMHG | TEMPERATURE: 98.8 F | BODY MASS INDEX: 23.81 KG/M2 | WEIGHT: 134.4 LBS | RESPIRATION RATE: 16 BRPM

## 2020-06-16 PROCEDURE — G8399 PT W/DXA RESULTS DOCUMENT: HCPCS | Performed by: INTERNAL MEDICINE

## 2020-06-16 PROCEDURE — 1123F ACP DISCUSS/DSCN MKR DOCD: CPT | Performed by: INTERNAL MEDICINE

## 2020-06-16 PROCEDURE — 1090F PRES/ABSN URINE INCON ASSESS: CPT | Performed by: INTERNAL MEDICINE

## 2020-06-16 PROCEDURE — 99213 OFFICE O/P EST LOW 20 MIN: CPT | Performed by: INTERNAL MEDICINE

## 2020-06-16 PROCEDURE — G8427 DOCREV CUR MEDS BY ELIG CLIN: HCPCS | Performed by: INTERNAL MEDICINE

## 2020-06-16 PROCEDURE — G8420 CALC BMI NORM PARAMETERS: HCPCS | Performed by: INTERNAL MEDICINE

## 2020-06-16 PROCEDURE — 4040F PNEUMOC VAC/ADMIN/RCVD: CPT | Performed by: INTERNAL MEDICINE

## 2020-06-16 PROCEDURE — 1036F TOBACCO NON-USER: CPT | Performed by: INTERNAL MEDICINE

## 2020-06-16 RX ORDER — LEVOTHYROXINE SODIUM 0.05 MG/1
TABLET ORAL
Qty: 90 TABLET | Refills: 1 | Status: SHIPPED | OUTPATIENT
Start: 2020-06-16 | End: 2020-09-22 | Stop reason: SDUPTHER

## 2020-06-16 RX ORDER — ATORVASTATIN CALCIUM 10 MG/1
TABLET, FILM COATED ORAL
Qty: 90 TABLET | Refills: 1 | Status: SHIPPED | OUTPATIENT
Start: 2020-06-16 | End: 2020-12-08 | Stop reason: SDUPTHER

## 2020-06-16 RX ORDER — BENAZEPRIL HYDROCHLORIDE 20 MG/1
TABLET ORAL
Qty: 90 TABLET | Refills: 1 | Status: SHIPPED | OUTPATIENT
Start: 2020-06-16 | End: 2020-12-08 | Stop reason: SDUPTHER

## 2020-06-16 RX ORDER — DICYCLOMINE HYDROCHLORIDE 10 MG/1
10 CAPSULE ORAL 2 TIMES DAILY PRN
Qty: 180 CAPSULE | Refills: 1 | Status: SHIPPED | OUTPATIENT
Start: 2020-06-16 | End: 2020-12-08

## 2020-06-16 NOTE — PROGRESS NOTES
Severe hair loss    Amoxicillin Other (See Comments)     Pt states she got C-diff. , so she doesn't like atb     Current Outpatient Medications   Medication Sig Dispense Refill    atorvastatin (LIPITOR) 10 MG tablet TAKE 1 TABLET EVERY DAY 90 tablet 1    benazepril (LOTENSIN) 20 MG tablet TAKE 1 TABLET BY MOUTH DAILY 90 tablet 1    dicyclomine (BENTYL) 10 MG capsule Take 1 capsule by mouth 2 times daily as needed (abdominal cramps) 180 capsule 1    levothyroxine (SYNTHROID) 50 MCG tablet TAKE 1 TABLET BY MOUTH DAILY 90 tablet 1    aspirin 81 MG tablet Take 81 mg by mouth daily Take 2 tabs by mouth in am and one tab at hs      Magnesium 125 MG CAPS Take by mouth daily      lactobacillus (CULTURELLE) capsule Take 1 capsule by mouth 2 times daily (with meals) 30 capsule 0    Coenzyme Q10 (CO Q 10) 100 MG CAPS Take by mouth daily       Cholecalciferol (VITAMIN D3) 2000 UNITS CAPS Take 1 capsule by mouth daily       Multiple Vitamins-Minerals (MULTIVITAMIN & MINERAL PO) Take 1 tablet by mouth daily      calcium carbonate (OSCAL) 500 MG TABS tablet Take 500 mg by mouth daily       No current facility-administered medications for this visit. Past Medical History:   Diagnosis Date    Acute deep vein thrombosis (DVT) of distal vein of left lower extremity ([address] 10/23/2016    DVT involving left peroneal, posterior and anterior tibial veins 10/16 Treated for at least 3 months and repeat venous doppler was negative and was discontinued     Axillary adenopathy 8/28/2014    US of axilla 6/14 showed benign lymph nodes    C. difficile diarrhea 11/18/2016    Treated few times and now is normal    CAD (coronary artery disease)     Chest discomfort 8/1/2017    Colitis 12/13    EGD,COLONOSCOPY, SBFT normal. Dr. Silverio Singh    Cyst of right ovary 9/6/2016    Seen Dr Usha Molina and had several US per pt.     Dizzy spells 6/5/2017    H/O 24 hour EKG monitoring 7/27/09    NSR, few PVCs and occasional couplet noted (total 1007 Alcohol use:  Yes     Alcohol/week: 0.0 standard drinks     Comment:  2 beers per day or 2 glasses of \"bubbly\"/day       LAB REVIEW:  CBC:   Lab Results   Component Value Date    WBC 7.1 05/19/2020    HGB 13.3 05/19/2020    HCT 39.2 05/19/2020     05/19/2020     Lipids:   Lab Results   Component Value Date    CHOL 137 02/11/2017    TRIG 62 02/11/2017    HDL 70 (H) 05/19/2020    LDLCALC 89 05/19/2020    LDLDIRECT 81 08/20/2019    TRIGLYCFAST 92 05/19/2020     Renal:   Lab Results   Component Value Date    BUN 16 05/19/2020    CREATININE 0.8 05/19/2020     05/19/2020    K 4.4 05/19/2020    ALT 18 05/19/2020    AST 27 05/19/2020    GLUCOSE 96 05/19/2020     PT/INR:   Lab Results   Component Value Date    INR 0.91 10/17/2017     A1C:   Lab Results   Component Value Date    LABA1C 5.5 11/13/2019           Girish Mann MD, 6/16/2020 , 11:04 AM

## 2020-08-11 ENCOUNTER — OFFICE VISIT (OUTPATIENT)
Dept: CARDIOLOGY CLINIC | Age: 80
End: 2020-08-11
Payer: MEDICARE

## 2020-08-11 VITALS
HEART RATE: 67 BPM | WEIGHT: 135.2 LBS | BODY MASS INDEX: 23.96 KG/M2 | SYSTOLIC BLOOD PRESSURE: 122 MMHG | DIASTOLIC BLOOD PRESSURE: 68 MMHG | HEIGHT: 63 IN

## 2020-08-11 PROCEDURE — 1123F ACP DISCUSS/DSCN MKR DOCD: CPT | Performed by: INTERNAL MEDICINE

## 2020-08-11 PROCEDURE — G8420 CALC BMI NORM PARAMETERS: HCPCS | Performed by: INTERNAL MEDICINE

## 2020-08-11 PROCEDURE — 4040F PNEUMOC VAC/ADMIN/RCVD: CPT | Performed by: INTERNAL MEDICINE

## 2020-08-11 PROCEDURE — 1036F TOBACCO NON-USER: CPT | Performed by: INTERNAL MEDICINE

## 2020-08-11 PROCEDURE — 99213 OFFICE O/P EST LOW 20 MIN: CPT | Performed by: INTERNAL MEDICINE

## 2020-08-11 PROCEDURE — G8399 PT W/DXA RESULTS DOCUMENT: HCPCS | Performed by: INTERNAL MEDICINE

## 2020-08-11 PROCEDURE — 1090F PRES/ABSN URINE INCON ASSESS: CPT | Performed by: INTERNAL MEDICINE

## 2020-08-11 PROCEDURE — 93000 ELECTROCARDIOGRAM COMPLETE: CPT | Performed by: INTERNAL MEDICINE

## 2020-08-11 PROCEDURE — G8427 DOCREV CUR MEDS BY ELIG CLIN: HCPCS | Performed by: INTERNAL MEDICINE

## 2020-09-22 ENCOUNTER — OFFICE VISIT (OUTPATIENT)
Dept: INTERNAL MEDICINE CLINIC | Age: 80
End: 2020-09-22
Payer: MEDICARE

## 2020-09-22 VITALS
SYSTOLIC BLOOD PRESSURE: 118 MMHG | TEMPERATURE: 98.2 F | RESPIRATION RATE: 16 BRPM | OXYGEN SATURATION: 99 % | BODY MASS INDEX: 23.45 KG/M2 | DIASTOLIC BLOOD PRESSURE: 70 MMHG | WEIGHT: 132.4 LBS | HEART RATE: 64 BPM

## 2020-09-22 PROCEDURE — G8427 DOCREV CUR MEDS BY ELIG CLIN: HCPCS | Performed by: INTERNAL MEDICINE

## 2020-09-22 PROCEDURE — G8399 PT W/DXA RESULTS DOCUMENT: HCPCS | Performed by: INTERNAL MEDICINE

## 2020-09-22 PROCEDURE — 4040F PNEUMOC VAC/ADMIN/RCVD: CPT | Performed by: INTERNAL MEDICINE

## 2020-09-22 PROCEDURE — 1090F PRES/ABSN URINE INCON ASSESS: CPT | Performed by: INTERNAL MEDICINE

## 2020-09-22 PROCEDURE — 99214 OFFICE O/P EST MOD 30 MIN: CPT | Performed by: INTERNAL MEDICINE

## 2020-09-22 PROCEDURE — 1036F TOBACCO NON-USER: CPT | Performed by: INTERNAL MEDICINE

## 2020-09-22 PROCEDURE — G8420 CALC BMI NORM PARAMETERS: HCPCS | Performed by: INTERNAL MEDICINE

## 2020-09-22 PROCEDURE — 1123F ACP DISCUSS/DSCN MKR DOCD: CPT | Performed by: INTERNAL MEDICINE

## 2020-09-22 RX ORDER — LEVOTHYROXINE SODIUM 0.05 MG/1
TABLET ORAL
Qty: 90 TABLET | Refills: 1 | Status: SHIPPED | OUTPATIENT
Start: 2020-09-22 | End: 2021-03-09 | Stop reason: SDUPTHER

## 2020-09-22 ASSESSMENT — ENCOUNTER SYMPTOMS
SHORTNESS OF BREATH: 0
RESPIRATORY NEGATIVE: 1
WHEEZING: 0
COUGH: 0
GASTROINTESTINAL NEGATIVE: 1
EYES NEGATIVE: 1
ALLERGIC/IMMUNOLOGIC NEGATIVE: 1

## 2020-09-22 NOTE — PROGRESS NOTES
Ova No  Patient's  is 1940  Seen in office on 2020      SUBJECTIVE:  Melina Ortiz keena 78 y. o.year old female presents today   Chief Complaint   Patient presents with    Hypertension    Medication Refill     Weakness of the legs has resolved. Pt is here for f/u of HTN , HLD PVCS, hypothyroidism  Pt states she is feeling well. Has no complaints  No chest pain. Patient has hypertension. Taking medications No headaches, no chest pain, no palpitations and no dizziness. Patient has hyperlipidemia. Taking medications. No abdominal pain, no nausea or vomiting. No myalgias. No palpitations. No dizziness. No syncope or near syncope  No falls. Taking medications regularly. No side effects noted. Review of Systems   Constitutional: Negative. HENT: Negative. Eyes: Negative. Respiratory: Negative. Negative for cough, shortness of breath and wheezing. Cardiovascular: Negative. Negative for chest pain, palpitations and leg swelling. Gastrointestinal: Negative. Endocrine: Negative. Genitourinary: Negative. Musculoskeletal: Negative. Skin: Negative. Allergic/Immunologic: Negative. Neurological: Negative. Hematological: Negative. Psychiatric/Behavioral: Negative. OBJECTIVE: /70   Pulse 64   Temp 98.2 °F (36.8 °C) (Oral)   Resp 16   Wt 132 lb 6.4 oz (60.1 kg)   SpO2 99%   BMI 23.45 kg/m²     Wt Readings from Last 3 Encounters:   20 132 lb 6.4 oz (60.1 kg)   20 135 lb 3.2 oz (61.3 kg)   20 134 lb 6.4 oz (61 kg)      GENERAL: - Alert, oriented, pleasant, in no apparent distress. HEENT: - Conjunctiva pink, no scleral icterus. ENT clear. NECK: -Supple. No jugular venous distention noted. No masses felt,  CARDIOVASCULAR: - Normal S1 and S2    PULMONARY: - No respiratory distress. No wheezes or rales. ABDOMEN: - Soft and non-tender,no masses  ororganomegaly. EXTREMITIES: - No cyanosis, clubbing, or significant edema.   SKIN: Skin is warm and dry. NEUROLOGICAL: - Cranial nerves II through XII are grossly intact. IMPRESSION:    Encounter Diagnoses   Name Primary?  Essential hypertension Yes    Mixed hyperlipidemia     PVCs (premature ventricular contractions)     Acquired hypothyroidism     Colitis     Gastroesophageal reflux disease without esophagitis     Need for prophylactic vaccination against diphtheria-tetanus-pertussis (DTP)     Need for prophylactic vaccination and inoculation against varicella        ASSESSMENT/PLAN:    Essential hypertension  Patient has HTN for > 10 years  Patient is on benazepril 20 mg daily    Mixed hyperlipidemia  Hyperlipidemia is stable. Follow low cholesterol diet. Continue current treatment. Continue atorvastatin     PVCs (premature ventricular contractions)  PVCs  Asymptomatic now. Feels well. Acquired hypothyroidism  Pt is taking synthroid 50 mcg daily    Colitis  Dr. Faviola Bermudez saw patient . Had colonosocpy 11/13 and SBFT   Pt takes bentyl prn. Has not used it for some time. Gastroesophageal reflux disease without esophagitis  Pt had EGD done in 11/17 : stopped taking PPI  Doing well with out it    Return to office in 3 months. No orders of the defined types were placed in this encounter. Mediations reviewed with the patient. Continue current medications. Appropriate prescriptions are addressed. After visit summeryprovided. Follow up as directed sooner if needed. Questions answered and patient verbalizes understanding. Call for any problems, questions, or concerns. Allergies   Allergen Reactions    Amiodarone      Severe hair loss    Amoxicillin Other (See Comments)     Pt states she got C-diff. , so she doesn't like atb     Current Outpatient Medications   Medication Sig Dispense Refill    atorvastatin (LIPITOR) 10 MG tablet TAKE 1 TABLET EVERY DAY 90 tablet 1    benazepril (LOTENSIN) 20 MG tablet TAKE 1 TABLET BY MOUTH DAILY 90 tablet 1    dicyclomine (BENTYL) 10 MG capsule Take 1 capsule by mouth 2 times daily as needed (abdominal cramps) 180 capsule 1    levothyroxine (SYNTHROID) 50 MCG tablet TAKE 1 TABLET BY MOUTH DAILY 90 tablet 1    aspirin 81 MG tablet Take 81 mg by mouth daily Take 2 tabs by mouth in am and one tab at hs      Magnesium 125 MG CAPS Take by mouth daily      lactobacillus (CULTURELLE) capsule Take 1 capsule by mouth 2 times daily (with meals) 30 capsule 0    Coenzyme Q10 (CO Q 10) 100 MG CAPS Take by mouth daily       Cholecalciferol (VITAMIN D3) 2000 UNITS CAPS Take 1 capsule by mouth daily       Multiple Vitamins-Minerals (MULTIVITAMIN & MINERAL PO) Take 1 tablet by mouth daily      calcium carbonate (OSCAL) 500 MG TABS tablet Take 500 mg by mouth daily       No current facility-administered medications for this visit. Past Medical History:   Diagnosis Date    Acute deep vein thrombosis (DVT) of distal vein of left lower extremity ([address] 10/23/2016    DVT involving left peroneal, posterior and anterior tibial veins 10/16 Treated for at least 3 months and repeat venous doppler was negative and was discontinued     Axillary adenopathy 8/28/2014    US of axilla 6/14 showed benign lymph nodes    C. difficile diarrhea 11/18/2016    Treated few times and now is normal    CAD (coronary artery disease)     Chest discomfort 8/1/2017    Colitis 12/13    EGD,COLONOSCOPY, SBFT normal. Dr. Lisa Rock    Cyst of right ovary 9/6/2016    Seen Dr Bill Ramey and had several US per pt.  Dizzy spells 6/5/2017    H/O 24 hour EKG monitoring 7/27/09    NSR, few PVCs and occasional couplet noted (total 1007 PVCs), no pauses noted.  H/O colonoscopy 10/09, 12/13    f/u in 5 years    H/O echocardiogram 9/17/12    Normal LV size and function, mild mitral and tricuspid regurg., EF 60%.  H/O mitral valve prolapse 2003    MR mild    H/O myocardial perfusion scan 12/16/14    Stress Cardiolite.   Normal perfusion in the distribution of all coronaries, normal LV size and function, EF 70%.  H/O screening mammography 8/11    Dr Caryn Mendoza History of cardiac monitoring 06/05/2017    Abnormal-SR with complex ventricular arrhythmias and frequent PVCs. No symptoms during marked bradycardia or during the fastest rate of 143 with nonsustained three-beat runs of ventricular tachycardia.  History of cardiac monitoring 08/23/2017    Abnormal event monitor findings suggestive of predominantly sinus bradycardia with isolated and frequent low-grade ventricular ectopy with symptoms reported.  Hx of Doppler ultrasound 10/17/2017    duplex venous doppler-minimal reflux noted in the left CFV    Hyperlipidemia     Hypertension     Hypothyroidism     Liver dysfunction     liver bx 10/09,mild steaotosis    Osteoarthritis of cervical spine 6/7/2016    Ovarian cyst     Papanicolaou smear 09/14/2016    Dr. Caryn Mendoza PVCs (premature ventricular contractions)     symptomatic frequent PVCs, sees Dr. Yan Heller Ventricular tachycardia ([facility] [address] 8/1/2017    Exercise induced 8/1/17     Past Surgical History:   Procedure Laterality Date    APPENDECTOMY      BREAST BIOPSY      CATARACT REMOVAL Bilateral     COLONOSCOPY  12/3/13,2-28-17    2.28.17 no need for follow up due to pt's advanced age per Dr. Noel Alex.  DIAGNOSTIC CARDIAC CATH LAB PROCEDURE  08/03/2017    Normal coronaries, EF 70%.  HYSTERECTOMY  1961    LIVER BIOPSY  10/09    steasosis    UPPER GASTROINTESTINAL ENDOSCOPY  11/02/2017    Dr. Agnes Dow History     Tobacco Use    Smoking status: Never Smoker    Smokeless tobacco: Never Used   Substance Use Topics    Alcohol use:  Yes     Alcohol/week: 0.0 standard drinks     Comment:  2 beers per day or 2 glasses of \"bubbly\"/day       LAB REVIEW:  CBC:   Lab Results   Component Value Date    WBC 7.1 05/19/2020    HGB 13.3 05/19/2020    HCT 39.2 05/19/2020     05/19/2020     Lipids:   Lab Results   Component Value Date    CHOL 137 02/11/2017    TRIG 62 02/11/2017    HDL 70 (H) 05/19/2020    LDLCALC 89 05/19/2020    LDLDIRECT 81 08/20/2019    TRIGLYCFAST 92 05/19/2020     Renal:   Lab Results   Component Value Date    BUN 16 05/19/2020    CREATININE 0.8 05/19/2020     05/19/2020    K 4.4 05/19/2020    ALT 18 05/19/2020    AST 27 05/19/2020    GLUCOSE 96 05/19/2020     PT/INR:   Lab Results   Component Value Date    INR 0.91 10/17/2017     A1C:   Lab Results   Component Value Date    LABA1C 5.5 11/13/2019           Axel Stokes MD, 9/22/2020 , 9:13 AM

## 2020-09-22 NOTE — ASSESSMENT & PLAN NOTE
Dr. Neno Suarez saw patient . Had colonosocpy 11/13 and SBFT   Pt takes bentyl prn. Has not used it for some time.

## 2020-09-22 NOTE — ASSESSMENT & PLAN NOTE
Hyperlipidemia is stable. Follow low cholesterol diet. Continue current treatment.   Continue atorvastatin

## 2020-10-14 ENCOUNTER — NURSE ONLY (OUTPATIENT)
Dept: INTERNAL MEDICINE CLINIC | Age: 80
End: 2020-10-14
Payer: MEDICARE

## 2020-10-14 PROCEDURE — 90694 VACC AIIV4 NO PRSRV 0.5ML IM: CPT | Performed by: INTERNAL MEDICINE

## 2020-10-14 PROCEDURE — G0008 ADMIN INFLUENZA VIRUS VAC: HCPCS | Performed by: INTERNAL MEDICINE

## 2020-10-14 NOTE — PROGRESS NOTES
Vaccine Information Sheet, \"Influenza - Inactivated\"  given to João Baron, or parent/legal guardian of  João Baron and verbalized understanding. Patient responses:    Have you ever had a reaction to a flu vaccine? No  Do you have any current illness? No  Have you ever had [place] Syndrome? No  Do you have a serious allergy to any of the follow: Neomycin, Polymyxin, Thimerosal, eggs or egg products? No    Flu vaccine given per order. Please see immunization tab. Risks and benefits explained. Current VIS given.

## 2020-12-04 ENCOUNTER — HOSPITAL ENCOUNTER (OUTPATIENT)
Age: 80
Discharge: HOME | End: 2020-12-04
Payer: MEDICARE

## 2020-12-04 LAB
ALBUMIN SERPL-MCNC: 4.5 GM/DL (ref 3.4–5)
ALP BLD-CCNC: 76 IU/L (ref 40–129)
ALT SERPL-CCNC: 27 U/L (ref 10–40)
ANION GAP SERPL CALCULATED.3IONS-SCNC: 9 MMOL/L (ref 4–16)
AST SERPL-CCNC: 34 IU/L (ref 15–37)
BILIRUB SERPL-MCNC: 0.5 MG/DL (ref 0–1)
BUN BLDV-MCNC: 18 MG/DL (ref 6–23)
CALCIUM SERPL-MCNC: 9.6 MG/DL (ref 8.3–10.6)
CHLORIDE BLD-SCNC: 104 MMOL/L (ref 99–110)
CHOLESTEROL, FASTING: 144 MG/DL
CO2: 28 MMOL/L (ref 21–32)
CREAT SERPL-MCNC: 1 MG/DL (ref 0.6–1.1)
GFR AFRICAN AMERICAN: >60 ML/MIN/1.73M2
GFR NON-AFRICAN AMERICAN: 53 ML/MIN/1.73M2
GLUCOSE FASTING: 95 MG/DL (ref 70–99)
HDLC SERPL-MCNC: 79 MG/DL
LDL CHOLESTEROL DIRECT: 64 MG/DL
POTASSIUM SERPL-SCNC: 4.6 MMOL/L (ref 3.5–5.1)
SODIUM BLD-SCNC: 141 MMOL/L (ref 135–145)
TOTAL PROTEIN: 7.3 GM/DL (ref 6.4–8.2)
TRIGLYCERIDE, FASTING: 49 MG/DL
TSH HIGH SENSITIVITY: 1.54 UIU/ML (ref 0.27–4.2)

## 2020-12-04 PROCEDURE — 84443 ASSAY THYROID STIM HORMONE: CPT

## 2020-12-04 PROCEDURE — 36415 COLL VENOUS BLD VENIPUNCTURE: CPT

## 2020-12-04 PROCEDURE — 80053 COMPREHEN METABOLIC PANEL: CPT

## 2020-12-04 PROCEDURE — 80061 LIPID PANEL: CPT

## 2020-12-08 ENCOUNTER — OFFICE VISIT (OUTPATIENT)
Dept: INTERNAL MEDICINE CLINIC | Age: 80
End: 2020-12-08
Payer: MEDICARE

## 2020-12-08 VITALS
TEMPERATURE: 97.7 F | WEIGHT: 135.8 LBS | SYSTOLIC BLOOD PRESSURE: 120 MMHG | BODY MASS INDEX: 24.06 KG/M2 | OXYGEN SATURATION: 97 % | DIASTOLIC BLOOD PRESSURE: 62 MMHG | RESPIRATION RATE: 16 BRPM | HEART RATE: 64 BPM

## 2020-12-08 PROCEDURE — G8420 CALC BMI NORM PARAMETERS: HCPCS | Performed by: INTERNAL MEDICINE

## 2020-12-08 PROCEDURE — G8399 PT W/DXA RESULTS DOCUMENT: HCPCS | Performed by: INTERNAL MEDICINE

## 2020-12-08 PROCEDURE — 1090F PRES/ABSN URINE INCON ASSESS: CPT | Performed by: INTERNAL MEDICINE

## 2020-12-08 PROCEDURE — 1123F ACP DISCUSS/DSCN MKR DOCD: CPT | Performed by: INTERNAL MEDICINE

## 2020-12-08 PROCEDURE — G8427 DOCREV CUR MEDS BY ELIG CLIN: HCPCS | Performed by: INTERNAL MEDICINE

## 2020-12-08 PROCEDURE — 4040F PNEUMOC VAC/ADMIN/RCVD: CPT | Performed by: INTERNAL MEDICINE

## 2020-12-08 PROCEDURE — G8484 FLU IMMUNIZE NO ADMIN: HCPCS | Performed by: INTERNAL MEDICINE

## 2020-12-08 PROCEDURE — 99214 OFFICE O/P EST MOD 30 MIN: CPT | Performed by: INTERNAL MEDICINE

## 2020-12-08 PROCEDURE — 1036F TOBACCO NON-USER: CPT | Performed by: INTERNAL MEDICINE

## 2020-12-08 RX ORDER — ATORVASTATIN CALCIUM 10 MG/1
TABLET, FILM COATED ORAL
Qty: 90 TABLET | Refills: 1 | Status: SHIPPED | OUTPATIENT
Start: 2020-12-08 | End: 2021-05-25 | Stop reason: SDUPTHER

## 2020-12-08 RX ORDER — BENAZEPRIL HYDROCHLORIDE 20 MG/1
TABLET ORAL
Qty: 90 TABLET | Refills: 1 | Status: SHIPPED | OUTPATIENT
Start: 2020-12-08 | End: 2021-05-25 | Stop reason: SDUPTHER

## 2020-12-08 RX ORDER — TRIAMCINOLONE ACETONIDE 0.25 MG/G
CREAM TOPICAL PRN
COMMUNITY
Start: 2020-11-17 | End: 2021-01-19 | Stop reason: ALTCHOICE

## 2020-12-08 ASSESSMENT — ENCOUNTER SYMPTOMS
EYES NEGATIVE: 1
GASTROINTESTINAL NEGATIVE: 1
ALLERGIC/IMMUNOLOGIC NEGATIVE: 1
RESPIRATORY NEGATIVE: 1

## 2020-12-08 NOTE — PROGRESS NOTES
Yadira Sanchez  Patient's  is 1940  Seen in office on 2020      SUBJECTIVE:  Dave brink 78 y. o.year old female presents today   Chief Complaint   Patient presents with    3 Month Follow-Up    Results    Medication Refill     Pt is here for f/u of HTN , HLD and hypothyroidism   Patient has hypertension. Taking medications No headaches, no chest pain, no palpitations and no dizziness. Patient has hyperlipidemia. Taking medications. No abdominal pain, no nausea or vomiting. No myalgias. Pt has hypothyroidism and is on. Pt has labs done reviewed with patient  TSH is normal.    Taking medications regularly. No side effects noted. Review of Systems   Constitutional: Negative. HENT: Negative. Eyes: Negative. Respiratory: Negative. Cardiovascular: Negative. Gastrointestinal: Negative. Endocrine: Negative. Genitourinary: Negative. Musculoskeletal: Negative. Skin: Negative. Allergic/Immunologic: Negative. Neurological: Negative. Hematological: Negative. Psychiatric/Behavioral: Negative. OBJECTIVE: /62   Pulse 64   Temp 97.7 °F (36.5 °C) (Oral)   Resp 16   Wt 135 lb 12.8 oz (61.6 kg)   SpO2 97%   BMI 24.06 kg/m²     Wt Readings from Last 3 Encounters:   20 135 lb 12.8 oz (61.6 kg)   20 132 lb 6.4 oz (60.1 kg)   20 135 lb 3.2 oz (61.3 kg)        Patient was seen taking COVID-19 precautions. Face mask, gloves and eyes protectors were used. Patient also wore facemask. GENERAL: - Alert, oriented, pleasant, in no apparent distress. HEENT: - Conjunctiva pink, no scleral icterus. ENT clear. NECK: -Supple. No jugular venous distention noted. No masses felt,  CARDIOVASCULAR: - Normal S1 and S2    PULMONARY: - No respiratory distress. No wheezes or rales. ABDOMEN: - Soft and non-tender,no masses  ororganomegaly. EXTREMITIES: - No cyanosis, clubbing, or significant edema. SKIN: Skin is warm and dry.    NEUROLOGICAL: - Cranial nerves II through XII are grossly intact. IMPRESSION:    Encounter Diagnoses   Name Primary?  Acquired hypothyroidism     Essential hypertension     Gastroesophageal reflux disease without esophagitis     Heart palpitations     Liver dysfunction     Mixed hyperlipidemia     PVCs (premature ventricular contractions)     Ventricular tachycardia (HCC)        ASSESSMENT/PLAN:    Acquired hypothyroidism  Pt is taking synthroid 50 mcg daily  TSH is good. Patient is stable. Continue current treatment. Essential hypertension  Patient has HTN   Patient is on benazepril 20 mg daily  Patient is stable. Continue current treatment. Gastroesophageal reflux disease without esophagitis  Pt had EGD done in 11/17 : stopped taking PPI  Doing well with out it    Heart palpitations  Pt has PAC and PVC in the past.   30 event monitor : nothing significant. Has cardiology appt. Liver dysfunction  liver bx 10/09,mild steaotosis  Liver function much better     Mixed hyperlipidemia  Lipid profile is good. Hyperlipidemia is stable. Follow low cholesterol diet. Continue current treatment. PVCs (premature ventricular contractions)  Pt is doing well. No palpiatation  Pt states she is going to see Dr Ana Meier     Ventricular tachycardia [facility])  As above     Return to office in 3 months. Mediations reviewed with the patient. Continue current medications. Appropriate prescriptions are addressed. After visit summeryprovided. Follow up as directed sooner if needed. Questions answered and patient verbalizes understanding. Call for any problems, questions, or concerns. Allergies   Allergen Reactions    Amiodarone      Severe hair loss    Amoxicillin Other (See Comments)     Pt states she got C-diff. , so she doesn't like atb     Current Outpatient Medications   Medication Sig Dispense Refill    triamcinolone (KENALOG) 0.025 % cream as needed rash      levothyroxine (SYNTHROID) 50 MCG tablet TAKE 1 TABLET BY MOUTH DAILY 90 tablet 1    atorvastatin (LIPITOR) 10 MG tablet TAKE 1 TABLET EVERY DAY 90 tablet 1    benazepril (LOTENSIN) 20 MG tablet TAKE 1 TABLET BY MOUTH DAILY 90 tablet 1    aspirin 81 MG tablet Take 81 mg by mouth daily Take 2 tabs by mouth in am and one tab at hs      Magnesium 125 MG CAPS Take by mouth daily      lactobacillus (CULTURELLE) capsule Take 1 capsule by mouth 2 times daily (with meals) 30 capsule 0    Coenzyme Q10 (CO Q 10) 100 MG CAPS Take by mouth daily       Cholecalciferol (VITAMIN D3) 2000 UNITS CAPS Take 1 capsule by mouth daily       Multiple Vitamins-Minerals (MULTIVITAMIN & MINERAL PO) Take 1 tablet by mouth daily      calcium carbonate (OSCAL) 500 MG TABS tablet Take 500 mg by mouth daily      zoster recombinant adjuvanted vaccine (SHINGRIX) 50 MCG/0.5ML SUSR injection 50 MCG IM then repeat 2-6 months. 0.5 mL 1     No current facility-administered medications for this visit. Past Medical History:   Diagnosis Date    Acute deep vein thrombosis (DVT) of distal vein of left lower extremity ([address] 10/23/2016    DVT involving left peroneal, posterior and anterior tibial veins 10/16 Treated for at least 3 months and repeat venous doppler was negative and was discontinued     Axillary adenopathy 8/28/2014    US of axilla 6/14 showed benign lymph nodes    C. difficile diarrhea 11/18/2016    Treated few times and now is normal    CAD (coronary artery disease)     Chest discomfort 8/1/2017    Colitis 12/13    EGD,COLONOSCOPY, SBFT normal. Dr. Dalia Mcdonough    Cyst of right ovary 9/6/2016    Seen Dr Cyndi Joy and had several US per pt.  Dizzy spells 6/5/2017    H/O 24 hour EKG monitoring 7/27/09    NSR, few PVCs and occasional couplet noted (total 1007 PVCs), no pauses noted.  H/O colonoscopy 10/09, 12/13    f/u in 5 years    H/O echocardiogram 9/17/12    Normal LV size and function, mild mitral and tricuspid regurg., EF 60%.     H/O mitral valve prolapse 2003    MR mild    H/O myocardial perfusion scan 12/16/14    Stress Cardiolite. Normal perfusion in the distribution of all coronaries, normal LV size and function, EF 70%.  H/O screening mammography 8/11    Dr Ron Jang History of cardiac monitoring 06/05/2017    Abnormal-SR with complex ventricular arrhythmias and frequent PVCs. No symptoms during marked bradycardia or during the fastest rate of 143 with nonsustained three-beat runs of ventricular tachycardia.  History of cardiac monitoring 08/23/2017    Abnormal event monitor findings suggestive of predominantly sinus bradycardia with isolated and frequent low-grade ventricular ectopy with symptoms reported.  Hx of Doppler ultrasound 10/17/2017    duplex venous doppler-minimal reflux noted in the left CFV    Hyperlipidemia     Hypertension     Hypothyroidism     Liver dysfunction     liver bx 10/09,mild steaotosis    Osteoarthritis of cervical spine 6/7/2016    Ovarian cyst     Papanicolaou smear 09/14/2016    Dr. Ron Jang PVCs (premature ventricular contractions)     symptomatic frequent PVCs, sees Dr. Natalio Cabral Ventricular tachycardia ([address] 8/1/2017    Exercise induced 8/1/17     Past Surgical History:   Procedure Laterality Date    APPENDECTOMY      BREAST BIOPSY      CATARACT REMOVAL Bilateral     COLONOSCOPY  12/3/13,2-28-17    2.28.17 no need for follow up due to pt's advanced age per Dr. Pamela Chavez.  DIAGNOSTIC CARDIAC CATH LAB PROCEDURE  08/03/2017    Normal coronaries, EF 70%.  HYSTERECTOMY  1961    LIVER BIOPSY  10/09    steasosis    UPPER GASTROINTESTINAL ENDOSCOPY  11/02/2017    Dr. Hailey Paiz History     Tobacco Use    Smoking status: Never Smoker    Smokeless tobacco: Never Used   Substance Use Topics    Alcohol use:  Yes     Alcohol/week: 0.0 standard drinks     Comment:  2 beers per day or 2 glasses of \"bubbly\"/day       LAB REVIEW:  CBC:   Lab Results   Component Value Date    WBC 7.1 05/19/2020    HGB 13.3 05/19/2020    HCT 39.2 05/19/2020     05/19/2020     Lipids:   Lab Results   Component Value Date    CHOL 137 02/11/2017    TRIG 62 02/11/2017    HDL 79 12/04/2020    LDLCALC 89 05/19/2020    LDLDIRECT 64 12/04/2020    TRIGLYCFAST 49 12/04/2020     Renal:   Lab Results   Component Value Date    BUN 18 12/04/2020    CREATININE 1.0 12/04/2020     12/04/2020    K 4.6 12/04/2020    ALT 27 12/04/2020    AST 34 12/04/2020    GLUCOSE 96 05/19/2020     PT/INR:   Lab Results   Component Value Date    INR 0.91 10/17/2017     A1C:   Lab Results   Component Value Date    LABA1C 5.5 11/13/2019           Marilyn Duron MD, 12/8/2020 , 10:21 AM

## 2020-12-08 NOTE — ASSESSMENT & PLAN NOTE
Patient has HTN   Patient is on benazepril 20 mg daily  Patient is stable. Continue current treatment.

## 2021-01-19 ENCOUNTER — OFFICE VISIT (OUTPATIENT)
Dept: INTERNAL MEDICINE CLINIC | Age: 81
End: 2021-01-19
Payer: MEDICARE

## 2021-01-19 VITALS
OXYGEN SATURATION: 99 % | WEIGHT: 138.2 LBS | SYSTOLIC BLOOD PRESSURE: 130 MMHG | TEMPERATURE: 98.3 F | BODY MASS INDEX: 24.48 KG/M2 | DIASTOLIC BLOOD PRESSURE: 78 MMHG | HEART RATE: 62 BPM

## 2021-01-19 DIAGNOSIS — R42 DIZZINESS: Primary | ICD-10-CM

## 2021-01-19 DIAGNOSIS — I47.20 VENTRICULAR TACHYCARDIA: ICD-10-CM

## 2021-01-19 DIAGNOSIS — I49.3 PVCS (PREMATURE VENTRICULAR CONTRACTIONS): ICD-10-CM

## 2021-01-19 PROCEDURE — G8399 PT W/DXA RESULTS DOCUMENT: HCPCS | Performed by: INTERNAL MEDICINE

## 2021-01-19 PROCEDURE — 1123F ACP DISCUSS/DSCN MKR DOCD: CPT | Performed by: INTERNAL MEDICINE

## 2021-01-19 PROCEDURE — G8420 CALC BMI NORM PARAMETERS: HCPCS | Performed by: INTERNAL MEDICINE

## 2021-01-19 PROCEDURE — 4040F PNEUMOC VAC/ADMIN/RCVD: CPT | Performed by: INTERNAL MEDICINE

## 2021-01-19 PROCEDURE — 99213 OFFICE O/P EST LOW 20 MIN: CPT | Performed by: INTERNAL MEDICINE

## 2021-01-19 PROCEDURE — G8484 FLU IMMUNIZE NO ADMIN: HCPCS | Performed by: INTERNAL MEDICINE

## 2021-01-19 PROCEDURE — G8427 DOCREV CUR MEDS BY ELIG CLIN: HCPCS | Performed by: INTERNAL MEDICINE

## 2021-01-19 PROCEDURE — 1036F TOBACCO NON-USER: CPT | Performed by: INTERNAL MEDICINE

## 2021-01-19 PROCEDURE — 1090F PRES/ABSN URINE INCON ASSESS: CPT | Performed by: INTERNAL MEDICINE

## 2021-01-19 ASSESSMENT — PATIENT HEALTH QUESTIONNAIRE - PHQ9
SUM OF ALL RESPONSES TO PHQ9 QUESTIONS 1 & 2: 0
SUM OF ALL RESPONSES TO PHQ QUESTIONS 1-9: 0
2. FEELING DOWN, DEPRESSED OR HOPELESS: 0

## 2021-01-19 NOTE — PROGRESS NOTES
Jeevan Edwards  Patient's  is 1940  Seen in office on 2021      SUBJECTIVE:  Liz Lenz keena 78 y. o.year old female presents today   Chief Complaint   Patient presents with    Dizziness     x 1 week off and on    Pain     left calf pain, x 2 weeks     Pt is c/o of dizziness when she bends over for about 10 days. No HA. No NV  Left side of chest achy and left shoulder pain. Labs is normal.   No orthostatic drop  Pt has h/o cardiac arrhythmias and PVCs   Taking medications regularly. No side effects noted. Review of Systems    OBJECTIVE: /78 (Site: Left Upper Arm, Position: Standing, Cuff Size: Medium Adult)   Pulse 62   Temp 98.3 °F (36.8 °C)   Wt 138 lb 3.2 oz (62.7 kg)   SpO2 99%   BMI 24.48 kg/m²     Wt Readings from Last 3 Encounters:   21 138 lb 3.2 oz (62.7 kg)   20 135 lb 12.8 oz (61.6 kg)   20 132 lb 6.4 oz (60.1 kg)        Patient was seen taking COVID-19 precautions. Face mask, gloves and eyes protectors were used. Patient also wore facemask. GENERAL: - Alert, oriented, pleasant, in no apparent distress. HEENT: - Conjunctiva pink, no scleral icterus. ENT clear. NECK: -Supple. No jugular venous distention noted. No masses felt,  CARDIOVASCULAR: - Normal S1 and S2    PULMONARY: - No respiratory distress. No wheezes or rales. ABDOMEN: - Soft and non-tender,no masses  ororganomegaly. EXTREMITIES: - No cyanosis, clubbing, or significant edema. SKIN: Skin is warm and dry. NEUROLOGICAL: - Cranial nerves II through XII are grossly intact. No nystagmus    IMPRESSION:    Encounter Diagnoses   Name Primary?  Dizziness Yes    PVCs (premature ventricular contractions)     Ventricular tachycardia (HCC)        ASSESSMENT/PLAN:    Pt is c/o dizziness off and on for a week to 10 days   neuroexam is normal.   H/o cardiac arrhythmias   Will refer to Dr Di Hilton in 1 month      Mediations reviewed with the patient. Continue current medications. Appropriate prescriptions are addressed. After visit summeryprovided. Follow up as directed sooner if needed. Questions answered and patient verbalizes understanding. Call for any problems, questions, or concerns. Allergies   Allergen Reactions    Amiodarone      Severe hair loss    Amoxicillin Other (See Comments)     Pt states she got C-diff. , so she doesn't like atb     Current Outpatient Medications   Medication Sig Dispense Refill    atorvastatin (LIPITOR) 10 MG tablet TAKE 1 TABLET EVERY DAY 90 tablet 1    benazepril (LOTENSIN) 20 MG tablet TAKE 1 TABLET BY MOUTH DAILY 90 tablet 1    levothyroxine (SYNTHROID) 50 MCG tablet TAKE 1 TABLET BY MOUTH DAILY 90 tablet 1    aspirin 81 MG tablet Take 81 mg by mouth daily Take 2 tabs by mouth in am and one tab at hs      Magnesium 125 MG CAPS Take by mouth daily      lactobacillus (CULTURELLE) capsule Take 1 capsule by mouth 2 times daily (with meals) 30 capsule 0    Coenzyme Q10 (CO Q 10) 100 MG CAPS Take by mouth daily       Cholecalciferol (VITAMIN D3) 2000 UNITS CAPS Take 1 capsule by mouth daily       Multiple Vitamins-Minerals (MULTIVITAMIN & MINERAL PO) Take 1 tablet by mouth daily      calcium carbonate (OSCAL) 500 MG TABS tablet Take 500 mg by mouth daily      zoster recombinant adjuvanted vaccine (SHINGRIX) 50 MCG/0.5ML SUSR injection 50 MCG IM then repeat 2-6 months. 0.5 mL 1     No current facility-administered medications for this visit.       Past Medical History:   Diagnosis Date    Acute deep vein thrombosis (DVT) of distal vein of left lower extremity ([address] 10/23/2016    DVT involving left peroneal, posterior and anterior tibial veins 10/16 Treated for at least 3 months and repeat venous doppler was negative and was discontinued     Axillary adenopathy 8/28/2014    US of axilla 6/14 showed benign lymph nodes    C. difficile diarrhea 11/18/2016    Treated few times and now is normal    CAD (coronary artery disease)     Chest discomfort 8/1/2017    Colitis 12/13    EGD,COLONOSCOPY, SBFT normal. Dr. Darryl Cordova    Cyst of right ovary 9/6/2016    Seen Dr Svetlana Schmitt and had several US per pt.  Dizzy spells 6/5/2017    H/O 24 hour EKG monitoring 7/27/09    NSR, few PVCs and occasional couplet noted (total 1007 PVCs), no pauses noted.  H/O colonoscopy 10/09, 12/13    f/u in 5 years    H/O echocardiogram 9/17/12    Normal LV size and function, mild mitral and tricuspid regurg., EF 60%.  H/O mitral valve prolapse 2003    MR mild    H/O myocardial perfusion scan 12/16/14    Stress Cardiolite. Normal perfusion in the distribution of all coronaries, normal LV size and function, EF 70%.  H/O screening mammography 8/11    Dr Janna Wise History of cardiac monitoring 06/05/2017    Abnormal-SR with complex ventricular arrhythmias and frequent PVCs. No symptoms during marked bradycardia or during the fastest rate of 143 with nonsustained three-beat runs of ventricular tachycardia.  History of cardiac monitoring 08/23/2017    Abnormal event monitor findings suggestive of predominantly sinus bradycardia with isolated and frequent low-grade ventricular ectopy with symptoms reported.  Hx of Doppler ultrasound 10/17/2017    duplex venous doppler-minimal reflux noted in the left CFV    Hyperlipidemia     Hypertension     Hypothyroidism     Liver dysfunction     liver bx 10/09,mild steaotosis    Osteoarthritis of cervical spine 6/7/2016    Ovarian cyst     Papanicolaou smear 09/14/2016    Dr. Janna Wise PVCs (premature ventricular contractions)     symptomatic frequent PVCs, sees Dr. Marycarmen Quick Ventricular tachycardia ([facility] [address] 8/1/2017    Exercise induced 8/1/17     Past Surgical History:   Procedure Laterality Date    APPENDECTOMY      BREAST BIOPSY      CATARACT REMOVAL Bilateral     COLONOSCOPY  12/3/13,2-28-17 2.28.17 no need for follow up due to pt's advanced age per Dr. Nunu Laughlin.     DIAGNOSTIC CARDIAC CATH LAB PROCEDURE  08/03/2017 Normal coronaries, EF 70%.  HYSTERECTOMY  1961    LIVER BIOPSY  10/09    steasosis    UPPER GASTROINTESTINAL ENDOSCOPY  11/02/2017    Dr. Ling Lyons History     Tobacco Use    Smoking status: Never Smoker    Smokeless tobacco: Never Used   Substance Use Topics    Alcohol use:  Yes     Alcohol/week: 0.0 standard drinks     Comment:  2 beers per day or 2 glasses of \"bubbly\"/day       LAB REVIEW:  CBC:   Lab Results   Component Value Date    WBC 7.1 05/19/2020    HGB 13.3 05/19/2020    HCT 39.2 05/19/2020     05/19/2020     Lipids:   Lab Results   Component Value Date    CHOL 137 02/11/2017    TRIG 62 02/11/2017    HDL 79 12/04/2020    LDLCALC 89 05/19/2020    LDLDIRECT 64 12/04/2020    TRIGLYCFAST 49 12/04/2020     Renal:   Lab Results   Component Value Date    BUN 18 12/04/2020    CREATININE 1.0 12/04/2020     12/04/2020    K 4.6 12/04/2020    ALT 27 12/04/2020    AST 34 12/04/2020    GLUCOSE 96 05/19/2020     PT/INR:   Lab Results   Component Value Date    INR 0.91 10/17/2017     A1C:   Lab Results   Component Value Date    LABA1C 5.5 11/13/2019           Radha Martini MD, 1/19/2021 , 3:21 PM

## 2021-02-26 ENCOUNTER — NURSE ONLY (OUTPATIENT)
Dept: CARDIOLOGY CLINIC | Age: 81
End: 2021-02-26
Payer: MEDICARE

## 2021-02-26 ENCOUNTER — OFFICE VISIT (OUTPATIENT)
Dept: CARDIOLOGY CLINIC | Age: 81
End: 2021-02-26
Payer: MEDICARE

## 2021-02-26 VITALS
WEIGHT: 138.2 LBS | HEART RATE: 64 BPM | HEIGHT: 63 IN | BODY MASS INDEX: 24.49 KG/M2 | SYSTOLIC BLOOD PRESSURE: 136 MMHG | DIASTOLIC BLOOD PRESSURE: 70 MMHG

## 2021-02-26 DIAGNOSIS — R00.2 PALPITATIONS: Primary | ICD-10-CM

## 2021-02-26 PROCEDURE — 1123F ACP DISCUSS/DSCN MKR DOCD: CPT | Performed by: INTERNAL MEDICINE

## 2021-02-26 PROCEDURE — G8484 FLU IMMUNIZE NO ADMIN: HCPCS | Performed by: INTERNAL MEDICINE

## 2021-02-26 PROCEDURE — 1090F PRES/ABSN URINE INCON ASSESS: CPT | Performed by: INTERNAL MEDICINE

## 2021-02-26 PROCEDURE — G8427 DOCREV CUR MEDS BY ELIG CLIN: HCPCS | Performed by: INTERNAL MEDICINE

## 2021-02-26 PROCEDURE — 4040F PNEUMOC VAC/ADMIN/RCVD: CPT | Performed by: INTERNAL MEDICINE

## 2021-02-26 PROCEDURE — 93228 REMOTE 30 DAY ECG REV/REPORT: CPT | Performed by: INTERNAL MEDICINE

## 2021-02-26 PROCEDURE — G8399 PT W/DXA RESULTS DOCUMENT: HCPCS | Performed by: INTERNAL MEDICINE

## 2021-02-26 PROCEDURE — 99214 OFFICE O/P EST MOD 30 MIN: CPT | Performed by: INTERNAL MEDICINE

## 2021-02-26 PROCEDURE — 1036F TOBACCO NON-USER: CPT | Performed by: INTERNAL MEDICINE

## 2021-02-26 PROCEDURE — G8420 CALC BMI NORM PARAMETERS: HCPCS | Performed by: INTERNAL MEDICINE

## 2021-02-26 PROCEDURE — 93000 ELECTROCARDIOGRAM COMPLETE: CPT | Performed by: INTERNAL MEDICINE

## 2021-02-26 NOTE — PROGRESS NOTES
Hernandez Pham  is a  Established patient  ,78 y.o.   female here for evaluation of the following chief complaint(s):    htn        SUBJECTIVE/OBJECTIVE:  HPI : h/o  Htn, hyperlipidimea, PVCs now here  Has no cardiac complains, has seen Dr Marina Francis for poss ablation but declined    Review of Systems    dizzy    Vitals:    02/26/21 0845   BP: 136/70   Pulse: 64   Weight: 138 lb 3.2 oz (62.7 kg)   Height: 5' 3\" (1.6 m)     /70   Pulse 64   Ht 5' 3\" (1.6 m)   Wt 138 lb 3.2 oz (62.7 kg)   BMI 24.48 kg/m²   No flowsheet data found. Wt Readings from Last 3 Encounters:   02/26/21 138 lb 3.2 oz (62.7 kg)   01/19/21 138 lb 3.2 oz (62.7 kg)   12/08/20 135 lb 12.8 oz (61.6 kg)     Body mass index is 24.48 kg/m². Physical Exam     Neck: JVD      Lungs : clear    Cardio : Si and S2 audilble      Ext: edema      All pertinent data reviewed      Meds : reviewed         Tests ordered        ASSESSMENT/PLAN:    - freq PVCs  Need to see EP Dw pt, holter   And echo    -  Hypertension: Patients blood pressure is normal. Patient is advised about low sodium diet. Present medical regimen will not be changed. - arrythmias: stable         -  LIPID MANAGEMENT:  Importance of lipid levels discussed with patient   and patient was given dietary advice. NCEP- ATP III guidelines reviewed with patient. -   Changes  in medicines made: No                                    An electronic signature was used to authenticate this note.     --Eva Wilson MD

## 2021-03-06 ENCOUNTER — PROCEDURE VISIT (OUTPATIENT)
Dept: CARDIOLOGY CLINIC | Age: 81
End: 2021-03-06
Payer: MEDICARE

## 2021-03-06 DIAGNOSIS — R00.2 PALPITATIONS: ICD-10-CM

## 2021-03-06 LAB
LV EF: 58 %
LVEF MODALITY: NORMAL

## 2021-03-06 PROCEDURE — 93306 TTE W/DOPPLER COMPLETE: CPT | Performed by: INTERNAL MEDICINE

## 2021-03-08 DIAGNOSIS — R00.2 PALPITATIONS: ICD-10-CM

## 2021-03-09 ENCOUNTER — OFFICE VISIT (OUTPATIENT)
Dept: INTERNAL MEDICINE CLINIC | Age: 81
End: 2021-03-09
Payer: MEDICARE

## 2021-03-09 VITALS
WEIGHT: 136.6 LBS | RESPIRATION RATE: 16 BRPM | SYSTOLIC BLOOD PRESSURE: 122 MMHG | TEMPERATURE: 98.2 F | DIASTOLIC BLOOD PRESSURE: 68 MMHG | OXYGEN SATURATION: 98 % | HEART RATE: 68 BPM | BODY MASS INDEX: 24.2 KG/M2

## 2021-03-09 DIAGNOSIS — R00.2 HEART PALPITATIONS: ICD-10-CM

## 2021-03-09 DIAGNOSIS — K76.89 LIVER DYSFUNCTION: ICD-10-CM

## 2021-03-09 DIAGNOSIS — I49.3 PVCS (PREMATURE VENTRICULAR CONTRACTIONS): ICD-10-CM

## 2021-03-09 DIAGNOSIS — K21.9 GASTROESOPHAGEAL REFLUX DISEASE WITHOUT ESOPHAGITIS: ICD-10-CM

## 2021-03-09 DIAGNOSIS — E78.2 MIXED HYPERLIPIDEMIA: ICD-10-CM

## 2021-03-09 DIAGNOSIS — K52.9 COLITIS: ICD-10-CM

## 2021-03-09 DIAGNOSIS — E03.9 ACQUIRED HYPOTHYROIDISM: ICD-10-CM

## 2021-03-09 DIAGNOSIS — I10 ESSENTIAL HYPERTENSION: ICD-10-CM

## 2021-03-09 PROCEDURE — 1090F PRES/ABSN URINE INCON ASSESS: CPT | Performed by: INTERNAL MEDICINE

## 2021-03-09 PROCEDURE — 99214 OFFICE O/P EST MOD 30 MIN: CPT | Performed by: INTERNAL MEDICINE

## 2021-03-09 PROCEDURE — 1036F TOBACCO NON-USER: CPT | Performed by: INTERNAL MEDICINE

## 2021-03-09 PROCEDURE — G8484 FLU IMMUNIZE NO ADMIN: HCPCS | Performed by: INTERNAL MEDICINE

## 2021-03-09 PROCEDURE — 1123F ACP DISCUSS/DSCN MKR DOCD: CPT | Performed by: INTERNAL MEDICINE

## 2021-03-09 PROCEDURE — G8420 CALC BMI NORM PARAMETERS: HCPCS | Performed by: INTERNAL MEDICINE

## 2021-03-09 PROCEDURE — 4040F PNEUMOC VAC/ADMIN/RCVD: CPT | Performed by: INTERNAL MEDICINE

## 2021-03-09 PROCEDURE — G8427 DOCREV CUR MEDS BY ELIG CLIN: HCPCS | Performed by: INTERNAL MEDICINE

## 2021-03-09 PROCEDURE — G8399 PT W/DXA RESULTS DOCUMENT: HCPCS | Performed by: INTERNAL MEDICINE

## 2021-03-09 RX ORDER — LEVOTHYROXINE SODIUM 0.05 MG/1
TABLET ORAL
Qty: 90 TABLET | Refills: 1 | Status: SHIPPED | OUTPATIENT
Start: 2021-03-09 | End: 2021-08-25 | Stop reason: SDUPTHER

## 2021-03-09 ASSESSMENT — ENCOUNTER SYMPTOMS
ALLERGIC/IMMUNOLOGIC NEGATIVE: 1
EYES NEGATIVE: 1
WHEEZING: 0
SHORTNESS OF BREATH: 0
RESPIRATORY NEGATIVE: 1
COUGH: 0
GASTROINTESTINAL NEGATIVE: 1

## 2021-03-09 NOTE — PROGRESS NOTES
Lona Shipley  Patient's  is 1940  Seen in office on 3/9/2021      SUBJECTIVE:  Valla Burkitt isa 78 y. o.year old female presents today   Chief Complaint   Patient presents with    3 Month Follow-Up    Other     sees Dr. Peter Gonzalez Medication Refill     Pt is here for f/u of dizziness   She was sent to Dr Netty Sacks and he ordered event monitor for 7 days   Echo  moderate MR and normal EF  Pt states she felt weak yesterday and now is doing well. No chest pain. No SOB  No HA    Pt has f/u appt with Dr Netty Sacks    Patient has hypertension. Taking medications No headaches, no chest pain, no palpitations and no dizziness. Patient has hyperlipidemia. Taking medications. No abdominal pain, no nausea or vomiting. No myalgias. Pt has hypothyroidism    Pt has both covid vaccine done. Taking medications regularly. No side effects noted. Review of Systems   Constitutional: Negative. HENT: Negative. Eyes: Negative. Respiratory: Negative. Negative for cough, shortness of breath and wheezing. Cardiovascular: Negative. Gastrointestinal: Negative. Endocrine: Negative. Genitourinary: Negative. Musculoskeletal: Negative. Skin: Negative. Allergic/Immunologic: Negative. Neurological: Negative. Hematological: Negative. Psychiatric/Behavioral: Negative. OBJECTIVE: /68   Pulse 68 Comment: irreg  Temp 98.2 °F (36.8 °C) (Oral)   Resp 16   Wt 136 lb 9.6 oz (62 kg)   SpO2 98%   BMI 24.20 kg/m²     Wt Readings from Last 3 Encounters:   21 136 lb 9.6 oz (62 kg)   21 138 lb 3.2 oz (62.7 kg)   21 138 lb 3.2 oz (62.7 kg)        Patient was seen taking COVID-19 precautions. Face mask, gloves and eyes protectors were used. Patient also wore facemask. GENERAL: - Alert, oriented, pleasant, in no apparent distress. HEENT: - Conjunctiva pink, no scleral icterus. ENT clear. NECK: -Supple. No jugular venous distention noted.  No masses felt,  CARDIOVASCULAR: - Normal S1 and S2    PULMONARY: - No respiratory distress. No wheezes or rales. ABDOMEN: - Soft and non-tender,no masses  ororganomegaly. EXTREMITIES: - No cyanosis, clubbing, or significant edema. SKIN: Skin is warm and dry. NEUROLOGICAL: - Cranial nerves II through XII are grossly intact. IMPRESSION:    Encounter Diagnoses   Name Primary?  Acquired hypothyroidism     Colitis     Essential hypertension     Gastroesophageal reflux disease without esophagitis     Heart palpitations     Liver dysfunction     Mixed hyperlipidemia     PVCs (premature ventricular contractions)        ASSESSMENT/PLAN:    Acquired hypothyroidism  Patient has history of hypothyroidism. Continue Synthroid 50 mcg daily    Colitis  Colitis has resolved    Essential hypertension  Hypertension controlled. Continue Benzapril 20 mg daily. Gastroesophageal reflux disease without esophagitis  GERD symptoms are in control without any medications. Follow antireflux measures    Heart palpitations  Patient had heart palpitations and has a history of PVCs and PACs. She had declined ablation in the past.  Has seen Dr. Trina Short and he is following patient    Liver dysfunction  Patient history of mild steatosis. ALT AST in December 2020 were  normal    Mixed hyperlipidemia  Last lipid profile was good in December 2020. Continue atorvastatin    PVCs (premature ventricular contractions)  Patient has a history of PVCs and has seen EP in the past she was having dizziness. Was referred to Dr. Brigette Almendarez. He wants patient to see Dr. Girma Shukla    Return to office in 3 months. Mediations reviewed with the patient. Continue current medications. Appropriate prescriptions are addressed. After visit summeryprovided. Follow up as directed sooner if needed. Questions answered and patient verbalizes understanding. Call for any problems, questions, or concerns.        Allergies   Allergen Reactions    Amiodarone      Severe hair loss    Amoxicillin Other (See Comments)     Pt states she got C-diff. , so she doesn't like atb     Current Outpatient Medications   Medication Sig Dispense Refill    atorvastatin (LIPITOR) 10 MG tablet TAKE 1 TABLET EVERY DAY 90 tablet 1    benazepril (LOTENSIN) 20 MG tablet TAKE 1 TABLET BY MOUTH DAILY 90 tablet 1    levothyroxine (SYNTHROID) 50 MCG tablet TAKE 1 TABLET BY MOUTH DAILY 90 tablet 1    aspirin 81 MG tablet Take 81 mg by mouth daily Take 2 tabs by mouth in am and one tab at hs      Magnesium 125 MG CAPS Take by mouth daily      lactobacillus (CULTURELLE) capsule Take 1 capsule by mouth 2 times daily (with meals) 30 capsule 0    Coenzyme Q10 (CO Q 10) 100 MG CAPS Take by mouth daily       Cholecalciferol (VITAMIN D3) 2000 UNITS CAPS Take 1 capsule by mouth daily       Multiple Vitamins-Minerals (MULTIVITAMIN & MINERAL PO) Take 1 tablet by mouth daily      calcium carbonate (OSCAL) 500 MG TABS tablet Take 500 mg by mouth daily      zoster recombinant adjuvanted vaccine (SHINGRIX) 50 MCG/0.5ML SUSR injection 50 MCG IM then repeat 2-6 months. (Patient not taking: Reported on 2/26/2021) 0.5 mL 1     No current facility-administered medications for this visit. Social History     Tobacco Use    Smoking status: Never Smoker    Smokeless tobacco: Never Used   Substance Use Topics    Alcohol use:  Yes     Alcohol/week: 0.0 standard drinks     Comment:  2 beers per day or 2 glasses of \"bubbly\"/day       LAB REVIEW:  CBC:   Lab Results   Component Value Date    WBC 7.1 05/19/2020    HGB 13.3 05/19/2020    HCT 39.2 05/19/2020     05/19/2020     Lipids:   Lab Results   Component Value Date    HDL 79 12/04/2020    LDLCALC 89 05/19/2020    LDLDIRECT 64 12/04/2020    TRIGLYCFAST 49 12/04/2020    CHOLFAST 144 12/04/2020     Renal:   Lab Results   Component Value Date    BUN 18 12/04/2020    CREATININE 1.0 12/04/2020     12/04/2020    K 4.6 12/04/2020    ALT 27 12/04/2020    AST 34 12/04/2020    GLUCOSE 96 05/19/2020    GLUF 95 12/04/2020     PT/INR:   Lab Results   Component Value Date    INR 0.91 10/17/2017     A1C:   Lab Results   Component Value Date    LABA1C 5.5 11/13/2019           Fernandez Ponce MD, 3/9/2021 , 9:23 AM

## 2021-03-09 NOTE — ASSESSMENT & PLAN NOTE
Patient had heart palpitations and has a history of PVCs and PACs.   She had declined ablation in the past.  Has seen Dr. Sal Case and he is following patient

## 2021-03-09 NOTE — ASSESSMENT & PLAN NOTE
Patient has a history of PVCs and has seen EP in the past she was having dizziness. Was referred to Dr. Jaspreet Trent.   He wants patient to see Dr. America Vidal

## 2021-04-16 NOTE — ASSESSMENT & PLAN NOTE
4/16/2021         RE: Lakeshia Lambert  [address] [address]        Dear Colleague,    Thank you for referring your patient, Lakeshia Lambert, to the [facility] [place]. Please see a copy of my visit note below.    Pediatric Endocrinology Follow-up Consultation    Patient: Lakeshia Lambert MRN# [id number]   YOB: 2009 Age: 11 year old   Date of Visit: Apr 16, 2021    Dear [facility] Provider:    I had the pleasure of seeing your patient, Lakeshia Lambert in the Pediatric Endocrinology Clinic, [facility] on Apr 16, 2021 for a follow-up consultation of short stature related to SGA.           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  Lakeshia was last seen in clinic virtually on 12/11/2020.    Prior history is reviewed:  Lakeshia has a history of being small for gestational age with growth that continued to be below the curve throughout infancy and toddlerhood without any significant catch up growth. Lakeshia started on growth hormone 7/28/12.  Onset of body odor was noted 9/2014.  Onset of breast development was noted 3/2015 (just prior to 6th birthday).  She underwent menarche 11/2017.     Present history:  Lakeshia has remained healthy since her last clinic visit.  Lakeshia used up her supply of growth hormone 2 weeks ago.  Had been on Omnitrope at 3.8 mg daily (0.46 mg/kg/week).  She reports no changes since discontinuation of treatment.    Lakeshia denies signs symptoms of hypothyroidism-reports normal sleep, normal energy, no changes to skin, no constipation, diarrhea, or  Pt had EGD done in 11/17 : stopped taking PPI  Doing well with out it "abdominal pain. She denies menstrual irregularities.  No severe headaches, knee, or hip pain.      History was obtained from patient, patient's mother, and electronic medical record.       Social History:     Social History     Social History Narrative    Lakeshia lives with parents and younger brother (3 years younger).  Lakeshia is in 8th grade fall 2020.  She participates in cheer and nordic skiing.           Social history was reviewed and as above.           Family History:     Family History   Problem Relation Age of Onset     Asthma Father      Allergies Father      Obesity Maternal Grandmother      Hypertension Maternal Grandfather      Diabetes Paternal Grandmother         Type 2 DM     Cancer - colorectal Paternal Grandmother      Arthritis Paternal Grandmother      Hypertension Paternal Grandfather        Family history was reviewed and is unchanged.  Mom underwent menarche at age 10.           Allergies:   No Known Allergies          Medications:     Current Outpatient Medications   Medication Sig Dispense Refill     insulin pen needle (31G X 5 MM) 31G X 5 MM miscellaneous Use 1-2 pen needles daily or as directed. 100 each 11     somatropin (NORDITROPIN FLEXPRO) 30 MG/3ML SOLN Inject 3.8 mg Subcutaneous daily 12 mL 5             Review of Systems:   Gen: Negative  Eye: Negative  ENT: Negative  Pulmonary:  Negative  Cardio: Negative  Gastrointestinal: Negative  Hematologic: Negative  Genitourinary: Negative  Musculoskeletal: Negative  Psychiatric: Negative  Neurologic: Negative  Skin: Negative  Endocrine: see HPI.            Physical Exam:   Blood pressure 105/65, pulse 82, height 1.481 m (4' 10.31\"), weight 57.6 kg (126 lb 15.8 oz).  Blood pressure reading is in the normal blood pressure range based on the 2017 AAP Clinical Practice Guideline.  Height: 148.1 cm   3 %ile (Z= -1.86) based on CDC (Girls, 2-20 Years) Stature-for-age data based on Stature recorded on 4/16/2021.  Weight: 57.6 kg (actual weight), 77 %ile " (Z= 0.75) based on CDC (Girls, 2-20 Years) weight-for-age data using vitals from 4/16/2021.  BMI: Body mass index is 26.26 kg/m . 94 %ile (Z= 1.52) based on CDC (Girls, 2-20 Years) BMI-for-age based on BMI available as of 4/16/2021.      Growth velocity: 1.449 cm/yr (0.57 in/yr), 28 %ile (Z=-0.58)  Constitutional: awake, alert, cooperative, no apparent distress  Eyes: Lids and lashes normal, sclera clear, conjunctiva normal  ENT: Normocephalic, without obvious abnormality, external ears without lesions  Neck: Supple, symmetrical, trachea midline, thyroid symmetric, not enlarged and no tenderness  Hematologic / Lymphatic: no cervical lymphadenopathy  Lungs: No increased work of breathing, clear to auscultation bilaterally with good air entry.  Cardiovascular: Regular rate and rhythm, no murmurs.  Abdomen: No scars, soft, non-distended, non-tender, no masses palpated, no hepatosplenomegaly  Genitourinary:  Breasts: Michele 5   Pubic hair: Michele  5  Musculoskeletal: There is no redness, warmth, or swelling of the joints.    Neurologic: Awake, alert, oriented to name, place and time.  Neuropsychiatric: normal  Skin: no lesions        Laboratory results:     Results for orders placed or performed in visit on 04/16/21   Insulin-Like Growth Factor 1 Ped     Status: None   Result Value Ref Range    Lab Scanned Result IGF-1 PEDIATRIC-Scanned    IGFBP-3     Status: None   Result Value Ref Range    IGF Binding Protein3 6.9 3.3 - 9.4 ug/mL    IGF Binding Protein 3 SD Score 0.3      04/16/2021:   IGF-1 to [facility]:           341 ng/dL          (200-664)  IGF-1 Z-Score:            -0.5 SDS            Assessment and Plan:   Lakeshia is a 11 year old 11 month old female with short stature related to small for gestational age.      Lakeshia had thelarche just prior to her 6th birthday (month prior) and underwent menarche 11/2017.  Her last bone age was read at the 15 year standard but growth rate has continued just above 1 inch per year.  Her  growth rate has now slowed to <1 inch per year.  She has stopped treatment with growth hormone replacement.  Today we reviewed where she started on treatment with growth hormone with her height attainment today.      Growth factors performed at this visit were normal off treatment with growth hormone.      Endocrine follow as needed.     Please refer to patient instructions for remainder of plan.      Patient Instructions     Thank you for choosing [facility]. It was a pleasure to see you for your office visit today.     If you have any questions or scheduling needs during regular office hours, please call our [place] clinic: [phone]   If urgent concerns arise after hours, you can call [phone] and ask to speak to the pediatric specialist on call.   If you need to schedule Radiology tests, please call: [phone]  My Chart messages are for routine communication and questions and are usually answered within 48-72 hours. If you have an urgent concern or require sooner response, please call us.  Outside lab and imaging results should be faxed to [phone].  If you go to a lab outside of [facility] we will not automatically get those results. You will need to ask to have them faxed.       If you had any blood work, imaging or other tests completed today:  Normal test results will be mailed to your home address in a letter.  Abnormal results will be communicated to you via phone call/letter.  Please allow up to 1-2 weeks for processing and interpretation of most lab work.    1.  Lakeshia completed treatment with growth hormone 2 weeks ago.  No issues off treatment.   2.  We reviewed growth from start to finish with treatment.    3.  Lakeshia started growth hormone in 7/2012 and was 37 inches prior to treatment.  Today she is 58.3 inches.    4.  Growth factors today off treatment.   5.  If labs are normal, then Lakeshia graduates from endocrine clinic!    Thank you for allowing me to participate in the  care of your patient.  Please do not hesitate to call with questions or concerns.    Sincerely,    DAVID Sam, CNP  Pediatric Endocrinology  [facility]  [facility]  [phone]    Assessment requiring an independent historian(s) - family - mother  Ordering of each unique test  30 minutes spent on the date of the encounter doing chart review, history and exam, documentation and further activities per the note    CC  Patient Care Team:  [facility] as PCP - Sintia Farfan APRN CNP as Assigned Pediatric Specialist Provider                    Again, thank you for allowing me to participate in the care of your patient.        Sincerely,        DAVID Merino CNP

## 2021-04-26 ENCOUNTER — HOSPITAL ENCOUNTER (EMERGENCY)
Age: 81
Discharge: HOME | End: 2021-04-26
Attending: EMERGENCY MEDICINE
Payer: MEDICARE

## 2021-04-26 VITALS
OXYGEN SATURATION: 98 % | RESPIRATION RATE: 16 BRPM | HEART RATE: 91 BPM | BODY MASS INDEX: 23.92 KG/M2 | WEIGHT: 135 LBS | TEMPERATURE: 97.9 F | SYSTOLIC BLOOD PRESSURE: 140 MMHG | DIASTOLIC BLOOD PRESSURE: 83 MMHG | HEIGHT: 63 IN

## 2021-04-26 DIAGNOSIS — E87.1 HYPONATREMIA: ICD-10-CM

## 2021-04-26 DIAGNOSIS — N39.8 VOIDING DYSFUNCTION: Primary | ICD-10-CM

## 2021-04-26 DIAGNOSIS — N39.0 LOWER URINARY TRACT INFECTION: ICD-10-CM

## 2021-04-26 LAB
BACTERIA: ABNORMAL /HPF
BILIRUBIN URINE: NEGATIVE MG/DL
BLOOD, URINE: ABNORMAL
BUN BLDV-MCNC: 23 MG/DL (ref 6–23)
CALCIUM SERPL-MCNC: 9.2 MG/DL (ref 8.3–10.6)
CAST TYPE: ABNORMAL /HPF
CHLORIDE BLD-SCNC: 91 MMOL/L (ref 99–110)
CLARITY: ABNORMAL
CO2: 18 MMOL/L (ref 21–32)
COLOR: YELLOW
CREAT SERPL-MCNC: 1 MG/DL (ref 0.6–1.1)
CRYSTAL TYPE: ABNORMAL /HPF
EPITHELIAL CELLS, UA: ABNORMAL /HPF
GFR AFRICAN AMERICAN: >60 ML/MIN/1.73M2
GFR NON-AFRICAN AMERICAN: 53 ML/MIN/1.73M2
GLUCOSE BLD-MCNC: 95 MG/DL (ref 70–99)
GLUCOSE, URINE: NEGATIVE MG/DL
KETONES, URINE: NEGATIVE MG/DL
LEUKOCYTE ESTERASE, URINE: ABNORMAL
MUCUS: NEGATIVE HPF
NITRITE URINE, QUANTITATIVE: POSITIVE
PH, URINE: 6.5 (ref 5–8)
POTASSIUM SERPL-SCNC: 3.7 MMOL/L (ref 3.5–5.1)
PROTEIN UA: NEGATIVE MG/DL
RBC URINE: ABNORMAL /HPF (ref 0–6)
SODIUM BLD-SCNC: 125 MMOL/L (ref 135–145)
SPECIFIC GRAVITY UA: 1 (ref 1–1.03)
UROBILINOGEN, URINE: 0.2 MG/DL (ref 0.2–1)
VOLUME, (UVOL): 12 ML (ref 10–12)
WBC UA: ABNORMAL /HPF (ref 0–5)

## 2021-04-26 PROCEDURE — 99285 EMERGENCY DEPT VISIT HI MDM: CPT

## 2021-04-26 PROCEDURE — 2580000003 HC RX 258: Performed by: EMERGENCY MEDICINE

## 2021-04-26 PROCEDURE — 80048 BASIC METABOLIC PNL TOTAL CA: CPT

## 2021-04-26 PROCEDURE — 81001 URINALYSIS AUTO W/SCOPE: CPT

## 2021-04-26 PROCEDURE — 87086 URINE CULTURE/COLONY COUNT: CPT

## 2021-04-26 RX ORDER — NITROFURANTOIN 25; 75 MG/1; MG/1
100 CAPSULE ORAL ONCE
Status: DISCONTINUED | OUTPATIENT
Start: 2021-04-26 | End: 2021-04-26 | Stop reason: HOSPADM

## 2021-04-26 RX ORDER — NITROFURANTOIN 25; 75 MG/1; MG/1
100 CAPSULE ORAL 2 TIMES DAILY
Qty: 20 CAPSULE | Refills: 0 | Status: SHIPPED | OUTPATIENT
Start: 2021-04-26 | End: 2021-04-28

## 2021-04-26 RX ORDER — 0.9 % SODIUM CHLORIDE 0.9 %
1000 INTRAVENOUS SOLUTION INTRAVENOUS ONCE
Status: COMPLETED | OUTPATIENT
Start: 2021-04-26 | End: 2021-04-26

## 2021-04-26 RX ADMIN — SODIUM CHLORIDE 1000 ML: 9 INJECTION, SOLUTION INTRAVENOUS at 19:55

## 2021-04-26 NOTE — ED PROVIDER NOTES
Please refer to the documentation completed by Dr. Edil Recinos for full details of the encounter. Results:  Results for orders placed or performed during the hospital encounter of 04/26/21   Urinalysis, reflex to microscopic   Result Value Ref Range    Color, UA YELLOW YELLOW    Clarity, UA HAZY (A) CLEAR    Glucose, Urine NEGATIVE NEGATIVE MG/DL    Bilirubin Urine NEGATIVE NEGATIVE MG/DL    Ketones, Urine NEGATIVE NEGATIVE MG/DL    Specific Gravity, UA 1.005 1.001 - 1.035    Blood, Urine SMALL NEGATIVE    pH, Urine 6.5 5.0 - 8.0    Protein, UA NEGATIVE NEGATIVE MG/DL    Urobilinogen, Urine 0.2 0.2 - 1.0 MG/DL    Nitrite Urine, Quantitative POSITIVE (A) NEGATIVE    Leukocyte Esterase, Urine LARGE NEGATIVE    Volume, (UVOL) 12 10 - 12 ML    RBC, UA NO CELLS SEEN 0 - 6 /HPF    WBC, UA 0 TO 3 0 - 5 /HPF    Epithelial Cells, UA 0 TO 3 /HPF    Cast Type NO CAST FORMS SEEN NO CAST FORMS SEEN /HPF    Bacteria, UA OCCASIONAL (A) NEGATIVE /HPF    Crystal Type MODERATE AMORPHOUS NEGATIVE /HPF    Mucus, UA NEGATIVE NEGATIVE HPF   Basic Metabolic Panel w/ Reflex to MG   Result Value Ref Range    Sodium 125 (L) 135 - 145 MMOL/L    Potassium 3.7 3.5 - 5.1 MMOL/L    Chloride 91 (L) 99 - 110 mMol/L    CO2 18 (L) 21 - 32 MMOL/L    BUN 23 6 - 23 MG/DL    CREATININE 1.0 0.6 - 1.1 MG/DL    Glucose 95 70 - 99 MG/DL    Calcium 9.2 8.3 - 10.6 MG/DL    GFR Non- 53 (L) >60 mL/min/1.73m2    GFR African American >60 >60 mL/min/1.73m2     Emergency department course:  Patient was initially seen by Dr. Jessica Torrez. Initial report is provided at shift change at approximately 1900. Report indicates patient had concern that she had not urinated since about 1 PM, about 5 hours prior to presentation. Bedside ultrasound showed no bladder distention. Abdomen was nonsurgical.  Several medical screening studies are pending. Outpatient management is anticipated. Upon most recent reevaluation, patient remains clinically well.   She is

## 2021-04-27 NOTE — ED NOTES
Discharged with instructions and rx x 1. Pt acknowledges understanding. Ambulatory at discharge.       Allen Art RN  04/26/21 2121

## 2021-04-28 ENCOUNTER — TELEPHONE (OUTPATIENT)
Dept: INTERNAL MEDICINE CLINIC | Age: 81
End: 2021-04-28

## 2021-04-28 ENCOUNTER — OFFICE VISIT (OUTPATIENT)
Dept: INTERNAL MEDICINE CLINIC | Age: 81
End: 2021-04-28
Payer: MEDICARE

## 2021-04-28 VITALS
TEMPERATURE: 98.9 F | WEIGHT: 138.4 LBS | SYSTOLIC BLOOD PRESSURE: 120 MMHG | DIASTOLIC BLOOD PRESSURE: 68 MMHG | OXYGEN SATURATION: 98 % | BODY MASS INDEX: 24.52 KG/M2 | HEART RATE: 64 BPM | RESPIRATION RATE: 16 BRPM

## 2021-04-28 DIAGNOSIS — R10.30 LOWER ABDOMINAL PAIN: Primary | ICD-10-CM

## 2021-04-28 DIAGNOSIS — E87.1 HYPONATREMIA: ICD-10-CM

## 2021-04-28 LAB
ANION GAP SERPL CALCULATED.3IONS-SCNC: 11 MMOL/L (ref 3–16)
BUN BLDV-MCNC: 16 MG/DL (ref 7–20)
CALCIUM SERPL-MCNC: 9.3 MG/DL (ref 8.3–10.6)
CHLORIDE BLD-SCNC: 95 MMOL/L (ref 99–110)
CO2: 24 MMOL/L (ref 21–32)
CREAT SERPL-MCNC: 0.8 MG/DL (ref 0.6–1.2)
CULTURE: NORMAL
GFR AFRICAN AMERICAN: >60
GFR NON-AFRICAN AMERICAN: >60
GLUCOSE BLD-MCNC: 83 MG/DL (ref 70–99)
Lab: NORMAL
POTASSIUM SERPL-SCNC: 4.5 MMOL/L (ref 3.5–5.1)
SODIUM BLD-SCNC: 130 MMOL/L (ref 136–145)
SPECIMEN: NORMAL

## 2021-04-28 PROCEDURE — G8399 PT W/DXA RESULTS DOCUMENT: HCPCS | Performed by: INTERNAL MEDICINE

## 2021-04-28 PROCEDURE — G8427 DOCREV CUR MEDS BY ELIG CLIN: HCPCS | Performed by: INTERNAL MEDICINE

## 2021-04-28 PROCEDURE — G8420 CALC BMI NORM PARAMETERS: HCPCS | Performed by: INTERNAL MEDICINE

## 2021-04-28 PROCEDURE — 1036F TOBACCO NON-USER: CPT | Performed by: INTERNAL MEDICINE

## 2021-04-28 PROCEDURE — 1123F ACP DISCUSS/DSCN MKR DOCD: CPT | Performed by: INTERNAL MEDICINE

## 2021-04-28 PROCEDURE — 36415 COLL VENOUS BLD VENIPUNCTURE: CPT | Performed by: INTERNAL MEDICINE

## 2021-04-28 PROCEDURE — 99213 OFFICE O/P EST LOW 20 MIN: CPT | Performed by: INTERNAL MEDICINE

## 2021-04-28 PROCEDURE — 4040F PNEUMOC VAC/ADMIN/RCVD: CPT | Performed by: INTERNAL MEDICINE

## 2021-04-28 PROCEDURE — 1090F PRES/ABSN URINE INCON ASSESS: CPT | Performed by: INTERNAL MEDICINE

## 2021-04-28 ASSESSMENT — PATIENT HEALTH QUESTIONNAIRE - PHQ9: SUM OF ALL RESPONSES TO PHQ QUESTIONS 1-9: 1

## 2021-04-28 NOTE — PROGRESS NOTES
[facility]  Patient's  is 1940  Seen in office on 2021      SUBJECTIVE:  Marci Colvin keena 78 y. o.year old female presents today   Chief Complaint   Patient presents with    [place] [facility] [place] [facility] [place]     Pt states she has difficulty urinating and went to ER   Bladder scan ws normal per ER physician. UA was abnormal : ER ordered antibiotic : but pt refused as she was afraid of C.diff  Urine culture came back normal.  Patient has not taken the antibiotic. Pt hs low NA was 125 : pt states she was drinking more fluid than usual as she was not urinating. Patient has slight diarrhea later. Patient is complaining of lower abdominal pain in the suprapubic area slightly to the left. Denies any constipation. No nausea or vomiting. She still feels she is not able to urinate. Taking medications regularly. No side effects noted. Review of Systems    OBJECTIVE: /68   Pulse 64   Temp 98.9 °F (37.2 °C) (Oral)   Resp 16   Wt 138 lb 6.4 oz (62.8 kg)   SpO2 98%   BMI 24.52 kg/m²     Wt Readings from Last 3 Encounters:   21 138 lb 6.4 oz (62.8 kg)   21 135 lb (61.2 kg)   21 136 lb 9.6 oz (62 kg)        Patient was seen taking COVID-19 precautions. Face mask, gloves and eyes protectors were used. Patient also wore facemask. GENERAL: - Alert, oriented, pleasant, in no apparent distress. HEENT: - Conjunctiva pink, no scleral icterus. ENT clear. NECK: -Supple. No jugular venous distention noted. No masses felt,  CARDIOVASCULAR: - Normal S1 and S2    PULMONARY: - No respiratory distress. No wheezes or rales. ABDOMEN: - Soft and non-tender,no masses  ororganomegaly. Mild tenderness in the lower abdomen in the suprapubic area in the left lower quadrant  EXTREMITIES: - No cyanosis, clubbing, or significant edema. SKIN: Skin is warm and dry. NEUROLOGICAL: - Cranial nerves II through XII are grossly intact.       IMPRESSION:    Encounter Diagnoses Name Primary?  Lower abdominal pain Yes    Hyponatremia        ASSESSMENT/PLAN:    NA wa 125 ; repeat BMP  Decrease fluid intake to 6 glasses a day. Patient continues to have the lower abdominal pain. UA was slightly abnormal but the cultures came back negative. Will get CT scan of the abdomen to rule out diverticulitis etc. for  Lower ab pain : CT abdomen r/o diverticulitis etc  Patient has a follow-up appointment in May. Advised her to keep that  If the symptoms get worse she should call        Mediations reviewed with the patient. Continue current medications. Appropriate prescriptions are addressed. After visit summeryprovided. Follow up as directed sooner if needed. Questions answered and patient verbalizes understanding. Call for any problems, questions, or concerns. Allergies   Allergen Reactions    Amiodarone      Severe hair loss    Amoxicillin Other (See Comments)     Pt states she got C-diff. , so she doesn't like atb     Current Outpatient Medications   Medication Sig Dispense Refill    levothyroxine (SYNTHROID) 50 MCG tablet TAKE 1 TABLET BY MOUTH DAILY 90 tablet 1    atorvastatin (LIPITOR) 10 MG tablet TAKE 1 TABLET EVERY DAY 90 tablet 1    benazepril (LOTENSIN) 20 MG tablet TAKE 1 TABLET BY MOUTH DAILY 90 tablet 1    aspirin 81 MG tablet Take 81 mg by mouth daily Take 2 tabs by mouth in am and one tab at hs      Magnesium 125 MG CAPS Take by mouth daily      lactobacillus (CULTURELLE) capsule Take 1 capsule by mouth 2 times daily (with meals) 30 capsule 0    Coenzyme Q10 (CO Q 10) 100 MG CAPS Take by mouth daily       Cholecalciferol (VITAMIN D3) 2000 UNITS CAPS Take 1 capsule by mouth daily       Multiple Vitamins-Minerals (MULTIVITAMIN & MINERAL PO) Take 1 tablet by mouth daily      calcium carbonate (OSCAL) 500 MG TABS tablet Take 500 mg by mouth daily      nitrofurantoin, macrocrystal-monohydrate, (MACROBID) 100 MG capsule Take 1 capsule by mouth 2 times daily for 10 days (Patient not taking: Reported on 4/28/2021) 20 capsule 0    zoster recombinant adjuvanted vaccine (SHINGRIX) 50 MCG/0.5ML SUSR injection 50 MCG IM then repeat 2-6 months. (Patient not taking: Reported on 2/26/2021) 0.5 mL 1     No current facility-administered medications for this visit. Past Medical History:   Diagnosis Date    Acute deep vein thrombosis (DVT) of distal vein of left lower extremity ([address] 10/23/2016    DVT involving left peroneal, posterior and anterior tibial veins 10/16 Treated for at least 3 months and repeat venous doppler was negative and was discontinued     Axillary adenopathy 8/28/2014    US of axilla 6/14 showed benign lymph nodes    C. difficile diarrhea 11/18/2016    Treated few times and now is normal    CAD (coronary artery disease)     Chest discomfort 8/1/2017    Colitis 12/13    EGD,COLONOSCOPY, SBFT normal. Dr. Po Gonzales    Cyst of right ovary 9/6/2016    Seen Dr Brooke Kimbrough and had several US per pt.  Dizzy spells 6/5/2017    H/O 24 hour EKG monitoring 7/27/09    NSR, few PVCs and occasional couplet noted (total 1007 PVCs), no pauses noted.  H/O colonoscopy 10/09, 12/13    f/u in 5 years    H/O echocardiogram 9/17/12    Normal LV size and function, mild mitral and tricuspid regurg., EF 60%.  H/O mitral valve prolapse 2003    MR mild    H/O myocardial perfusion scan 12/16/14    Stress Cardiolite. Normal perfusion in the distribution of all coronaries, normal LV size and function, EF 70%.  H/O screening mammography 8/11    Dr Nikolas Olivares History of cardiac monitoring 06/05/2017    Abnormal-SR with complex ventricular arrhythmias and frequent PVCs. No symptoms during marked bradycardia or during the fastest rate of 143 with nonsustained three-beat runs of ventricular tachycardia.      History of cardiac monitoring 08/23/2017    Abnormal event monitor findings suggestive of predominantly sinus bradycardia with isolated and frequent low-grade ventricular ectopy with symptoms reported.  Hx of Doppler ultrasound 10/17/2017    duplex venous doppler-minimal reflux noted in the left CFV    Hyperlipidemia     Hypertension     Hypothyroidism     Liver dysfunction     liver bx 10/09,mild steaotosis    Osteoarthritis of cervical spine 6/7/2016    Ovarian cyst     Papanicolaou smear 09/14/2016    Dr. Simona See PVCs (premature ventricular contractions)     symptomatic frequent PVCs, sees Dr. Joi Dominguez Ventricular tachycardia ([address] 8/1/2017    Exercise induced 8/1/17     Past Surgical History:   Procedure Laterality Date    APPENDECTOMY      BREAST BIOPSY      CATARACT REMOVAL Bilateral     COLONOSCOPY  12/3/13,2-28-17 2.28.17 no need for follow up due to pt's advanced age per Dr. Cindi Mcneil.  DIAGNOSTIC CARDIAC CATH LAB PROCEDURE  08/03/2017    Normal coronaries, EF 70%.  HYSTERECTOMY  1961    LIVER BIOPSY  10/09    steasosis    UPPER GASTROINTESTINAL ENDOSCOPY  11/02/2017    Dr. Márquez Semen History     Tobacco Use    Smoking status: Never Smoker    Smokeless tobacco: Never Used   Substance Use Topics    Alcohol use:  Yes     Alcohol/week: 0.0 standard drinks     Comment:  1 beers per day or 2 glasses of \"bubbly\"/day       LAB REVIEW:  CBC:   Lab Results   Component Value Date    WBC 7.1 05/19/2020    HGB 13.3 05/19/2020    HCT 39.2 05/19/2020     05/19/2020     Lipids:   Lab Results   Component Value Date    HDL 79 12/04/2020    LDLCALC 89 05/19/2020    LDLDIRECT 64 12/04/2020    TRIGLYCFAST 49 12/04/2020    CHOLFAST 144 12/04/2020     Renal:   Lab Results   Component Value Date    BUN 23 04/26/2021    CREATININE 1.0 04/26/2021     04/26/2021    K 3.7 04/26/2021    ALT 27 12/04/2020    AST 34 12/04/2020    GLUCOSE 95 04/26/2021    GLUF 95 12/04/2020     PT/INR:   Lab Results   Component Value Date    INR 0.91 10/17/2017     A1C:   Lab Results   Component Value Date    LABA1C 5.5 11/13/2019           Jacob Fermin MD, 4/28/2021 , 2:08 PM

## 2021-04-30 ENCOUNTER — HOSPITAL ENCOUNTER (OUTPATIENT)
Dept: CT IMAGING | Age: 81
Discharge: HOME | End: 2021-04-30
Payer: MEDICARE

## 2021-04-30 ENCOUNTER — TELEPHONE (OUTPATIENT)
Dept: INTERNAL MEDICINE CLINIC | Age: 81
End: 2021-04-30

## 2021-04-30 DIAGNOSIS — R10.30 LOWER ABDOMINAL PAIN: ICD-10-CM

## 2021-04-30 PROCEDURE — 74176 CT ABD & PELVIS W/O CONTRAST: CPT

## 2021-04-30 NOTE — TELEPHONE ENCOUNTER
Left message on lab results as directed by patient . Sodium at 135 and Dr. Kevin Ramos to back off on water intake.

## 2021-05-17 NOTE — RESULT ENCOUNTER NOTE
Patient notified of CT of abd and pelvis results, verbal understanding returned and results faxed to Dr. Rosana Vazquez office.

## 2021-05-25 ENCOUNTER — OFFICE VISIT (OUTPATIENT)
Dept: INTERNAL MEDICINE CLINIC | Age: 81
End: 2021-05-25
Payer: MEDICARE

## 2021-05-25 VITALS
HEART RATE: 76 BPM | WEIGHT: 134 LBS | DIASTOLIC BLOOD PRESSURE: 76 MMHG | RESPIRATION RATE: 16 BRPM | TEMPERATURE: 98.6 F | SYSTOLIC BLOOD PRESSURE: 136 MMHG | OXYGEN SATURATION: 97 % | BODY MASS INDEX: 23.74 KG/M2

## 2021-05-25 DIAGNOSIS — I10 ESSENTIAL HYPERTENSION: ICD-10-CM

## 2021-05-25 DIAGNOSIS — E87.1 HYPONATREMIA: Primary | ICD-10-CM

## 2021-05-25 DIAGNOSIS — I49.3 PVCS (PREMATURE VENTRICULAR CONTRACTIONS): ICD-10-CM

## 2021-05-25 DIAGNOSIS — K21.9 GASTROESOPHAGEAL REFLUX DISEASE WITHOUT ESOPHAGITIS: ICD-10-CM

## 2021-05-25 DIAGNOSIS — N83.201 CYST OF RIGHT OVARY: ICD-10-CM

## 2021-05-25 DIAGNOSIS — E03.9 ACQUIRED HYPOTHYROIDISM: ICD-10-CM

## 2021-05-25 DIAGNOSIS — E78.2 MIXED HYPERLIPIDEMIA: ICD-10-CM

## 2021-05-25 LAB
A/G RATIO: 1.9 (ref 1.1–2.2)
ALBUMIN SERPL-MCNC: 4.5 G/DL (ref 3.4–5)
ALP BLD-CCNC: 66 U/L (ref 40–129)
ALT SERPL-CCNC: 20 U/L (ref 10–40)
ANION GAP SERPL CALCULATED.3IONS-SCNC: 14 MMOL/L (ref 3–16)
AST SERPL-CCNC: 28 U/L (ref 15–37)
BILIRUB SERPL-MCNC: 0.5 MG/DL (ref 0–1)
BUN BLDV-MCNC: 23 MG/DL (ref 7–20)
CALCIUM SERPL-MCNC: 9.7 MG/DL (ref 8.3–10.6)
CHLORIDE BLD-SCNC: 104 MMOL/L (ref 99–110)
CHOLESTEROL, FASTING: 131 MG/DL (ref 0–199)
CO2: 23 MMOL/L (ref 21–32)
CREAT SERPL-MCNC: 0.9 MG/DL (ref 0.6–1.2)
GFR AFRICAN AMERICAN: >60
GFR NON-AFRICAN AMERICAN: 60
GLOBULIN: 2.4 G/DL
GLUCOSE BLD-MCNC: 94 MG/DL (ref 70–99)
HDLC SERPL-MCNC: 65 MG/DL (ref 40–60)
LDL CHOLESTEROL CALCULATED: 54 MG/DL
POTASSIUM SERPL-SCNC: 4.8 MMOL/L (ref 3.5–5.1)
SODIUM BLD-SCNC: 141 MMOL/L (ref 136–145)
TOTAL PROTEIN: 6.9 G/DL (ref 6.4–8.2)
TRIGLYCERIDE, FASTING: 61 MG/DL (ref 0–150)
TSH SERPL DL<=0.05 MIU/L-ACNC: 1.22 UIU/ML (ref 0.27–4.2)
VLDLC SERPL CALC-MCNC: 12 MG/DL

## 2021-05-25 PROCEDURE — G8399 PT W/DXA RESULTS DOCUMENT: HCPCS | Performed by: INTERNAL MEDICINE

## 2021-05-25 PROCEDURE — 1036F TOBACCO NON-USER: CPT | Performed by: INTERNAL MEDICINE

## 2021-05-25 PROCEDURE — 36415 COLL VENOUS BLD VENIPUNCTURE: CPT | Performed by: INTERNAL MEDICINE

## 2021-05-25 PROCEDURE — 1123F ACP DISCUSS/DSCN MKR DOCD: CPT | Performed by: INTERNAL MEDICINE

## 2021-05-25 PROCEDURE — 1090F PRES/ABSN URINE INCON ASSESS: CPT | Performed by: INTERNAL MEDICINE

## 2021-05-25 PROCEDURE — G8427 DOCREV CUR MEDS BY ELIG CLIN: HCPCS | Performed by: INTERNAL MEDICINE

## 2021-05-25 PROCEDURE — G8420 CALC BMI NORM PARAMETERS: HCPCS | Performed by: INTERNAL MEDICINE

## 2021-05-25 PROCEDURE — 3288F FALL RISK ASSESSMENT DOCD: CPT | Performed by: INTERNAL MEDICINE

## 2021-05-25 PROCEDURE — 4040F PNEUMOC VAC/ADMIN/RCVD: CPT | Performed by: INTERNAL MEDICINE

## 2021-05-25 PROCEDURE — 99214 OFFICE O/P EST MOD 30 MIN: CPT | Performed by: INTERNAL MEDICINE

## 2021-05-25 RX ORDER — BENAZEPRIL HYDROCHLORIDE 20 MG/1
TABLET ORAL
Qty: 90 TABLET | Refills: 1 | Status: SHIPPED | OUTPATIENT
Start: 2021-05-25 | End: 2021-08-25 | Stop reason: SDUPTHER

## 2021-05-25 RX ORDER — ATORVASTATIN CALCIUM 10 MG/1
TABLET, FILM COATED ORAL
Qty: 90 TABLET | Refills: 1 | Status: SHIPPED | OUTPATIENT
Start: 2021-05-25 | End: 2021-11-23 | Stop reason: SDUPTHER

## 2021-05-25 ASSESSMENT — PATIENT HEALTH QUESTIONNAIRE - PHQ9
DEPRESSION UNABLE TO ASSESS: PT REFUSES
SUM OF ALL RESPONSES TO PHQ QUESTIONS 1-9: 1

## 2021-05-25 NOTE — PROGRESS NOTES
Serjio Almeida  Patient's  is 1940  Seen in office on 2021      SUBJECTIVE:  Kit brink 78 y. o.year old female presents today   Chief Complaint   Patient presents with    3 Month Follow-Up    Results     lab    Medication Refill     wants printed prescriptions     Pt states she talked to Dr Marie Mayo office and he wants an US done and she has appointment with him  Pt had hyponatremia and Na was 1245 and then 130. Not on diuretic  No chest pain. No SOB  No   Taking medications regularly. No side effects noted. Review of Systems    OBJECTIVE: /76   Pulse 76 Comment: irreg  Temp 98.6 °F (37 °C) (Oral)   Resp 16   Wt 134 lb (60.8 kg)   SpO2 97%   BMI 23.74 kg/m²     Wt Readings from Last 3 Encounters:   21 134 lb (60.8 kg)   21 138 lb 6.4 oz (62.8 kg)   21 135 lb (61.2 kg)        Patient was seen taking COVID-19 precautions. Face mask, gloves were used. Patient also wore facemask. GENERAL: - Alert, oriented, pleasant, in no apparent distress. HEENT: - Conjunctiva pink, no scleral icterus. ENT clear. NECK: -Supple. No jugular venous distention noted. No masses felt,  CARDIOVASCULAR: - Normal S1 and S2    PULMONARY: - No respiratory distress. No wheezes or rales. ABDOMEN: - Soft and non-tender,no masses  ororganomegaly. EXTREMITIES: - No cyanosis, clubbing, or significant edema. SKIN: Skin is warm and dry. NEUROLOGICAL: - Cranial nerves II through XII are grossly intact. IMPRESSION:    Encounter Diagnoses   Name Primary?  Hyponatremia Yes    Cyst of right ovary     Essential hypertension     Gastroesophageal reflux disease without esophagitis     Mixed hyperlipidemia     PVCs (premature ventricular contractions)     Acquired hypothyroidism        ASSESSMENT/PLAN:    Cyst of right ovary  Seen Dr Everett Bowman and had several US per pt.   Last CT 2021 : showed slight increase in ovarian cyst. Referred to Dr Marie Mayo and he is going to see her and get US done     Essential hypertension  Patient has HTN for > 10 years  Patient is on benazepril 20 mg daily    Gastroesophageal reflux disease without esophagitis  Pt had EGD done in 11/17 : stopped taking PPI  Doing well with out it    Mixed hyperlipidemia  Patient on lipitor for hyperlipidemia  Recheck lipid       PVCs (premature ventricular contractions)  Pt is doing well. No palpitation     Acquired hypothyroidism  Pt is taking synthroid 50 mcg daily    Hyponatremia  Na was 130  Pt states she was drinking lot of water. Return to office in 3 months. Mediations reviewed with the patient. Continue current medications. Appropriate prescriptions are addressed. After visit summeryprovided. Follow up as directed sooner if needed. Questions answered and patient verbalizes understanding. Call for any problems, questions, or concerns. Allergies   Allergen Reactions    Amiodarone      Severe hair loss    Amoxicillin Other (See Comments)     Pt states she got C-diff. , so she doesn't like atb     Current Outpatient Medications   Medication Sig Dispense Refill    levothyroxine (SYNTHROID) 50 MCG tablet TAKE 1 TABLET BY MOUTH DAILY 90 tablet 1    atorvastatin (LIPITOR) 10 MG tablet TAKE 1 TABLET EVERY DAY 90 tablet 1    benazepril (LOTENSIN) 20 MG tablet TAKE 1 TABLET BY MOUTH DAILY 90 tablet 1    aspirin 81 MG tablet Take 81 mg by mouth daily Take 2 tabs by mouth in am and one tab at hs      Magnesium 125 MG CAPS Take by mouth daily      lactobacillus (CULTURELLE) capsule Take 1 capsule by mouth 2 times daily (with meals) 30 capsule 0    Coenzyme Q10 (CO Q 10) 100 MG CAPS Take by mouth daily       Cholecalciferol (VITAMIN D3) 2000 UNITS CAPS Take 1 capsule by mouth daily       Multiple Vitamins-Minerals (MULTIVITAMIN & MINERAL PO) Take 1 tablet by mouth daily      calcium carbonate (OSCAL) 500 MG TABS tablet Take 500 mg by mouth daily       No current facility-administered medications for this visit. Past Medical History:   Diagnosis Date    Acute deep vein thrombosis (DVT) of distal vein of left lower extremity ([address] 10/23/2016    DVT involving left peroneal, posterior and anterior tibial veins 10/16 Treated for at least 3 months and repeat venous doppler was negative and was discontinued     Axillary adenopathy 8/28/2014    US of axilla 6/14 showed benign lymph nodes    C. difficile diarrhea 11/18/2016    Treated few times and now is normal    CAD (coronary artery disease)     Chest discomfort 8/1/2017    Colitis 12/13    EGD,COLONOSCOPY, SBFT normal. Dr. Maurilio Thakkar    Cyst of right ovary 9/6/2016    Seen Dr Johnie Leos and had several US per pt.  Dizzy spells 6/5/2017    H/O 24 hour EKG monitoring 7/27/09    NSR, few PVCs and occasional couplet noted (total 1007 PVCs), no pauses noted.  H/O colonoscopy 10/09, 12/13    f/u in 5 years    H/O echocardiogram 9/17/12    Normal LV size and function, mild mitral and tricuspid regurg., EF 60%.  H/O mitral valve prolapse 2003    MR mild    H/O myocardial perfusion scan 12/16/14    Stress Cardiolite. Normal perfusion in the distribution of all coronaries, normal LV size and function, EF 70%.  H/O screening mammography 8/11    Dr Reyes Duong History of cardiac monitoring 06/05/2017    Abnormal-SR with complex ventricular arrhythmias and frequent PVCs. No symptoms during marked bradycardia or during the fastest rate of 143 with nonsustained three-beat runs of ventricular tachycardia.  History of cardiac monitoring 08/23/2017    Abnormal event monitor findings suggestive of predominantly sinus bradycardia with isolated and frequent low-grade ventricular ectopy with symptoms reported.      Hx of Doppler ultrasound 10/17/2017    duplex venous doppler-minimal reflux noted in the left CFV    Hyperlipidemia     Hypertension     Hypothyroidism     Liver dysfunction     liver bx 10/09,mild steaotosis    Osteoarthritis of cervical spine 6/7/2016    Ovarian cyst     Papanicolaou smear 09/14/2016    Dr. Joyce Castellanos PVCs (premature ventricular contractions)     symptomatic frequent PVCs, sees Dr. Jody Amaral [facility] 8/1/2017    Exercise induced 8/1/17     Past Surgical History:   Procedure Laterality Date    APPENDECTOMY      BREAST BIOPSY      CATARACT REMOVAL Bilateral     COLONOSCOPY  12/3/13,2-28-17 2.28.17 no need for follow up due to pt's advanced age per Dr. Nia Flores.  DIAGNOSTIC CARDIAC CATH LAB PROCEDURE  08/03/2017    Normal coronaries, EF 70%.  HYSTERECTOMY  1961    LIVER BIOPSY  10/09    steasosis    UPPER GASTROINTESTINAL ENDOSCOPY  11/02/2017    Dr. Vincent Nye History     Tobacco Use    Smoking status: Never Smoker    Smokeless tobacco: Never Used   Substance Use Topics    Alcohol use:  Yes     Alcohol/week: 0.0 standard drinks     Comment:  1 beers per day or 2 glasses of \"bubbly\"/day       LAB REVIEW:  CBC:   Lab Results   Component Value Date    WBC 7.1 05/19/2020    HGB 13.3 05/19/2020    HCT 39.2 05/19/2020     05/19/2020     Lipids:   Lab Results   Component Value Date    HDL 79 12/04/2020    LDLCALC 89 05/19/2020    LDLDIRECT 64 12/04/2020    TRIGLYCFAST 49 12/04/2020    CHOLFAST 144 12/04/2020     Renal:   Lab Results   Component Value Date    BUN 16 04/28/2021    CREATININE 0.8 04/28/2021     04/28/2021    K 4.5 04/28/2021    ALT 27 12/04/2020    AST 34 12/04/2020    GLUCOSE 83 04/28/2021    GLUF 95 12/04/2020     PT/INR:   Lab Results   Component Value Date    INR 0.91 10/17/2017     A1C:   Lab Results   Component Value Date    LABA1C 5.5 11/13/2019           Mike Valadez MD, 5/25/2021 , 9:07 AM

## 2021-05-25 NOTE — ASSESSMENT & PLAN NOTE
Seen Dr Carlos Cummings and had several US per pt.   Last CT 4/2021 : showed slight increase in ovarian cyst. Referred to Dr Cj Mills and he is going to see her and get US done

## 2021-05-28 ENCOUNTER — VIRTUAL VISIT (OUTPATIENT)
Dept: INTERNAL MEDICINE CLINIC | Age: 81
End: 2021-05-28
Payer: MEDICARE

## 2021-05-28 DIAGNOSIS — Z00.00 ROUTINE GENERAL MEDICAL EXAMINATION AT A HEALTH CARE FACILITY: Primary | ICD-10-CM

## 2021-05-28 PROCEDURE — G0439 PPPS, SUBSEQ VISIT: HCPCS | Performed by: INTERNAL MEDICINE

## 2021-05-28 PROCEDURE — 4040F PNEUMOC VAC/ADMIN/RCVD: CPT | Performed by: INTERNAL MEDICINE

## 2021-05-28 PROCEDURE — 1123F ACP DISCUSS/DSCN MKR DOCD: CPT | Performed by: INTERNAL MEDICINE

## 2021-05-28 SDOH — ECONOMIC STABILITY: FOOD INSECURITY: WITHIN THE PAST 12 MONTHS, THE FOOD YOU BOUGHT JUST DIDN'T LAST AND YOU DIDN'T HAVE MONEY TO GET MORE.: NEVER TRUE

## 2021-05-28 SDOH — ECONOMIC STABILITY: FOOD INSECURITY: WITHIN THE PAST 12 MONTHS, YOU WORRIED THAT YOUR FOOD WOULD RUN OUT BEFORE YOU GOT MONEY TO BUY MORE.: NEVER TRUE

## 2021-05-28 ASSESSMENT — LIFESTYLE VARIABLES
HOW OFTEN DURING THE LAST YEAR HAVE YOU NEEDED AN ALCOHOLIC DRINK FIRST THING IN THE MORNING TO GET YOURSELF GOING AFTER A NIGHT OF HEAVY DRINKING: 0
AUDIT TOTAL SCORE: 4
HOW OFTEN DURING THE LAST YEAR HAVE YOU FOUND THAT YOU WERE NOT ABLE TO STOP DRINKING ONCE YOU HAD STARTED: 0
HOW OFTEN DO YOU HAVE SIX OR MORE DRINKS ON ONE OCCASION: 0
HAS A RELATIVE, FRIEND, DOCTOR, OR ANOTHER HEALTH PROFESSIONAL EXPRESSED CONCERN ABOUT YOUR DRINKING OR SUGGESTED YOU CUT DOWN: 0

## 2021-05-28 ASSESSMENT — PATIENT HEALTH QUESTIONNAIRE - PHQ9: 2. FEELING DOWN, DEPRESSED OR HOPELESS: 0

## 2021-05-28 ASSESSMENT — SOCIAL DETERMINANTS OF HEALTH (SDOH): HOW HARD IS IT FOR YOU TO PAY FOR THE VERY BASICS LIKE FOOD, HOUSING, MEDICAL CARE, AND HEATING?: NOT HARD AT ALL

## 2021-05-28 NOTE — PATIENT INSTRUCTIONS
Personalized Preventive Plan for Trey Marie - 5/28/2021  Medicare offers a range of preventive health benefits. Some of the tests and screenings are paid in full while other may be subject to a deductible, co-insurance, and/or copay. Some of these benefits include a comprehensive review of your medical history including lifestyle, illnesses that may run in your family, and various assessments and screenings as appropriate. After reviewing your medical record and screening and assessments performed today your provider may have ordered immunizations, labs, imaging, and/or referrals for you. A list of these orders (if applicable) as well as your Preventive Care list are included within your After Visit Summary for your review. Other Preventive Recommendations:    · A preventive eye exam performed by an eye specialist is recommended every 1-2 years to screen for glaucoma; cataracts, macular degeneration, and other eye disorders. · A preventive dental visit is recommended every 6 months. · Try to get at least 150 minutes of exercise per week or 10,000 steps per day on a pedometer . · Order or download the FREE \"Exercise & Physical Activity: Your Everyday Guide\" from [employer]. Call 6-965.863.4485 or search [employer] online. · You need 6075-0868 mg of calcium and 8493-1497 IU of vitamin D per day. It is possible to meet your calcium requirement with diet alone, but a vitamin D supplement is usually necessary to meet this goal.  · When exposed to the sun, use a sunscreen that protects against both UVA and UVB radiation with an SPF of 30 or greater. Reapply every 2 to 3 hours or after sweating, drying off with a towel, or swimming. · Always wear a seat belt when traveling in a car. Always wear a helmet when riding a bicycle or motorcycle.

## 2021-05-28 NOTE — PROGRESS NOTES
Medicare Annual Wellness Visit  Name: Lorraine Mckoy Date: 2021   MRN: [id number] Sex: Female   Age: 78 y.o. Ethnicity: Non-/Non    : 1940 Race: Tata Wilkes is here for Medicare AWV    Screenings for behavioral, psychosocial and functional/safety risks, and cognitive dysfunction are all negative except as indicated below. These results, as well as other patient data from the [address] form, are documented in Flowsheets linked to this Encounter. Allergies   Allergen Reactions    Amiodarone      Severe hair loss    Amoxicillin Other (See Comments)     Pt states she got C-diff. , so she doesn't like atb       Prior to Visit Medications    Medication Sig Taking?  Authorizing Provider   atorvastatin (LIPITOR) 10 MG tablet TAKE 1 TABLET EVERY DAY Yes Errol Molina MD   benazepril (LOTENSIN) 20 MG tablet TAKE 1 TABLET BY MOUTH DAILY Yes Errol Molina MD   levothyroxine (SYNTHROID) 50 MCG tablet TAKE 1 TABLET BY MOUTH DAILY Yes Errol Molina MD   aspirin 81 MG tablet Take 81 mg by mouth daily Take 2 tabs by mouth in am and one tab at hs Yes Historical Provider, MD   Magnesium 125 MG CAPS Take by mouth daily Yes Historical Provider, MD   lactobacillus (CULTURELLE) capsule Take 1 capsule by mouth 2 times daily (with meals) Yes Shaista Nunes MD   Coenzyme Q10 (CO Q 10) 100 MG CAPS Take by mouth daily  Yes Historical Provider, MD   Cholecalciferol (VITAMIN D3) 2000 UNITS CAPS Take 1 capsule by mouth daily  Yes Historical Provider, MD   Multiple Vitamins-Minerals (MULTIVITAMIN & MINERAL PO) Take 1 tablet by mouth daily Yes Historical Provider, MD   calcium carbonate (OSCAL) 500 MG TABS tablet Take 500 mg by mouth daily Yes Historical Provider, MD       Past Medical History:   Diagnosis Date    Acute deep vein thrombosis (DVT) of distal vein of left lower extremity ([address] 10/23/2016    DVT involving left peroneal, posterior and anterior tibial veins 10/16 Treated for at least 3 months and repeat venous doppler was negative and was discontinued     Axillary adenopathy 8/28/2014    US of axilla 6/14 showed benign lymph nodes    C. difficile diarrhea 11/18/2016    Treated few times and now is normal    CAD (coronary artery disease)     Chest discomfort 8/1/2017    Colitis 12/13    EGD,COLONOSCOPY, SBFT normal. Dr. Chris Sharma Cyst of right ovary 9/6/2016    Seen Dr Lizzie De Santiago and had several US per pt.  Dizzy spells 6/5/2017    H/O 24 hour EKG monitoring 7/27/09    NSR, few PVCs and occasional couplet noted (total 1007 PVCs), no pauses noted.  H/O colonoscopy 10/09, 12/13    f/u in 5 years    H/O echocardiogram 9/17/12    Normal LV size and function, mild mitral and tricuspid regurg., EF 60%.  H/O mitral valve prolapse 2003    MR mild    H/O myocardial perfusion scan 12/16/14    Stress Cardiolite. Normal perfusion in the distribution of all coronaries, normal LV size and function, EF 70%.  H/O screening mammography 8/11    Dr Juan Berrios History of cardiac monitoring 06/05/2017    Abnormal-SR with complex ventricular arrhythmias and frequent PVCs. No symptoms during marked bradycardia or during the fastest rate of 143 with nonsustained three-beat runs of ventricular tachycardia.  History of cardiac monitoring 08/23/2017    Abnormal event monitor findings suggestive of predominantly sinus bradycardia with isolated and frequent low-grade ventricular ectopy with symptoms reported.      Hx of Doppler ultrasound 10/17/2017    duplex venous doppler-minimal reflux noted in the left CFV    Hyperlipidemia     Hypertension     Hypothyroidism     Liver dysfunction     liver bx 10/09,mild steaotosis    Osteoarthritis of cervical spine 6/7/2016    Ovarian cyst     Papanicolaou smear 09/14/2016    Dr. Juan Berrios PVCs (premature ventricular contractions)     symptomatic frequent PVCs, sees Dr. Lori Bailey Ventricular tachycardia ([facility] [address] 8/1/2017    Exercise induced 8/1/17 Past Surgical History:   Procedure Laterality Date    APPENDECTOMY      BREAST BIOPSY      CATARACT REMOVAL Bilateral     COLONOSCOPY  12/3/13,2-28-17    2.28.17 no need for follow up due to pt's advanced age per Dr. Kevin Chaudhari.  DIAGNOSTIC CARDIAC CATH LAB PROCEDURE  08/03/2017    Normal coronaries, EF 70%.  HYSTERECTOMY  1961    LIVER BIOPSY  10/09    steasosis    UPPER GASTROINTESTINAL ENDOSCOPY  11/02/2017    Dr. Kevin Chaudhari       Family History   Problem Relation Age of Onset    Heart Surgery Mother         CABG    Rheum Arthritis Mother     Osteoarthritis Mother     Hypertension Mother     High Cholesterol Mother     Migraines Mother     Stroke Mother     Heart Disease Mother     Heart Disease Brother     Heart Attack Father     Heart Disease Father     Heart Failure Maternal Grandmother     Heart Disease Brother        CareTeam (Including outside providers/suppliers regularly involved in providing care):   Patient Care Team:  Frank Bermudez MD as PCP - General (Internal Medicine)  Frank Bermudez MD as PCP - [facility]  Hazle Kussmaul, MD as Consulting Physician (Cardiology)  Marquez Kurtz MD as Consulting Physician (Gastroenterology)  Surya Agudelo MD as Consulting Physician (Electrophysiology)    Wt Readings from Last 3 Encounters:   05/25/21 134 lb (60.8 kg)   04/28/21 138 lb 6.4 oz (62.8 kg)   04/26/21 135 lb (61.2 kg)     There were no vitals filed for this visit. There is no height or weight on file to calculate BMI. Based upon direct observation of the patient, evaluation of cognition reveals recent and remote memory intact. Patient's complete Health Risk Assessment and screening values have been reviewed and are found in Flowsheets. The following problems were reviewed today and where indicated follow up appointments were made and/or referrals ordered.     Positive Risk Factor Screenings with Interventions:       Depression:  PHQ-2 Score: 0  PHQ-9 Total Score: 0 Severity:1-4 = minimal depression, 5-9 = mild depression, 10-14 = moderate depression, 15-19 = moderately severe depression, 20-27 = severe depression      General Health and ACP:  General  In general, how would you say your health is?: Very Good  In the past 7 days, have you experienced any of the following? New or Increased Pain, New or Increased Fatigue, Loneliness, Social Isolation, Stress or Anger?: (!) Loneliness  Do you get the social and emotional support that you need?: Yes  Do you have a Living Will?: (!) No  Advance Directives     Power of [address] Will ACP-Advance Directive ACP-Power of     Not on File Not on File Not on File Not on File      General Health Risk Interventions:  · Loneliness: patient's comments regarding inadequate social support: Patient states her  passed a couple of years ago, and she does get lonely at times, but has a very strong support in her family/friends  · No Living Will: [address] addressed with patient today    Health Habits/Nutrition:  Health Habits/Nutrition  Do you exercise for at least 20 minutes 2-3 times per week?: (!) No  Have you lost any weight without trying in the past 3 months?: No  Do you eat only one meal per day?: No  Have you seen the dentist within the past year?: Yes     Health Habits/Nutrition Interventions:  · Inadequate physical activity:  patient agrees to increase physical activity as follows: Patient states she used to go to the [facility] and walk before [facility].  Her plans are to begin using her stationary bike, [facility]     Safety:  Safety  Do you have working smoke detectors?: Yes  Have all throw rugs been removed or fastened?: (!) No  Do you have non-slip mats or surfaces in all bathtubs/showers?: Yes  Do all of your stairways have a railing or banister?: Yes  Are your doorways, halls and stairs free of clutter?: Yes  Do you always fasten your seatbelt when you are in a car?: Yes  Safety Interventions:  · Home safety tips provided verbally     Personalized Preventive Plan   Current Health Maintenance Status  Immunization History   Administered Date(s) Administered    COVID-19, Edmundo Michaels PF, 30mcg/0.3mL 01/23/2021, 02/17/2021    Influenza, High Dose (Fluzone 65 yrs and older) 10/14/2015, 11/18/2016, 10/23/2018    Influenza, Quadv, adjuvanted, 65 yrs +, IM, PF (Fluad) 10/14/2020    Influenza, Triv, inactivated, subunit, adjuvanted, IM (Fluad 65 yrs and older) 11/13/2019    Pneumococcal Conjugate 13-valent (Tawmosf01) 02/14/2017    Pneumococcal Polysaccharide (Aoxfyoote49) 08/03/2015    Zoster Live (Zostavax) 09/25/2014        Health Maintenance   Topic Date Due    DTaP/Tdap/Td vaccine (1 - Tdap) Never done    Shingles Vaccine (2 of 3) 11/20/2014    Annual Wellness Visit (AWV)  05/23/2021    Lipid screen  05/25/2022    TSH testing  05/25/2022    Potassium monitoring  05/25/2022    Creatinine monitoring  05/25/2022    Flu vaccine  Completed    Pneumococcal 65+ years Vaccine  Completed    COVID-19 Vaccine  Completed    DEXA (modify frequency per FRAX score)  Addressed    Hepatitis A vaccine  Aged Out    Hepatitis B vaccine  Aged Out    Hib vaccine  Aged Out    Meningococcal (ACWY) vaccine  Aged Out     Recommendations for Storee Due: see orders and patient instructions/AVS. Discussed need for tetanus and shingles vaccines. Patient will check at her pharmacy, as she does have the script for shingles. States PCP mentioned tetanus at her last office visit, and patient is okay with getting injection at her next office visit in August.    Unable to obtain 3 vital signs due to patient not having equipment to take blood pressure/temperature.     Recommended screening schedule for the next 5-10 years is provided to the patient in written form: see Patient Instructions/AVS.    Maldonado FELDMAN LPN, 8/32/[birth year]20, performed the documented evaluation under the direct supervision of the attending physician. Erin Paul, was evaluated through a synchronous (real-time) audio-video encounter. The patient (or guardian if applicable) is aware that this is a billable service. Verbal consent to proceed has been obtained within the past 12 months. The visit was conducted pursuant to the emergency declaration under the [address] [place] [address], [address] [place] [address] and the [employer] Act. Patient identification was verified, and a caregiver was present when appropriate. The patient was located in the state of [place], where the provider was credentialed to provide care. Total time spent for this encounter: Not billed by time    --Demetrio Mcdaniel LPN on 3/73/9787 at 0:68 AM    An electronic signature was used to authenticate this note.

## 2021-06-02 ENCOUNTER — TELEPHONE (OUTPATIENT)
Dept: INTERNAL MEDICINE CLINIC | Age: 81
End: 2021-06-02

## 2021-06-03 ENCOUNTER — OFFICE VISIT (OUTPATIENT)
Dept: CARDIOLOGY CLINIC | Age: 81
End: 2021-06-03
Payer: MEDICARE

## 2021-06-03 VITALS
WEIGHT: 137.6 LBS | SYSTOLIC BLOOD PRESSURE: 140 MMHG | HEART RATE: 56 BPM | HEIGHT: 63 IN | BODY MASS INDEX: 24.38 KG/M2 | DIASTOLIC BLOOD PRESSURE: 88 MMHG

## 2021-06-03 DIAGNOSIS — I49.3 PVC (PREMATURE VENTRICULAR CONTRACTION): ICD-10-CM

## 2021-06-03 DIAGNOSIS — R00.2 PALPITATIONS: Primary | ICD-10-CM

## 2021-06-03 PROCEDURE — G8427 DOCREV CUR MEDS BY ELIG CLIN: HCPCS | Performed by: INTERNAL MEDICINE

## 2021-06-03 PROCEDURE — G8420 CALC BMI NORM PARAMETERS: HCPCS | Performed by: INTERNAL MEDICINE

## 2021-06-03 PROCEDURE — 1090F PRES/ABSN URINE INCON ASSESS: CPT | Performed by: INTERNAL MEDICINE

## 2021-06-03 PROCEDURE — G8399 PT W/DXA RESULTS DOCUMENT: HCPCS | Performed by: INTERNAL MEDICINE

## 2021-06-03 PROCEDURE — 99214 OFFICE O/P EST MOD 30 MIN: CPT | Performed by: INTERNAL MEDICINE

## 2021-06-03 PROCEDURE — 1036F TOBACCO NON-USER: CPT | Performed by: INTERNAL MEDICINE

## 2021-06-03 PROCEDURE — 1123F ACP DISCUSS/DSCN MKR DOCD: CPT | Performed by: INTERNAL MEDICINE

## 2021-06-03 PROCEDURE — 4040F PNEUMOC VAC/ADMIN/RCVD: CPT | Performed by: INTERNAL MEDICINE

## 2021-06-03 NOTE — PROGRESS NOTES
Karolina Durham  is a  Established patient  ,78 y.o.   female here for evaluation of the following chief complaint(s):    Htn, PVCs        SUBJECTIVE/OBJECTIVE:  HPI : h/o  Htn, hyperlipidimea, PVCs now here  Has  cardiac complains  Of freq palpitations, has seen Dr Lyndsey Olivier for poss ablation but declined still thinking    Review of Systems    dizzy    Vitals:    06/03/21 1145   BP: (!) 140/88   Pulse: 56   Weight: 137 lb 9.6 oz (62.4 kg)   Height: 5' 3\" (1.6 m)     BP (!) 140/88   Pulse 56   Ht 5' 3\" (1.6 m)   Wt 137 lb 9.6 oz (62.4 kg)   BMI 24.37 kg/m²   No flowsheet data found. Wt Readings from Last 3 Encounters:   06/03/21 137 lb 9.6 oz (62.4 kg)   05/25/21 134 lb (60.8 kg)   04/28/21 138 lb 6.4 oz (62.8 kg)     Body mass index is 24.37 kg/m². Physical Exam     Neck: JVD      Lungs : clear    Cardio : Si and S2 audilble      Ext: edema      All pertinent data reviewed      Meds : reviewed         Tests ordered   EP consult       ASSESSMENT/PLAN:    - still with freq PVCs  Need to see EP Dw pt again, reconsult    -  Hypertension: Patients blood pressure is normal. Patient is advised about low sodium diet. Present medical regimen will not be changed. - arrythmias: has freq PVCs        -  LIPID MANAGEMENT:  Importance of lipid levels discussed with patient   and patient was given dietary advice. NCEP- ATP III guidelines reviewed with patient. -   Changes  in medicines made: No                                    An electronic signature was used to authenticate this note.     --Amira Wasserman MD

## 2021-06-03 NOTE — LETTER
Travon Early Constance  1940  [id number]    Have you had any Chest Pain that is not new? - No     DO EKG IF: Patient has a Heart Rate above 100 or below 40     CAD (Coronary Artery Disease) patient should have one on file every 6 months        Have you had any Shortness of Breath - No    Have you had any dizziness - No       Sitting wait 5 minutes do supine (laying down) wait 5 minutes then do standing - log each in \"vitals\" area in Epic   Be sure to ask what symptoms they are having if they get dizzy while completing ortho stats such as: room spinning, nausea, etc.    Have you had any palpitations that are not new? - No    Do you have any edema - swelling in No          When did you have your last labs drawn   Where did you have them done   What doctor ordered     If we do not have these labs you are retrieve these labs for these providers!     Do you have a surgery or procedure scheduled in the near future - No

## 2021-06-23 ENCOUNTER — INITIAL CONSULT (OUTPATIENT)
Dept: CARDIOLOGY CLINIC | Age: 81
End: 2021-06-23
Payer: MEDICARE

## 2021-06-23 VITALS
OXYGEN SATURATION: 96 % | HEART RATE: 59 BPM | DIASTOLIC BLOOD PRESSURE: 64 MMHG | BODY MASS INDEX: 23.92 KG/M2 | HEIGHT: 63 IN | WEIGHT: 135 LBS | RESPIRATION RATE: 20 BRPM | SYSTOLIC BLOOD PRESSURE: 124 MMHG

## 2021-06-23 DIAGNOSIS — I49.3 PVCS (PREMATURE VENTRICULAR CONTRACTIONS): Primary | ICD-10-CM

## 2021-06-23 DIAGNOSIS — R00.2 PALPITATIONS: ICD-10-CM

## 2021-06-23 PROCEDURE — G8420 CALC BMI NORM PARAMETERS: HCPCS | Performed by: INTERNAL MEDICINE

## 2021-06-23 PROCEDURE — 1036F TOBACCO NON-USER: CPT | Performed by: INTERNAL MEDICINE

## 2021-06-23 PROCEDURE — 4040F PNEUMOC VAC/ADMIN/RCVD: CPT | Performed by: INTERNAL MEDICINE

## 2021-06-23 PROCEDURE — 1090F PRES/ABSN URINE INCON ASSESS: CPT | Performed by: INTERNAL MEDICINE

## 2021-06-23 PROCEDURE — 93000 ELECTROCARDIOGRAM COMPLETE: CPT | Performed by: INTERNAL MEDICINE

## 2021-06-23 PROCEDURE — 1123F ACP DISCUSS/DSCN MKR DOCD: CPT | Performed by: INTERNAL MEDICINE

## 2021-06-23 PROCEDURE — G8427 DOCREV CUR MEDS BY ELIG CLIN: HCPCS | Performed by: INTERNAL MEDICINE

## 2021-06-23 PROCEDURE — 99203 OFFICE O/P NEW LOW 30 MIN: CPT | Performed by: INTERNAL MEDICINE

## 2021-06-23 PROCEDURE — G8399 PT W/DXA RESULTS DOCUMENT: HCPCS | Performed by: INTERNAL MEDICINE

## 2021-06-23 NOTE — PROGRESS NOTES
Electrophysiology Reconsult Note      Reason for consultation: PVC    Chief complaint :  Palpitations, dizziness    Referring physician:  Maddie Singh      Primary care physician: Brennan Yeboah MD      History of Present Illness: This visit (6/23/2021)      Chief Complaint   Patient presents with    New Patient     pt. here today for consult for PVC's see's Dr. Germain Pham. Pt. denies chest pain, SOB, edema. Pt. denies use of tobacco and drugs. Pt. drinks maybe once a day, not always everyday. Pt. rides stationary bike some at home and walks around at stores.  Palpitations     skipping on and off.  Dizziness     sometimes. Previous visit: (10/4/2017      Chief Complaint   Patient presents with    Tachycardia     Pt is here for VT. Pt states she woke up today with dizziness and nausea. Pt states it felt like her heart was fluttering. Pt denies chest pain, shortness of breath, and edema. Previous visit:    Chief Complaint   Patient presents with    Irregular Heart Beat     Pt here to see Rosa Salas for exercise-induced frequent complex Ventricular arrhythmia. Pt also had 30-day event monitor done in June 2017.  Chest Pain     Pt states her chest pain, dizziness, & palpitations are not everyday, just occas. Her chest pain feels like a \"fullnesss\" to mid-chest to under left breast, but does not radiate to any other areas. Pt had cardiac cath done per  on 8/3/17.            Past medical history:   Past Medical History:   Diagnosis Date    Acute deep vein thrombosis (DVT) of distal vein of left lower extremity ([address] 10/23/2016    DVT involving left peroneal, posterior and anterior tibial veins 10/16 Treated for at least 3 months and repeat venous doppler was negative and was discontinued     Axillary adenopathy 8/28/2014    US of axilla 6/14 showed benign lymph nodes    C. difficile diarrhea 11/18/2016    Treated few times and now is normal    CAD (coronary artery disease)     Chest discomfort 8/1/2017    Colitis 12/13    EGD,COLONOSCOPY, SBFT normal. Dr. Yashira Salmeron    Cyst of right ovary 9/6/2016    Seen Dr Aggie Murphy and had several US per pt.  Dizzy spells 6/5/2017    H/O 24 hour EKG monitoring 7/27/09    NSR, few PVCs and occasional couplet noted (total 1007 PVCs), no pauses noted.  H/O colonoscopy 10/09, 12/13    f/u in 5 years    H/O echocardiogram 9/17/12    Normal LV size and function, mild mitral and tricuspid regurg., EF 60%.  H/O mitral valve prolapse 2003    MR mild    H/O myocardial perfusion scan 12/16/14    Stress Cardiolite. Normal perfusion in the distribution of all coronaries, normal LV size and function, EF 70%.  H/O screening mammography 8/11    Dr Chris Sloan History of cardiac monitoring 06/05/2017    Abnormal-SR with complex ventricular arrhythmias and frequent PVCs. No symptoms during marked bradycardia or during the fastest rate of 143 with nonsustained three-beat runs of ventricular tachycardia.  History of cardiac monitoring 08/23/2017    Abnormal event monitor findings suggestive of predominantly sinus bradycardia with isolated and frequent low-grade ventricular ectopy with symptoms reported.      Hx of Doppler ultrasound 10/17/2017    duplex venous doppler-minimal reflux noted in the left CFV    Hyperlipidemia     Hypertension     Hypothyroidism     Liver dysfunction     liver bx 10/09,mild steaotosis    Osteoarthritis of cervical spine 6/7/2016    Ovarian cyst     Papanicolaou smear 09/14/2016    Dr. Chris Sloan PVCs (premature ventricular contractions)     symptomatic frequent PVCs, sees Dr. Gee Joiner Ventricular tachycardia ([facility] [address] 8/1/2017    Exercise induced 8/1/17       Surgical history :   Past Surgical History:   Procedure Laterality Date    APPENDECTOMY      BREAST BIOPSY      CATARACT REMOVAL Bilateral     COLONOSCOPY  12/3/13,2-28-17 2.28.17 no need for follow up due to pt's advanced age per Dr. Wade Duggan.  DIAGNOSTIC CARDIAC CATH LAB PROCEDURE  08/03/2017    Normal coronaries, EF 70%.  HYSTERECTOMY  1961    LIVER BIOPSY  10/09    steasosis    UPPER GASTROINTESTINAL ENDOSCOPY  11/02/2017    Dr. Wade Duggan       Family history:   Family History   Problem Relation Age of Onset    Heart Surgery Mother         CABG    Rheum Arthritis Mother     Osteoarthritis Mother     Hypertension Mother     High Cholesterol Mother     Migraines Mother     Stroke Mother     Heart Disease Mother     Heart Disease Brother     Heart Attack Father     Heart Disease Father     Heart Failure Maternal Grandmother     Heart Disease Brother        Social history :  reports that she has never smoked. She has never used smokeless tobacco. She reports current alcohol use. She reports that she does not use drugs. Allergies   Allergen Reactions    Amiodarone      Severe hair loss    Amoxicillin Other (See Comments)     Pt states she got C-diff. , so she doesn't like atb       Current Outpatient Medications on File Prior to Visit   Medication Sig Dispense Refill    atorvastatin (LIPITOR) 10 MG tablet TAKE 1 TABLET EVERY DAY 90 tablet 1    benazepril (LOTENSIN) 20 MG tablet TAKE 1 TABLET BY MOUTH DAILY 90 tablet 1    levothyroxine (SYNTHROID) 50 MCG tablet TAKE 1 TABLET BY MOUTH DAILY 90 tablet 1    aspirin 81 MG tablet Take 81 mg by mouth daily Take 2 tabs by mouth in am and one tab at hs      Magnesium 125 MG CAPS Take by mouth daily      lactobacillus (CULTURELLE) capsule Take 1 capsule by mouth 2 times daily (with meals) 30 capsule 0    Coenzyme Q10 (CO Q 10) 100 MG CAPS Take by mouth daily       Cholecalciferol (VITAMIN D3) 2000 UNITS CAPS Take 1 capsule by mouth daily       Multiple Vitamins-Minerals (MULTIVITAMIN & MINERAL PO) Take 1 tablet by mouth daily      calcium carbonate (OSCAL) 500 MG TABS tablet Take 500 mg by mouth daily       No current facility-administered medications on file prior to visit. Review of Systems:   Review of Systems   Constitutional: Negative for activity change, chills, and fever. tiredness and fatigue  HENT: Negative for congestion, ear pain and tinnitus. Eyes: Negative for photophobia, pain and visual disturbance. Respiratory: Negative for cough, chest tightness, shortness of breath and wheezing. Cardiovascular: Positive for palpitations. denies chest pain  Gastrointestinal: Negative for abdominal pain, blood in stool, constipation, diarrhea, nausea and vomiting. Endocrine: Negative for cold intolerance and heat intolerance. Genitourinary: Negative for dysuria, flank pain and hematuria. Musculoskeletal: Positive for arthralgias. Negative for back pain, myalgias and neck stiffness. Skin: Negative for color change and rash. Allergic/Immunologic: Negative for food allergies. Neurological: Negative for numbness and headaches. Occasional dizziness  Hematological: Does not bruise/bleed easily. Psychiatric/Behavioral: Negative for agitation, behavioral problems and confusion. Physical Examination:    There were no vitals taken for this visit. Wt Readings from Last 3 Encounters:   06/03/21 137 lb 9.6 oz (62.4 kg)   05/25/21 134 lb (60.8 kg)   04/28/21 138 lb 6.4 oz (62.8 kg)     There is no height or weight on file to calculate BMI. Physical Exam   Constitutional: She is oriented to person, place, and time and well-developed, well-nourished, and in no distress. HENT:   Head: Normocephalic and atraumatic. Eyes: Conjunctivae and EOM are normal. Pupils are equal, round, and reactive to light. Right eye exhibits no discharge. Neck: Normal range of motion. No JVD present. No thyromegaly present. Cardiovascular: Normal rate, regular rhythm and normal heart sounds. Exam reveals no friction rub. No murmur heard. Pulmonary/Chest: Effort normal and breath sounds normal. No stridor. No respiratory distress. She has no wheezes. Abdominal: Soft. Bowel sounds are normal. She exhibits no distension. There is no tenderness. Musculoskeletal: Normal range of motion. She exhibits no edema or tenderness. Neurological: She is alert and oriented to person, place, and time. She displays normal reflexes. No cranial nerve deficit. Coordination normal.   Skin: Skin is warm and dry. No rash noted. No erythema. Psychiatric: Mood and affect normal.         CBC:   Lab Results   Component Value Date    WBC 7.1 05/19/2020    HGB 13.3 05/19/2020    HCT 39.2 05/19/2020     05/19/2020     Lipids:   Lab Results   Component Value Date    CHOL 137 02/11/2017    TRIG 62 02/11/2017    HDL 65 (H) 05/25/2021    LDLCALC 54 05/25/2021    LDLDIRECT 64 12/04/2020     PT/INR:   Lab Results   Component Value Date    INR 0.91 10/17/2017        BMP:    Lab Results   Component Value Date     05/25/2021    K 4.8 05/25/2021     05/25/2021    CO2 23 05/25/2021    BUN 23 (H) 05/25/2021     CMP:   Lab Results   Component Value Date    AST 28 05/25/2021    PROT 6.9 05/25/2021    BILITOT 0.5 05/25/2021    ALKPHOS 66 05/25/2021     TSH:    Lab Results   Component Value Date    TSH 1.22 05/25/2021       EKGINTERPRETATION - EKG Interpretation:  Sinus bradycardia, normal QT      IMPRESSION / RECOMMENDATIONS:     1. Exercise induced VT and PVCs  2. htn  3. hld  4. Bradycardia      Patient was scheduled for ablation before but she did not want to get it done as she was scared. Patient still having episodes and still symptomatic     Patient though denies any dizziness (Occasional dizziness when she gets up suddenly) or syncope. Patient had could not tolerate beta-blockers before because of bradycardia and almost near syncopal.  Patient could not tolerate amiodarone because of hair loss.      Patient does not want to be on the medication - she is only on magnesium currently, Patient LVEF is normal     discussed with the patient about warning signs and if she continues to have this

## 2021-08-25 ENCOUNTER — OFFICE VISIT (OUTPATIENT)
Dept: INTERNAL MEDICINE CLINIC | Age: 81
End: 2021-08-25
Payer: MEDICARE

## 2021-08-25 VITALS
OXYGEN SATURATION: 94 % | WEIGHT: 136 LBS | BODY MASS INDEX: 24.09 KG/M2 | DIASTOLIC BLOOD PRESSURE: 60 MMHG | HEART RATE: 60 BPM | SYSTOLIC BLOOD PRESSURE: 102 MMHG

## 2021-08-25 DIAGNOSIS — Z86.79 H/O MITRAL VALVE PROLAPSE: ICD-10-CM

## 2021-08-25 DIAGNOSIS — R00.2 HEART PALPITATIONS: ICD-10-CM

## 2021-08-25 DIAGNOSIS — E78.2 MIXED HYPERLIPIDEMIA: ICD-10-CM

## 2021-08-25 DIAGNOSIS — E03.9 ACQUIRED HYPOTHYROIDISM: ICD-10-CM

## 2021-08-25 DIAGNOSIS — I49.3 PVCS (PREMATURE VENTRICULAR CONTRACTIONS): ICD-10-CM

## 2021-08-25 DIAGNOSIS — I10 ESSENTIAL HYPERTENSION: ICD-10-CM

## 2021-08-25 PROCEDURE — G8420 CALC BMI NORM PARAMETERS: HCPCS | Performed by: INTERNAL MEDICINE

## 2021-08-25 PROCEDURE — G8427 DOCREV CUR MEDS BY ELIG CLIN: HCPCS | Performed by: INTERNAL MEDICINE

## 2021-08-25 PROCEDURE — G8399 PT W/DXA RESULTS DOCUMENT: HCPCS | Performed by: INTERNAL MEDICINE

## 2021-08-25 PROCEDURE — 1123F ACP DISCUSS/DSCN MKR DOCD: CPT | Performed by: INTERNAL MEDICINE

## 2021-08-25 PROCEDURE — 1090F PRES/ABSN URINE INCON ASSESS: CPT | Performed by: INTERNAL MEDICINE

## 2021-08-25 PROCEDURE — 4040F PNEUMOC VAC/ADMIN/RCVD: CPT | Performed by: INTERNAL MEDICINE

## 2021-08-25 PROCEDURE — 99214 OFFICE O/P EST MOD 30 MIN: CPT | Performed by: INTERNAL MEDICINE

## 2021-08-25 PROCEDURE — 1036F TOBACCO NON-USER: CPT | Performed by: INTERNAL MEDICINE

## 2021-08-25 RX ORDER — ATORVASTATIN CALCIUM 10 MG/1
TABLET, FILM COATED ORAL
Qty: 90 TABLET | Refills: 1 | Status: CANCELLED | OUTPATIENT
Start: 2021-08-25

## 2021-08-25 RX ORDER — LACTOBACILLUS RHAMNOSUS GG 10B CELL
1 CAPSULE ORAL 2 TIMES DAILY WITH MEALS
Qty: 30 CAPSULE | Refills: 0 | Status: CANCELLED | OUTPATIENT
Start: 2021-08-25

## 2021-08-25 RX ORDER — LEVOTHYROXINE SODIUM 0.05 MG/1
TABLET ORAL
Qty: 90 TABLET | Refills: 1 | Status: SHIPPED | OUTPATIENT
Start: 2021-08-25 | End: 2021-11-23 | Stop reason: SDUPTHER

## 2021-08-25 RX ORDER — BENAZEPRIL HYDROCHLORIDE 10 MG/1
10 TABLET ORAL DAILY
Qty: 30 TABLET | Refills: 2 | Status: SHIPPED | OUTPATIENT
Start: 2021-08-25 | End: 2021-09-16

## 2021-08-25 ASSESSMENT — ENCOUNTER SYMPTOMS
GASTROINTESTINAL NEGATIVE: 1
ALLERGIC/IMMUNOLOGIC NEGATIVE: 1
RESPIRATORY NEGATIVE: 1
EYES NEGATIVE: 1

## 2021-08-25 NOTE — PROGRESS NOTES
Nevaeh Jay  Patient's  is 1940  Seen in office on 2021      SUBJECTIVE:  Demetra Miner keena 78 y. o.year old female presents today   Chief Complaint   Patient presents with    3 Month Follow-Up     Pt BP is low at home and no dizziness  Has appt with Dr Dedra Hicks well. Patient has hypertension. Taking medications No headaches, no chest pain, no palpitations and no dizziness. Patient has hyperlipidemia. Taking medications. No abdominal pain, no nausea or vomiting. No myalgias. No palpitations no HA  No GERD symptoms. Not taking any med     Taking medications regularly. No side effects noted. Review of Systems   Constitutional: Negative. HENT: Negative. Eyes: Negative. Respiratory: Negative. Cardiovascular: Negative. Negative for chest pain, palpitations and leg swelling. Gastrointestinal: Negative. Endocrine: Negative. Genitourinary: Negative. Musculoskeletal: Negative. Skin: Negative. Allergic/Immunologic: Negative. Neurological: Negative. Hematological: Negative. Psychiatric/Behavioral: Negative. OBJECTIVE: /60   Pulse 60   Wt 136 lb (61.7 kg)   SpO2 94%   BMI 24.09 kg/m²     Wt Readings from Last 3 Encounters:   21 136 lb (61.7 kg)   21 135 lb (61.2 kg)   21 137 lb 9.6 oz (62.4 kg)        Patient was seen taking COVID-19 precautions. Face mask, gloves were used. Patient also wore facemask. GENERAL: - Alert, oriented, pleasant, in no apparent distress. HEENT: - Conjunctiva pink, no scleral icterus. ENT clear. NECK: -Supple. No jugular venous distention noted. No masses felt,  CARDIOVASCULAR: - Normal S1 and S2    PULMONARY: - No respiratory distress. No wheezes or rales. ABDOMEN: - Soft and non-tender,no masses  ororganomegaly. EXTREMITIES: - No cyanosis, clubbing, or significant edema. SKIN: Skin is warm and dry. NEUROLOGICAL: - Cranial nerves II through XII are grossly intact.       IMPRESSION:    Encounter Diagnoses   Name Primary?  Essential hypertension     Mixed hyperlipidemia     PVCs (premature ventricular contractions)     H/O mitral valve prolapse     Acquired hypothyroidism     Heart palpitations        ASSESSMENT/PLAN:    Patient Active Problem List    Diagnosis Date Noted   Sanket Morejon  GERD :       Pt had EGD done in 11/17 : stopped taking PPI  Doing well with out it      Ventricular tachycardia ([address] 08/01/2017     Exercise induced 8/1/17Cath normal coronories  Referred to Dr Jessica Barrios. Recommended EP study  Has appointment with Dr Jeevan Lang palpitations 11/20/2014     Pt has PAC and PVC in the past.   30 event monitor : nothing significant. Has cardiology appt. Sees Dr Armando Francisco and Dr Jessica Barrios       Colitis 11/14/2013     Dr. Nena Alba saw patient . Had colonosocpy 11/13 and SBFT   Pt takes bentyl prn. Has not used it for some time.  Essential hypertension      Patient has HTN for > 10 years  Patient is on benazepril 20 mg daily  BP low at home  Today it is 102/60  Decrease benazepril to 10 mg daily       Mixed hyperlipidemia      Patient on lipitor 10 mg daily  for hyperlipidemia  Lipid profile is good on 5/25/21       PVCs (premature ventricular contractions)      asymptomatic frequent PVCs as per 1/2016 event monitor  Pt was on amiodarone therapy but she stopped it in 5/15. Dr. Polo Sebastian knows that. VT and PVCs on stress test:   Exercise induced 8/1/17  Cath normal coronories  Referred to Dr Jessica Barrios. Recommended EP study. Pt declined. Pt has seen [facility] for 2nd opinion and Dr Inocencia Kim did not recommend EP study  Has appointment with Dr Jessica Barrios in 9/2021       H/O mitral valve prolapse      MR mild      Liver dysfunction      liver bx 10/09,mild steaotosis  Liver function much better       Acquired hypothyroidism      Pt is taking synthroid 50 mcg daily       Return to office in 3 months. Mediations reviewed with the patient. Continue current medications.   Appropriate prescriptions are addressed. After visit summeryprovided. Follow up as directed sooner if needed. Questions answered and patient verbalizes understanding. Call for any problems, questions, or concerns. Allergies   Allergen Reactions    Amiodarone      Severe hair loss    Amoxicillin Other (See Comments)     Pt states she got C-diff. , so she doesn't like atb     Current Outpatient Medications   Medication Sig Dispense Refill    atorvastatin (LIPITOR) 10 MG tablet TAKE 1 TABLET EVERY DAY 90 tablet 1    benazepril (LOTENSIN) 20 MG tablet TAKE 1 TABLET BY MOUTH DAILY 90 tablet 1    levothyroxine (SYNTHROID) 50 MCG tablet TAKE 1 TABLET BY MOUTH DAILY 90 tablet 1    aspirin 81 MG tablet Take 81 mg by mouth daily Take 2 tabs by mouth in am and one tab at hs      Magnesium 125 MG CAPS Take by mouth daily      lactobacillus (CULTURELLE) capsule Take 1 capsule by mouth 2 times daily (with meals) 30 capsule 0    Coenzyme Q10 (CO Q 10) 100 MG CAPS Take by mouth daily       Cholecalciferol (VITAMIN D3) 2000 UNITS CAPS Take 1 capsule by mouth daily       Multiple Vitamins-Minerals (MULTIVITAMIN & MINERAL PO) Take 1 tablet by mouth daily      calcium carbonate (OSCAL) 500 MG TABS tablet Take 500 mg by mouth daily       No current facility-administered medications for this visit.      Past Medical History:   Diagnosis Date    Acute deep vein thrombosis (DVT) of distal vein of left lower extremity ([address] 10/23/2016    DVT involving left peroneal, posterior and anterior tibial veins 10/16 Treated for at least 3 months and repeat venous doppler was negative and was discontinued     Axillary adenopathy 8/28/2014    US of axilla 6/14 showed benign lymph nodes    C. difficile diarrhea 11/18/2016    Treated few times and now is normal    CAD (coronary artery disease)     Chest discomfort 8/1/2017    Colitis 12/13    EGD,COLONOSCOPY, SBFT normal. Dr. Haris Bhat    Cyst of right ovary 9/6/2016    Seen Dr Patricia Feliz and had several US per pt.  Dizzy spells 6/5/2017    H/O 24 hour EKG monitoring 7/27/09    NSR, few PVCs and occasional couplet noted (total 1007 PVCs), no pauses noted.  H/O colonoscopy 10/09, 12/13    f/u in 5 years    H/O echocardiogram 9/17/12    Normal LV size and function, mild mitral and tricuspid regurg., EF 60%.  H/O mitral valve prolapse 2003    MR mild    H/O myocardial perfusion scan 12/16/14    Stress Cardiolite. Normal perfusion in the distribution of all coronaries, normal LV size and function, EF 70%.  H/O screening mammography 8/11    Dr Nayla Ramirez History of cardiac monitoring 06/05/2017    Abnormal-SR with complex ventricular arrhythmias and frequent PVCs. No symptoms during marked bradycardia or during the fastest rate of 143 with nonsustained three-beat runs of ventricular tachycardia.  History of cardiac monitoring 08/23/2017    Abnormal event monitor findings suggestive of predominantly sinus bradycardia with isolated and frequent low-grade ventricular ectopy with symptoms reported.  Hx of Doppler ultrasound 10/17/2017    duplex venous doppler-minimal reflux noted in the left CFV    Hyperlipidemia     Hypertension     Hypothyroidism     Liver dysfunction     liver bx 10/09,mild steaotosis    Osteoarthritis of cervical spine 6/7/2016    Ovarian cyst     Papanicolaou smear 09/14/2016    Dr. Nayla Ramirez PVCs (premature ventricular contractions)     symptomatic frequent PVCs, sees Dr. Leigha Levi Ventricular tachycardia ([facility] [address] 8/1/2017    Exercise induced 8/1/17     Past Surgical History:   Procedure Laterality Date    APPENDECTOMY      BREAST BIOPSY      CATARACT REMOVAL Bilateral     COLONOSCOPY  12/3/13,2-28-17    2.28.17 no need for follow up due to pt's advanced age per Dr. Colin Brothers.  DIAGNOSTIC CARDIAC CATH LAB PROCEDURE  08/03/2017    Normal coronaries, EF 70%.    [address]    LIVER BIOPSY  10/09    steasosis    UPPER GASTROINTESTINAL ENDOSCOPY  11/02/2017 Dr. Landeros Rota History     Tobacco Use    Smoking status: Never Smoker    Smokeless tobacco: Never Used   Substance Use Topics    Alcohol use:  Yes     Alcohol/week: 0.0 standard drinks     Comment:  1 beers per day or 2 glasses of \"bubbly\"/day       LAB REVIEW:  CBC:   Lab Results   Component Value Date    WBC 7.1 05/19/2020    HGB 13.3 05/19/2020    HCT 39.2 05/19/2020     05/19/2020     Lipids:   Lab Results   Component Value Date    HDL 65 (H) 05/25/2021    LDLCALC 54 05/25/2021    LDLDIRECT 64 12/04/2020    TRIGLYCFAST 61 05/25/2021    CHOLFAST 131 05/25/2021     Renal:   Lab Results   Component Value Date    BUN 23 05/25/2021    CREATININE 0.9 05/25/2021     05/25/2021    K 4.8 05/25/2021    ALT 20 05/25/2021    AST 28 05/25/2021    GLUCOSE 94 05/25/2021    GLUF 95 12/04/2020     PT/INR:   Lab Results   Component Value Date    INR 0.91 10/17/2017     A1C:   Lab Results   Component Value Date    LABA1C 5.5 11/13/2019           Cassidy Martinez MD, 8/25/2021 , 9:54 AM

## 2021-09-17 RX ORDER — BENAZEPRIL HYDROCHLORIDE 10 MG/1
TABLET ORAL
Qty: 30 TABLET | Refills: 2 | Status: SHIPPED | OUTPATIENT
Start: 2021-09-17 | End: 2021-11-15

## 2021-09-24 ENCOUNTER — OFFICE VISIT (OUTPATIENT)
Dept: CARDIOLOGY CLINIC | Age: 81
End: 2021-09-24
Payer: MEDICARE

## 2021-09-24 VITALS
HEART RATE: 94 BPM | DIASTOLIC BLOOD PRESSURE: 80 MMHG | WEIGHT: 137 LBS | HEIGHT: 63 IN | SYSTOLIC BLOOD PRESSURE: 128 MMHG | BODY MASS INDEX: 24.27 KG/M2

## 2021-09-24 DIAGNOSIS — I49.3 PVCS (PREMATURE VENTRICULAR CONTRACTIONS): Primary | ICD-10-CM

## 2021-09-24 PROCEDURE — 1123F ACP DISCUSS/DSCN MKR DOCD: CPT | Performed by: INTERNAL MEDICINE

## 2021-09-24 PROCEDURE — 1090F PRES/ABSN URINE INCON ASSESS: CPT | Performed by: INTERNAL MEDICINE

## 2021-09-24 PROCEDURE — G8399 PT W/DXA RESULTS DOCUMENT: HCPCS | Performed by: INTERNAL MEDICINE

## 2021-09-24 PROCEDURE — 99214 OFFICE O/P EST MOD 30 MIN: CPT | Performed by: INTERNAL MEDICINE

## 2021-09-24 PROCEDURE — 93000 ELECTROCARDIOGRAM COMPLETE: CPT | Performed by: INTERNAL MEDICINE

## 2021-09-24 PROCEDURE — 4040F PNEUMOC VAC/ADMIN/RCVD: CPT | Performed by: INTERNAL MEDICINE

## 2021-09-24 PROCEDURE — G8420 CALC BMI NORM PARAMETERS: HCPCS | Performed by: INTERNAL MEDICINE

## 2021-09-24 PROCEDURE — 1036F TOBACCO NON-USER: CPT | Performed by: INTERNAL MEDICINE

## 2021-09-24 PROCEDURE — G8427 DOCREV CUR MEDS BY ELIG CLIN: HCPCS | Performed by: INTERNAL MEDICINE

## 2021-09-24 NOTE — PROGRESS NOTES
Electrophysiology FU Note      Reason for consultation: PVC    Chief complaint :   3 MONTH FOLLOW UP    Referring physician:  Mirella De Los Santos      Primary care physician: Josh Grey MD      History of Present Illness: This visit (9/24/2021)      Chief Complaint   Patient presents with   Alice Rao patient here for 3 month f/u. Patient denies Chest Pain, Palpitations, SOB, Dizziness, Edema. Patient does not smoke and drinks alcohol occ and does not drink caffiene. Patient does exercise daily. Previous visit:(6/23/2021)      Chief Complaint   Patient presents with    New Patient     pt. here today for consult for PVC's see's Dr. Viktoria Rao. Pt. denies chest pain, SOB, edema. Pt. denies use of tobacco and drugs. Pt. drinks maybe once a day, not always everyday. Pt. rides stationary bike some at home and walks around at stores.  Palpitations     skipping on and off.  Dizziness     sometimes. Previous visit: (10/4/2017      Chief Complaint   Patient presents with    Tachycardia     Pt is here for VT. Pt states she woke up today with dizziness and nausea. Pt states it felt like her heart was fluttering. Pt denies chest pain, shortness of breath, and edema. Previous visit:    Chief Complaint   Patient presents with    Irregular Heart Beat     Pt here to see Joni Seymour for exercise-induced frequent complex Ventricular arrhythmia. Pt also had 30-day event monitor done in June 2017.  Chest Pain     Pt states her chest pain, dizziness, & palpitations are not everyday, just occas. Her chest pain feels like a \"fullnesss\" to mid-chest to under left breast, but does not radiate to any other areas. Pt had cardiac cath done per  on 8/3/17.            Past medical history:   Past Medical History:   Diagnosis Date    Acute deep vein thrombosis (DVT) of distal vein of left lower extremity ([address] 10/23/2016    DVT involving left peroneal, posterior and anterior tibial veins 10/16 Treated for at least 3 months and repeat venous doppler was negative and was discontinued     Axillary adenopathy 8/28/2014    US of axilla 6/14 showed benign lymph nodes    C. difficile diarrhea 11/18/2016    Treated few times and now is normal    CAD (coronary artery disease)     Chest discomfort 8/1/2017    Colitis 12/13    EGD,COLONOSCOPY, SBFT normal. Dr. Carla Tristan    Cyst of right ovary 9/6/2016    Seen Dr Max Vargas and had several US per pt.  Dizzy spells 6/5/2017    H/O 24 hour EKG monitoring 7/27/09    NSR, few PVCs and occasional couplet noted (total 1007 PVCs), no pauses noted.  H/O colonoscopy 10/09, 12/13    f/u in 5 years    H/O echocardiogram 9/17/12    Normal LV size and function, mild mitral and tricuspid regurg., EF 60%.  H/O mitral valve prolapse 2003    MR mild    H/O myocardial perfusion scan 12/16/14    Stress Cardiolite. Normal perfusion in the distribution of all coronaries, normal LV size and function, EF 70%.  H/O screening mammography 8/11    Dr Shilpa Lam History of cardiac monitoring 06/05/2017    Abnormal-SR with complex ventricular arrhythmias and frequent PVCs. No symptoms during marked bradycardia or during the fastest rate of 143 with nonsustained three-beat runs of ventricular tachycardia.  History of cardiac monitoring 08/23/2017    Abnormal event monitor findings suggestive of predominantly sinus bradycardia with isolated and frequent low-grade ventricular ectopy with symptoms reported.      Hx of Doppler ultrasound 10/17/2017    duplex venous doppler-minimal reflux noted in the left CFV    Hyperlipidemia     Hypertension     Hypothyroidism     Liver dysfunction     liver bx 10/09,mild steaotosis    Osteoarthritis of cervical spine 6/7/2016    Ovarian cyst     Papanicolaou smear 09/14/2016    Dr. Shilpa Lam PVCs (premature ventricular contractions)     symptomatic frequent PVCs, sees Dr. Wayne Naik Ventricular tachycardia ([facility] [address] 8/1/2017    Exercise induced 8/1/17       Surgical history :   Past Surgical History:   Procedure Laterality Date    APPENDECTOMY      BREAST BIOPSY      CATARACT REMOVAL Bilateral     COLONOSCOPY  12/3/13,2-28-17 2.28.17 no need for follow up due to pt's advanced age per Dr. Loraine Flores.  DIAGNOSTIC CARDIAC CATH LAB PROCEDURE  08/03/2017    Normal coronaries, EF 70%.  HYSTERECTOMY  1961    LIVER BIOPSY  10/09    steasosis    UPPER GASTROINTESTINAL ENDOSCOPY  11/02/2017    Dr. Loraine Flores       Family history:   Family History   Problem Relation Age of Onset    Heart Surgery Mother         CABG    Rheum Arthritis Mother     Osteoarthritis Mother     Hypertension Mother     High Cholesterol Mother     Migraines Mother     Stroke Mother     Heart Disease Mother     Heart Disease Brother     Heart Attack Father     Heart Disease Father     Heart Failure Maternal Grandmother     Heart Disease Brother        Social history :  reports that she has never smoked. She has never used smokeless tobacco. She reports current alcohol use. She reports that she does not use drugs. Allergies   Allergen Reactions    Amiodarone      Severe hair loss    Amoxicillin Other (See Comments)     Pt states she got C-diff. , so she doesn't like atb       Current Outpatient Medications on File Prior to Visit   Medication Sig Dispense Refill    benazepril (LOTENSIN) 10 MG tablet TAKE 1 TABLET BY MOUTH EVERY DAY 30 tablet 2    levothyroxine (SYNTHROID) 50 MCG tablet TAKE 1 TABLET BY MOUTH DAILY 90 tablet 1    atorvastatin (LIPITOR) 10 MG tablet TAKE 1 TABLET EVERY DAY 90 tablet 1    aspirin 81 MG tablet Take 81 mg by mouth daily Take 2 tabs by mouth in am and one tab at hs      Magnesium 125 MG CAPS Take by mouth daily      lactobacillus (CULTURELLE) capsule Take 1 capsule by mouth 2 times daily (with meals) 30 capsule 0    Coenzyme Q10 (CO Q 10) 100 MG CAPS Take by mouth daily       Cholecalciferol (VITAMIN D3) 2000 UNITS CAPS Take 1 capsule by mouth daily       Multiple Vitamins-Minerals (MULTIVITAMIN & MINERAL PO) Take 1 tablet by mouth daily      calcium carbonate (OSCAL) 500 MG TABS tablet Take 500 mg by mouth daily       No current facility-administered medications on file prior to visit. Review of Systems:   Review of Systems   Constitutional: Negative for activity change, chills, and fever. tiredness and fatigue  HENT: Negative for congestion, ear pain and tinnitus. Eyes: Negative for photophobia, pain and visual disturbance. Respiratory: Negative for cough, chest tightness, shortness of breath and wheezing. Cardiovascular: Denies palpitations denies chest pain  Gastrointestinal: Negative for abdominal pain, blood in stool, constipation, diarrhea, nausea and vomiting. Endocrine: Negative for cold intolerance and heat intolerance. Genitourinary: Negative for dysuria, flank pain and hematuria. Musculoskeletal: Positive for arthralgias. Negative for back pain, myalgias and neck stiffness. Skin: Negative for color change and rash. Allergic/Immunologic: Negative for food allergies. Neurological: Negative for numbness and headaches. Occasional dizziness  Hematological: Does not bruise/bleed easily. Psychiatric/Behavioral: Negative for agitation, behavioral problems and confusion. Physical Examination:    /80   Pulse 94   Ht 5' 3\" (1.6 m)   Wt 137 lb (62.1 kg)   BMI 24.27 kg/m²    Wt Readings from Last 3 Encounters:   09/24/21 137 lb (62.1 kg)   08/25/21 136 lb (61.7 kg)   06/23/21 135 lb (61.2 kg)     Body mass index is 24.27 kg/m². Physical Exam   Constitutional: She is oriented to person, place, and time and well-developed, well-nourished, and in no distress. HENT:   Head: Normocephalic and atraumatic. Eyes: Conjunctivae and EOM are normal. Pupils are equal, round, and reactive to light. Right eye exhibits no discharge.    Neck: Normal range of motion. No JVD present. No thyromegaly present. Cardiovascular: Normal rate, regular rhythm and normal heart sounds. Exam reveals no friction rub. No murmur heard. Pulmonary/Chest: Effort normal and breath sounds normal. No stridor. No respiratory distress. She has no wheezes. Abdominal: Soft. Bowel sounds are normal. She exhibits no distension. There is no tenderness. Musculoskeletal: Normal range of motion. She exhibits no edema or tenderness. Neurological: She is alert and oriented to person, place, and time. She displays normal reflexes. No cranial nerve deficit. Coordination normal.   Skin: Skin is warm and dry. No rash noted. No erythema. Psychiatric: Mood and affect normal.         CBC:   Lab Results   Component Value Date    WBC 7.1 05/19/2020    HGB 13.3 05/19/2020    HCT 39.2 05/19/2020     05/19/2020     Lipids:   Lab Results   Component Value Date    CHOL 137 02/11/2017    TRIG 62 02/11/2017    HDL 65 (H) 05/25/2021    LDLCALC 54 05/25/2021    LDLDIRECT 64 12/04/2020     PT/INR:   Lab Results   Component Value Date    INR 0.91 10/17/2017        BMP:    Lab Results   Component Value Date     05/25/2021    K 4.8 05/25/2021     05/25/2021    CO2 23 05/25/2021    BUN 23 (H) 05/25/2021     CMP:   Lab Results   Component Value Date    AST 28 05/25/2021    PROT 6.9 05/25/2021    BILITOT 0.5 05/25/2021    ALKPHOS 66 05/25/2021     TSH:    Lab Results   Component Value Date    TSH 1.22 05/25/2021       EKGINTERPRETATION - EKG Interpretation:  Sinus rhythm frequent PVC      IMPRESSION / RECOMMENDATIONS:     1. Exercise induced VT and PVCs  2. htn  3. hld  4. Bradycardia    Patient has been walking into the clinic has PVCs and nonsustained short run of four beats. Discussed with the patient about the importance of considering therapy for this kind of rhythm issue. Patient though is very stressed about that just talking about it so I tried to calm her down.   I explained her in detail. Patient reports she is not having any symptoms and she is doing well she is taking her magnesium. Discussed with the patient if she does not want to do ablation of please consider something like sotalol which could be reasonable. Patient wants it written down so I wrote down the medication name and she is going to research on it think about it and let us know. She understands all the risks about waiting. We will follow her in 4 months or earlier with symptoms. She understands all the risks. I explained her several times before too    Patient still wants to keep the follow-up as described I would not be changing any recommendations what I have done but she reports that she likes me and she likes to talk and discuss and she wants to keep the follow-ups. But meanwhile she will think about the medication versus ablation and will let us know if she changes her mind    Discussed with the patient to call us back when she is ready to discuss medications vs ablation    Discussed to avoid strenuous activity      Thanks again for allowing me to participate in care of this patient. Please call me if you have any questions. With best regards.       Babatunde Blair MD

## 2021-11-15 RX ORDER — BENAZEPRIL HYDROCHLORIDE 10 MG/1
TABLET ORAL
Qty: 30 TABLET | Refills: 2 | Status: SHIPPED | OUTPATIENT
Start: 2021-11-15 | End: 2022-02-23 | Stop reason: SDUPTHER

## 2021-11-23 ENCOUNTER — OFFICE VISIT (OUTPATIENT)
Dept: INTERNAL MEDICINE CLINIC | Age: 81
End: 2021-11-23
Payer: MEDICARE

## 2021-11-23 VITALS
TEMPERATURE: 98.3 F | RESPIRATION RATE: 16 BRPM | HEART RATE: 72 BPM | WEIGHT: 133.2 LBS | DIASTOLIC BLOOD PRESSURE: 72 MMHG | SYSTOLIC BLOOD PRESSURE: 130 MMHG | BODY MASS INDEX: 23.6 KG/M2 | OXYGEN SATURATION: 95 %

## 2021-11-23 DIAGNOSIS — K52.9 COLITIS: ICD-10-CM

## 2021-11-23 DIAGNOSIS — Z86.79 H/O MITRAL VALVE PROLAPSE: ICD-10-CM

## 2021-11-23 DIAGNOSIS — I47.20 VENTRICULAR TACHYCARDIA: ICD-10-CM

## 2021-11-23 DIAGNOSIS — E78.2 MIXED HYPERLIPIDEMIA: ICD-10-CM

## 2021-11-23 DIAGNOSIS — N83.201 CYST OF RIGHT OVARY: ICD-10-CM

## 2021-11-23 DIAGNOSIS — R00.2 HEART PALPITATIONS: ICD-10-CM

## 2021-11-23 DIAGNOSIS — I10 ESSENTIAL HYPERTENSION: ICD-10-CM

## 2021-11-23 DIAGNOSIS — I49.3 PVCS (PREMATURE VENTRICULAR CONTRACTIONS): ICD-10-CM

## 2021-11-23 DIAGNOSIS — E03.9 ACQUIRED HYPOTHYROIDISM: ICD-10-CM

## 2021-11-23 DIAGNOSIS — K21.9 GASTROESOPHAGEAL REFLUX DISEASE WITHOUT ESOPHAGITIS: ICD-10-CM

## 2021-11-23 PROCEDURE — G8484 FLU IMMUNIZE NO ADMIN: HCPCS | Performed by: INTERNAL MEDICINE

## 2021-11-23 PROCEDURE — 4040F PNEUMOC VAC/ADMIN/RCVD: CPT | Performed by: INTERNAL MEDICINE

## 2021-11-23 PROCEDURE — 99214 OFFICE O/P EST MOD 30 MIN: CPT | Performed by: INTERNAL MEDICINE

## 2021-11-23 PROCEDURE — 1123F ACP DISCUSS/DSCN MKR DOCD: CPT | Performed by: INTERNAL MEDICINE

## 2021-11-23 PROCEDURE — G8420 CALC BMI NORM PARAMETERS: HCPCS | Performed by: INTERNAL MEDICINE

## 2021-11-23 PROCEDURE — G8427 DOCREV CUR MEDS BY ELIG CLIN: HCPCS | Performed by: INTERNAL MEDICINE

## 2021-11-23 PROCEDURE — 1090F PRES/ABSN URINE INCON ASSESS: CPT | Performed by: INTERNAL MEDICINE

## 2021-11-23 PROCEDURE — 1036F TOBACCO NON-USER: CPT | Performed by: INTERNAL MEDICINE

## 2021-11-23 PROCEDURE — G8399 PT W/DXA RESULTS DOCUMENT: HCPCS | Performed by: INTERNAL MEDICINE

## 2021-11-23 RX ORDER — ATORVASTATIN CALCIUM 10 MG/1
TABLET, FILM COATED ORAL
Qty: 90 TABLET | Refills: 1 | Status: SHIPPED | OUTPATIENT
Start: 2021-11-23 | End: 2022-02-23 | Stop reason: SDUPTHER

## 2021-11-23 RX ORDER — DICYCLOMINE HYDROCHLORIDE 10 MG/1
10 CAPSULE ORAL 4 TIMES DAILY PRN
Qty: 120 CAPSULE | Refills: 1 | Status: SHIPPED | OUTPATIENT
Start: 2021-11-23 | End: 2022-02-23 | Stop reason: SDUPTHER

## 2021-11-23 RX ORDER — LEVOTHYROXINE SODIUM 0.05 MG/1
TABLET ORAL
Qty: 90 TABLET | Refills: 1 | Status: SHIPPED | OUTPATIENT
Start: 2021-11-23 | End: 2022-02-23 | Stop reason: SDUPTHER

## 2021-11-23 ASSESSMENT — PATIENT HEALTH QUESTIONNAIRE - PHQ9
1. LITTLE INTEREST OR PLEASURE IN DOING THINGS: 0
2. FEELING DOWN, DEPRESSED OR HOPELESS: 0
SUM OF ALL RESPONSES TO PHQ QUESTIONS 1-9: 0
SUM OF ALL RESPONSES TO PHQ9 QUESTIONS 1 & 2: 0
SUM OF ALL RESPONSES TO PHQ QUESTIONS 1-9: 0
SUM OF ALL RESPONSES TO PHQ QUESTIONS 1-9: 0

## 2021-11-23 NOTE — PROGRESS NOTES
Savanah Beckwith  Patient's  is 1940  Seen in office on 2021      SUBJECTIVE:  Rafael Khoury keena 78 y. o.year old female presents today   Chief Complaint   Patient presents with    3 Month Follow-Up    Other     saw DR Troncoso, abd pain and white color stools    Other     calling Michelle for Ct and Lab resutls     Medication Refill     Patient is here for follow-up of hypertension, hyperlipidemia, PVCs, hypothyroidism  Patient states she saw Dr. Rojas Griggs for lower abdominal pain and white-colored stools. He ordered the blood test and also CT scan of the abdomen which were done. The results are not available. Patient stated those were done at [facility]  Patient states abdominal pain is better. Patient has hypertension. Taking medications No headaches, no chest pain, no palpitations and no dizziness. Patient has hyperlipidemia. Taking medications. No abdominal pain, no nausea or vomiting. No myalgias. Patient had some PVCs and ventricular tachycardia  but denies any dizziness. Patient has hypothyroidism and is on medications  Taking medications regularly. No side effects noted. Review of Systems  Review of system normal except as in HPI  OBJECTIVE: /72   Pulse 72   Temp 98.3 °F (36.8 °C) (Oral)   Resp 16   Wt 133 lb 3.2 oz (60.4 kg)   SpO2 95%   BMI 23.60 kg/m²     Wt Readings from Last 3 Encounters:   21 133 lb 3.2 oz (60.4 kg)   21 137 lb (62.1 kg)   21 136 lb (61.7 kg)        Patient was seen taking COVID-19 precautions. Face mask, gloves were used. Patient also wore facemask. GENERAL: - Alert, oriented, pleasant, in no apparent distress. HEENT: - Conjunctiva pink, no scleral icterus. ENT clear. NECK: -Supple. No jugular venous distention noted. No masses felt,  CARDIOVASCULAR: - Normal S1 and S2    PULMONARY: - No respiratory distress. No wheezes or rales. ABDOMEN: - Soft and non-tender,no masses  ororganomegaly.   EXTREMITIES: - No cyanosis, clubbing, or significant edema. SKIN: Skin is warm and dry. NEUROLOGICAL: - Cranial nerves II through XII are grossly intact. IMPRESSION:    Encounter Diagnoses   Name Primary?  Essential hypertension     Mixed hyperlipidemia     PVCs (premature ventricular contractions)     H/O mitral valve prolapse     Acquired hypothyroidism     Colitis     Heart palpitations     Cyst of right ovary     Ventricular tachycardia (HCC)     Gastroesophageal reflux disease without esophagitis        ASSESSMENT/PLAN:    Essential hypertension    Patient has HTN for > 10 years  ON benazepril to 10 mg daily     Mixed hyperlipidemia    Patient on lipitor 10 mg daily  for hyperlipidemia  Lipid profile is good on 5/25/21     PVCs (premature ventricular contractions)    Cardiac arrhythmias stable. No palpitations. H/O mitral valve prolapse. Stable       Acquired hypothyroidism    Pt is taking synthroid 50 mcg daily    Colitis    Dr. Usha Lee saw patient . Had colonosocpy 11/13 and SBFT   Pt takes bentyl prn. Has not used it for some time. Pt is having off and on abdominal pain. Cramps. Dr Bassam Ramirez ordered CT abdomen and was reported to be normal  She is not taking bentyl. Advised patient to take Bentyl and see if it helps    Heart palpitations    Pt has PAC and PVC in the past.   30 event monitor : nothing significant. Has cardiology appt. Sees Dr Xander Linda and Dr Selma Lopez   Going to see Dr Selma Lopez again. Cyst of right ovary    Seen Dr Isak Gamble and had several US per pt. Last CT 4/2021 : showed slight increase in ovarian cyst. Referred to Dr Kalyani Almanza and he is going to see her and get US done . Not seen him yet . Has appointment     Ventricular tachycardia ([address]    Exercise induced 8/1/17Cath normal coronories  Referred to Dr Selma Lopez. Recommended EP study  He is planning to start her on some medication in hospital.   She will decide     Gastroesophageal reflux disease without esophagitis.   Follow antireflux measures    Orders Placed This Encounter   Medications    atorvastatin (LIPITOR) 10 MG tablet     Sig: TAKE 1 TABLET EVERY DAY     Dispense:  90 tablet     Refill:  1    levothyroxine (SYNTHROID) 50 MCG tablet     Sig: TAKE 1 TABLET BY MOUTH DAILY     Dispense:  90 tablet     Refill:  1    dicyclomine (BENTYL) 10 MG capsule     Sig: Take 1 capsule by mouth 4 times daily as needed (abdominal cramps)     Dispense:  120 capsule     Refill:  1     Return to office in 3 months. Mediations reviewed with the patient. Continue current medications. Appropriate prescriptions are addressed. After visit summeryprovided. Follow up as directed sooner if needed. Questions answered and patient verbalizes understanding. Call for any problems, questions, or concerns. Allergies   Allergen Reactions    Amiodarone      Severe hair loss    Amoxicillin Other (See Comments)     Pt states she got C-diff. , so she doesn't like atb     Current Outpatient Medications   Medication Sig Dispense Refill    benazepril (LOTENSIN) 10 MG tablet TAKE 1 TABLET BY MOUTH EVERY DAY 30 tablet 2    levothyroxine (SYNTHROID) 50 MCG tablet TAKE 1 TABLET BY MOUTH DAILY 90 tablet 1    atorvastatin (LIPITOR) 10 MG tablet TAKE 1 TABLET EVERY DAY 90 tablet 1    aspirin 81 MG tablet Take 81 mg by mouth daily Take 2 tabs by mouth in am and one tab at hs      Magnesium 125 MG CAPS Take by mouth daily      lactobacillus (CULTURELLE) capsule Take 1 capsule by mouth 2 times daily (with meals) 30 capsule 0    Coenzyme Q10 (CO Q 10) 100 MG CAPS Take by mouth daily       Cholecalciferol (VITAMIN D3) 2000 UNITS CAPS Take 1 capsule by mouth daily       Multiple Vitamins-Minerals (MULTIVITAMIN & MINERAL PO) Take 1 tablet by mouth daily      calcium carbonate (OSCAL) 500 MG TABS tablet Take 500 mg by mouth daily       No current facility-administered medications for this visit.      Past Medical History:   Diagnosis Date    Acute deep vein thrombosis (DVT) of distal vein of left lower extremity ([address] 10/23/2016    DVT involving left peroneal, posterior and anterior tibial veins 10/16 Treated for at least 3 months and repeat venous doppler was negative and was discontinued     Axillary adenopathy 8/28/2014    US of axilla 6/14 showed benign lymph nodes    C. difficile diarrhea 11/18/2016    Treated few times and now is normal    CAD (coronary artery disease)     Chest discomfort 8/1/2017    Colitis 12/13    EGD,COLONOSCOPY, SBFT normal. Dr. Marisel Cazares    Cyst of right ovary 9/6/2016    Seen Dr Park Rodriguez and had several US per pt.  Dizzy spells 6/5/2017    H/O 24 hour EKG monitoring 7/27/09    NSR, few PVCs and occasional couplet noted (total 1007 PVCs), no pauses noted.  H/O colonoscopy 10/09, 12/13    f/u in 5 years    H/O echocardiogram 9/17/12    Normal LV size and function, mild mitral and tricuspid regurg., EF 60%.  H/O mitral valve prolapse 2003    MR mild    H/O myocardial perfusion scan 12/16/14    Stress Cardiolite. Normal perfusion in the distribution of all coronaries, normal LV size and function, EF 70%.  H/O screening mammography 8/11    Dr Johny Nolen History of cardiac monitoring 06/05/2017    Abnormal-SR with complex ventricular arrhythmias and frequent PVCs. No symptoms during marked bradycardia or during the fastest rate of 143 with nonsustained three-beat runs of ventricular tachycardia.  History of cardiac monitoring 08/23/2017    Abnormal event monitor findings suggestive of predominantly sinus bradycardia with isolated and frequent low-grade ventricular ectopy with symptoms reported.      Hx of Doppler ultrasound 10/17/2017    duplex venous doppler-minimal reflux noted in the left CFV    Hyperlipidemia     Hypertension     Hypothyroidism     Liver dysfunction     liver bx 10/09,mild steaotosis    Osteoarthritis of cervical spine 6/7/2016    Ovarian cyst     Papanicolaou smear 09/14/2016    Dr. Johny Nolen PVCs (premature ventricular contractions)     symptomatic frequent PVCs, sees Dr. Tanner Pole Ventricular tachycardia ([address] 8/1/2017    Exercise induced 8/1/17     Past Surgical History:   Procedure Laterality Date    APPENDECTOMY      BREAST BIOPSY      CATARACT REMOVAL Bilateral     COLONOSCOPY  12/3/13,2-28-17 2.28.17 no need for follow up due to pt's advanced age per Dr. Marta Cedillo.  DIAGNOSTIC CARDIAC CATH LAB PROCEDURE  08/03/2017    Normal coronaries, EF 70%.  HYSTERECTOMY  1961    LIVER BIOPSY  10/09    steasosis    UPPER GASTROINTESTINAL ENDOSCOPY  11/02/2017    Dr. Joselyn Domínguez History     Tobacco Use    Smoking status: Never Smoker    Smokeless tobacco: Never Used   Substance Use Topics    Alcohol use:  Yes     Alcohol/week: 0.0 standard drinks     Comment:  1 beers per day or 2 glasses of \"bubbly\"/day       LAB REVIEW:  CBC:   Lab Results   Component Value Date    WBC 7.1 05/19/2020    HGB 13.3 05/19/2020    HCT 39.2 05/19/2020     05/19/2020     Lipids:   Lab Results   Component Value Date    HDL 65 (H) 05/25/2021    LDLCALC 54 05/25/2021    LDLDIRECT 64 12/04/2020    TRIGLYCFAST 61 05/25/2021    CHOLFAST 131 05/25/2021     Renal:   Lab Results   Component Value Date    BUN 23 05/25/2021    CREATININE 0.9 05/25/2021     05/25/2021    K 4.8 05/25/2021    ALT 20 05/25/2021    AST 28 05/25/2021    GLUCOSE 94 05/25/2021    GLUF 95 12/04/2020     PT/INR:   Lab Results   Component Value Date    INR 0.91 10/17/2017     A1C:   Lab Results   Component Value Date    LABA1C 5.5 11/13/2019           Víctor Curran MD, 11/23/2021 , 10:22 AM

## 2021-11-23 NOTE — ASSESSMENT & PLAN NOTE
Exercise induced 8/1/17[facility] normal coronories  Referred to Dr Eric Davis.  Recommended EP study  He is planning to start her on some medication in hospital.   She will decide

## 2021-11-23 NOTE — ASSESSMENT & PLAN NOTE
Pt has PAC and PVC in the past.   30 event monitor : nothing significant. Has cardiology appt. Sees Dr Vargas Rhodes and Dr Kevin Wing   Going to see Dr Kevin Wing again.

## 2021-12-07 ENCOUNTER — OFFICE VISIT (OUTPATIENT)
Dept: CARDIOLOGY CLINIC | Age: 81
End: 2021-12-07
Payer: MEDICARE

## 2021-12-07 VITALS
SYSTOLIC BLOOD PRESSURE: 124 MMHG | HEART RATE: 73 BPM | HEIGHT: 63 IN | DIASTOLIC BLOOD PRESSURE: 84 MMHG | WEIGHT: 136.2 LBS | BODY MASS INDEX: 24.13 KG/M2

## 2021-12-07 DIAGNOSIS — R00.2 PALPITATIONS: Primary | ICD-10-CM

## 2021-12-07 PROCEDURE — 1036F TOBACCO NON-USER: CPT | Performed by: INTERNAL MEDICINE

## 2021-12-07 PROCEDURE — G8484 FLU IMMUNIZE NO ADMIN: HCPCS | Performed by: INTERNAL MEDICINE

## 2021-12-07 PROCEDURE — G8420 CALC BMI NORM PARAMETERS: HCPCS | Performed by: INTERNAL MEDICINE

## 2021-12-07 PROCEDURE — 99213 OFFICE O/P EST LOW 20 MIN: CPT | Performed by: INTERNAL MEDICINE

## 2021-12-07 PROCEDURE — G8399 PT W/DXA RESULTS DOCUMENT: HCPCS | Performed by: INTERNAL MEDICINE

## 2021-12-07 PROCEDURE — 93000 ELECTROCARDIOGRAM COMPLETE: CPT | Performed by: INTERNAL MEDICINE

## 2021-12-07 PROCEDURE — 1090F PRES/ABSN URINE INCON ASSESS: CPT | Performed by: INTERNAL MEDICINE

## 2021-12-07 PROCEDURE — G8427 DOCREV CUR MEDS BY ELIG CLIN: HCPCS | Performed by: INTERNAL MEDICINE

## 2021-12-07 PROCEDURE — 1123F ACP DISCUSS/DSCN MKR DOCD: CPT | Performed by: INTERNAL MEDICINE

## 2021-12-07 PROCEDURE — 4040F PNEUMOC VAC/ADMIN/RCVD: CPT | Performed by: INTERNAL MEDICINE

## 2021-12-07 NOTE — PATIENT INSTRUCTIONS
**It is YOUR responsibilty to bring medication bottles and/or updated medication list to [address] [place] [address]. This will allow us to better serve you and all your healthcare needs**  Please be informed that if you contact our office outside of normal business hours the physician on call cannot help with any scheduling or rescheduling issues, procedure instruction questions or any type of medication issue. We advise you for any urgent/emergency that you go to the nearest emergency room!     PLEASE CALL OUR OFFICE DURING NORMAL BUSINESS HOURS    Monday - Friday   8 am to 5 pm    [place]: [phone]    [place]:  [phone]

## 2021-12-07 NOTE — PROGRESS NOTES
CARDIOLOGY NOTE      12/7/2021    RE: Radha Landaverde  (1940)                               TO:  Dr. Neha Salcido MD            Maddie Carlson is a 78 y.o. female who was seen today for management of  pvcs                                    HPI:                   Pt has h/o htn, hyperlipidimea, PVCs, seen today for  fu.  Pt has  No cardiac complains    Radha Landaverde has the following history recorded in care path:  Patient Active Problem List    Diagnosis Date Noted    Hyponatremia 05/25/2021    Gastroesophageal reflux disease without esophagitis 11/28/2017    Ventricular tachycardia ([address] 08/01/2017    Cyst of right ovary 09/06/2016    Heart palpitations 11/20/2014    Colitis 11/14/2013    Essential hypertension     Mixed hyperlipidemia     PVCs (premature ventricular contractions)     H/O mitral valve prolapse     Liver dysfunction     Acquired hypothyroidism      Current Outpatient Medications   Medication Sig Dispense Refill    atorvastatin (LIPITOR) 10 MG tablet TAKE 1 TABLET EVERY DAY 90 tablet 1    levothyroxine (SYNTHROID) 50 MCG tablet TAKE 1 TABLET BY MOUTH DAILY 90 tablet 1    dicyclomine (BENTYL) 10 MG capsule Take 1 capsule by mouth 4 times daily as needed (abdominal cramps) 120 capsule 1    benazepril (LOTENSIN) 10 MG tablet TAKE 1 TABLET BY MOUTH EVERY DAY 30 tablet 2    aspirin 81 MG tablet Take 81 mg by mouth daily Take 2 tabs by mouth in am and one tab at hs      Magnesium 125 MG CAPS Take by mouth daily      lactobacillus (CULTURELLE) capsule Take 1 capsule by mouth 2 times daily (with meals) 30 capsule 0    Coenzyme Q10 (CO Q 10) 100 MG CAPS Take by mouth daily       Cholecalciferol (VITAMIN D3) 2000 UNITS CAPS Take 1 capsule by mouth daily       Multiple Vitamins-Minerals (MULTIVITAMIN & MINERAL PO) Take 1 tablet by mouth daily      calcium carbonate (OSCAL) 500 MG TABS tablet Take 500 mg by mouth daily       No current facility-administered medications for this visit. Allergies: Amiodarone and Amoxicillin  Past Medical History:   Diagnosis Date    Acute deep vein thrombosis (DVT) of distal vein of left lower extremity ([address] 10/23/2016    DVT involving left peroneal, posterior and anterior tibial veins 10/16 Treated for at least 3 months and repeat venous doppler was negative and was discontinued     Axillary adenopathy 8/28/2014    US of axilla 6/14 showed benign lymph nodes    C. difficile diarrhea 11/18/2016    Treated few times and now is normal    CAD (coronary artery disease)     Chest discomfort 8/1/2017    Colitis 12/13    EGD,COLONOSCOPY, SBFT normal. Dr. Natasha Crowley    Cyst of right ovary 9/6/2016    Seen Dr Ritika Carmona and had several US per pt.  Dizzy spells 6/5/2017    H/O 24 hour EKG monitoring 7/27/09    NSR, few PVCs and occasional couplet noted (total 1007 PVCs), no pauses noted.  H/O colonoscopy 10/09, 12/13    f/u in 5 years    H/O echocardiogram 9/17/12    Normal LV size and function, mild mitral and tricuspid regurg., EF 60%.  H/O mitral valve prolapse 2003    MR mild    H/O myocardial perfusion scan 12/16/14    Stress Cardiolite. Normal perfusion in the distribution of all coronaries, normal LV size and function, EF 70%.  H/O screening mammography 8/11    Dr Corrales Major History of cardiac monitoring 06/05/2017    Abnormal-SR with complex ventricular arrhythmias and frequent PVCs. No symptoms during marked bradycardia or during the fastest rate of 143 with nonsustained three-beat runs of ventricular tachycardia.  History of cardiac monitoring 08/23/2017    Abnormal event monitor findings suggestive of predominantly sinus bradycardia with isolated and frequent low-grade ventricular ectopy with symptoms reported.      Hx of Doppler ultrasound 10/17/2017    duplex venous doppler-minimal reflux noted in the left CFV    Hyperlipidemia     Hypertension     Hypothyroidism     Liver dysfunction     liver bx 10/09,mild steaotosis  Osteoarthritis of cervical spine 6/7/2016    Ovarian cyst     Papanicolaou smear 09/14/2016    Dr. Odalis Cruz PVCs (premature ventricular contractions)     symptomatic frequent PVCs, sees Dr. Perlita Prince Ventricular tachycardia ([address] 8/1/2017    Exercise induced 8/1/17     Past Surgical History:   Procedure Laterality Date    APPENDECTOMY      BREAST BIOPSY      CATARACT REMOVAL Bilateral     COLONOSCOPY  12/3/13,2-28-17    2.28.17 no need for follow up due to pt's advanced age per Dr. Lee Chavira.  DIAGNOSTIC CARDIAC CATH LAB PROCEDURE  08/03/2017    Normal coronaries, EF 70%.  HYSTERECTOMY  1961    LIVER BIOPSY  10/09    steasosis    UPPER GASTROINTESTINAL ENDOSCOPY  11/02/2017    Dr. Lee Chavira      As reviewed   Family History   Problem Relation Age of Onset    Heart Surgery Mother         CABG    Rheum Arthritis Mother     Osteoarthritis Mother     Hypertension Mother     High Cholesterol Mother     Migraines Mother     Stroke Mother     Heart Disease Mother     Heart Disease Brother     Heart Attack Father     Heart Disease Father     Heart Failure Maternal Grandmother     Heart Disease Brother      Social History     Tobacco Use    Smoking status: Never Smoker    Smokeless tobacco: Never Used   Substance Use Topics    Alcohol use: Yes     Alcohol/week: 0.0 standard drinks     Comment:  1 beers per day or 2 glasses of \"bubbly\"/day      Review of Systems:    Constitutional: Negative for diaphoresis and fatigue  Psychological:Negative for anxiety or depression  HENT: Negative for headaches, nasal congestion, sinus pain or vertigo  Eyes: Negative for visual disturbance.    Endocrine: Negative for polydipsia/polyuria  Respiratory: Negative for shortness of breath  Cardiovascular: Negative for chest pain, dyspnea on exertion, claudication, edema, irregular heartbeat, murmur, palpitations or shortness of breath  Gastrointestinal: Negative for abdominal pain or heartburn  Genito-Urinary: Negative for urinary frequency/urgency  Musculoskeletal: Negative for muscle pain, muscular weakness, negative for pain in arm and leg or swelling in foot and leg  Neurological: Negative for dizziness, headaches, memory loss, numbness/tingling, visual changes, syncope  Dermatological: Negative for rash    Objective:    Vitals:    12/07/21 0921   BP: 124/84   Pulse: 73   Weight: 136 lb 3.2 oz (61.8 kg)   Height: 5' 3\" (1.6 m)     /84   Pulse 73   Ht 5' 3\" (1.6 m)   Wt 136 lb 3.2 oz (61.8 kg)   BMI 24.13 kg/m²     No flowsheet data found. Wt Readings from Last 3 Encounters:   12/07/21 136 lb 3.2 oz (61.8 kg)   11/23/21 133 lb 3.2 oz (60.4 kg)   09/24/21 137 lb (62.1 kg)     Body mass index is 24.13 kg/m². GENERAL - Alert, oriented, pleasant, in no apparent distress. EYES: No jaundice, no conjunctival pallor. SKIN: It is warm & dry. No rashes. No Echhymosis    HEENT - No clinically significant abnormalities seen. Neck - Supple. No jugular venous distention noted. No carotid bruits. Cardiovascular - Normal S1 and S2 without obvious murmur or gallop. Extremities - No cyanosis, clubbing, or significant edema. Pulmonary - No respiratory distress. No wheezes or rales. Abdomen - No masses, tenderness, or organomegaly. Musculoskeletal - No significant edema. No joint deformities. No muscle wasting. Neurologic - Cranial nerves II through XII are grossly intact. There were no gross focal neurologic abnormalities.     Lab Review   Lab Results   Component Value Date    CKTOTAL 152 09/14/2012    CKMB 2.4 09/14/2012    TROPONINT <0.010 01/09/2020     BNP:  No results found for: BNP  PT/INR:    Lab Results   Component Value Date    INR 0.91 10/17/2017     Lab Results   Component Value Date    LABA1C 5.5 11/13/2019    LABA1C 5.3 11/11/2013     Lab Results   Component Value Date    WBC 7.1 05/19/2020    HCT 39.2 05/19/2020    MCV 91.5 05/19/2020     05/19/2020     Lab Results   Component Value Date    CHOL

## 2022-02-03 ENCOUNTER — TELEPHONE (OUTPATIENT)
Dept: CARDIOLOGY CLINIC | Age: 82
End: 2022-02-03

## 2022-02-16 ENCOUNTER — TELEPHONE (OUTPATIENT)
Dept: INTERNAL MEDICINE CLINIC | Age: 82
End: 2022-02-16

## 2022-02-23 ENCOUNTER — OFFICE VISIT (OUTPATIENT)
Dept: INTERNAL MEDICINE CLINIC | Age: 82
End: 2022-02-23
Payer: MEDICARE

## 2022-02-23 VITALS
RESPIRATION RATE: 12 BRPM | BODY MASS INDEX: 23.95 KG/M2 | DIASTOLIC BLOOD PRESSURE: 66 MMHG | HEART RATE: 58 BPM | WEIGHT: 135.2 LBS | TEMPERATURE: 98.1 F | SYSTOLIC BLOOD PRESSURE: 126 MMHG

## 2022-02-23 DIAGNOSIS — K21.9 GASTROESOPHAGEAL REFLUX DISEASE WITHOUT ESOPHAGITIS: ICD-10-CM

## 2022-02-23 DIAGNOSIS — I49.3 PVCS (PREMATURE VENTRICULAR CONTRACTIONS): ICD-10-CM

## 2022-02-23 DIAGNOSIS — K76.89 LIVER DYSFUNCTION: ICD-10-CM

## 2022-02-23 DIAGNOSIS — E87.1 HYPONATREMIA: ICD-10-CM

## 2022-02-23 DIAGNOSIS — R00.2 HEART PALPITATIONS: ICD-10-CM

## 2022-02-23 DIAGNOSIS — Z86.79 H/O MITRAL VALVE PROLAPSE: ICD-10-CM

## 2022-02-23 DIAGNOSIS — K52.9 COLITIS: ICD-10-CM

## 2022-02-23 DIAGNOSIS — E78.2 MIXED HYPERLIPIDEMIA: ICD-10-CM

## 2022-02-23 DIAGNOSIS — I10 ESSENTIAL HYPERTENSION: ICD-10-CM

## 2022-02-23 DIAGNOSIS — E03.9 ACQUIRED HYPOTHYROIDISM: ICD-10-CM

## 2022-02-23 DIAGNOSIS — I47.20 VENTRICULAR TACHYCARDIA: ICD-10-CM

## 2022-02-23 PROCEDURE — 1090F PRES/ABSN URINE INCON ASSESS: CPT | Performed by: INTERNAL MEDICINE

## 2022-02-23 PROCEDURE — G8399 PT W/DXA RESULTS DOCUMENT: HCPCS | Performed by: INTERNAL MEDICINE

## 2022-02-23 PROCEDURE — G8427 DOCREV CUR MEDS BY ELIG CLIN: HCPCS | Performed by: INTERNAL MEDICINE

## 2022-02-23 PROCEDURE — 99214 OFFICE O/P EST MOD 30 MIN: CPT | Performed by: INTERNAL MEDICINE

## 2022-02-23 PROCEDURE — 1123F ACP DISCUSS/DSCN MKR DOCD: CPT | Performed by: INTERNAL MEDICINE

## 2022-02-23 PROCEDURE — G8484 FLU IMMUNIZE NO ADMIN: HCPCS | Performed by: INTERNAL MEDICINE

## 2022-02-23 PROCEDURE — 1036F TOBACCO NON-USER: CPT | Performed by: INTERNAL MEDICINE

## 2022-02-23 PROCEDURE — 4040F PNEUMOC VAC/ADMIN/RCVD: CPT | Performed by: INTERNAL MEDICINE

## 2022-02-23 PROCEDURE — G8420 CALC BMI NORM PARAMETERS: HCPCS | Performed by: INTERNAL MEDICINE

## 2022-02-23 RX ORDER — DICYCLOMINE HYDROCHLORIDE 10 MG/1
10 CAPSULE ORAL 4 TIMES DAILY PRN
Qty: 120 CAPSULE | Refills: 1 | Status: SHIPPED | OUTPATIENT
Start: 2022-02-23

## 2022-02-23 RX ORDER — LEVOTHYROXINE SODIUM 0.05 MG/1
TABLET ORAL
Qty: 90 TABLET | Refills: 1 | Status: SHIPPED | OUTPATIENT
Start: 2022-02-23 | End: 2022-08-31 | Stop reason: SDUPTHER

## 2022-02-23 RX ORDER — ATORVASTATIN CALCIUM 10 MG/1
TABLET, FILM COATED ORAL
Qty: 90 TABLET | Refills: 1 | Status: SHIPPED | OUTPATIENT
Start: 2022-02-23 | End: 2022-08-31 | Stop reason: SDUPTHER

## 2022-02-23 RX ORDER — BENAZEPRIL HYDROCHLORIDE 10 MG/1
10 TABLET ORAL DAILY
Qty: 30 TABLET | Refills: 5 | Status: SHIPPED | OUTPATIENT
Start: 2022-02-23 | End: 2022-08-22

## 2022-02-23 RX ORDER — DOCUSATE SODIUM 100 MG/1
100 CAPSULE, LIQUID FILLED ORAL DAILY PRN
Qty: 30 CAPSULE | Refills: 2 | Status: SHIPPED | OUTPATIENT
Start: 2022-02-23

## 2022-02-23 ASSESSMENT — PATIENT HEALTH QUESTIONNAIRE - PHQ9
SUM OF ALL RESPONSES TO PHQ QUESTIONS 1-9: 0
SUM OF ALL RESPONSES TO PHQ QUESTIONS 1-9: 0
2. FEELING DOWN, DEPRESSED OR HOPELESS: 0
SUM OF ALL RESPONSES TO PHQ9 QUESTIONS 1 & 2: 0
SUM OF ALL RESPONSES TO PHQ QUESTIONS 1-9: 0
SUM OF ALL RESPONSES TO PHQ QUESTIONS 1-9: 0
1. LITTLE INTEREST OR PLEASURE IN DOING THINGS: 0

## 2022-02-23 NOTE — PROGRESS NOTES
Ludwig Sorto  Patient's  is 1940  Seen in office on 2022      SUBJECTIVE:  Esme Owens keena 78 y. o.year old female presents today   Chief Complaint   Patient presents with    Follow-up    Medication Refill     Patient is here for follow-up of hypertension, hyperlipidemia, PVCs, hypothyroidism  Feels well. No complaints  Patient has hypertension. Taking medications No headaches, no chest pain, no palpitations and no dizziness. Patient has hyperlipidemia. Taking medications. No abdominal pain, no nausea or vomiting. No myalgias. Patient has history of GERD but is not taking any medication and is feeling well. She has a history of cardiac arrhythmias and ventricular tachycardia that has seen cardiologist and EP. Patient is feeling well no syncope or near syncope. Taking medications regularly. No side effects noted. Review of Systems  Review of system normal except as in HPI  OBJECTIVE: /66   Pulse 58   Temp 98.1 °F (36.7 °C) (Temporal)   Resp 12   Wt 135 lb 3.2 oz (61.3 kg)   BMI 23.95 kg/m²     Wt Readings from Last 3 Encounters:   22 135 lb 3.2 oz (61.3 kg)   21 136 lb 3.2 oz (61.8 kg)   21 133 lb 3.2 oz (60.4 kg)        Patient was seen taking COVID-19 precautions. Face mask, gloves were used. Patient also wore facemask. GENERAL: - Alert, oriented, pleasant, in no apparent distress. HEENT: - Conjunctiva pink, no scleral icterus. ENT clear. NECK: -Supple. No jugular venous distention noted. No masses felt,  CARDIOVASCULAR: - Normal S1 and S2    PULMONARY: - No respiratory distress. No wheezes or rales. ABDOMEN: - Soft and non-tender,no masses  ororganomegaly. EXTREMITIES: - No cyanosis, clubbing, or significant edema. SKIN: Skin is warm and dry. NEUROLOGICAL: - Cranial nerves II through XII are grossly intact. IMPRESSION:    Encounter Diagnoses   Name Primary?     Essential hypertension     Ventricular tachycardia (HCC)     Mixed hyperlipidemia answered and patient verbalizes understanding. Call for any problems, questions, or concerns. Allergies   Allergen Reactions    Amiodarone      Severe hair loss    Amoxicillin Other (See Comments)     Pt states she got C-diff. , so she doesn't like atb     Current Outpatient Medications   Medication Sig Dispense Refill    atorvastatin (LIPITOR) 10 MG tablet TAKE 1 TABLET EVERY DAY 90 tablet 1    levothyroxine (SYNTHROID) 50 MCG tablet TAKE 1 TABLET BY MOUTH DAILY 90 tablet 1    dicyclomine (BENTYL) 10 MG capsule Take 1 capsule by mouth 4 times daily as needed (abdominal cramps) 120 capsule 1    benazepril (LOTENSIN) 10 MG tablet TAKE 1 TABLET BY MOUTH EVERY DAY 30 tablet 2    aspirin 81 MG tablet Take 81 mg by mouth daily Take 2 tabs by mouth in am and one tab at hs      Magnesium 125 MG CAPS Take by mouth daily      lactobacillus (CULTURELLE) capsule Take 1 capsule by mouth 2 times daily (with meals) 30 capsule 0    Coenzyme Q10 (CO Q 10) 100 MG CAPS Take by mouth daily       Cholecalciferol (VITAMIN D3) 2000 UNITS CAPS Take 1 capsule by mouth daily       Multiple Vitamins-Minerals (MULTIVITAMIN & MINERAL PO) Take 1 tablet by mouth daily      calcium carbonate (OSCAL) 500 MG TABS tablet Take 500 mg by mouth daily       No current facility-administered medications for this visit.      Past Medical History:   Diagnosis Date    Acute deep vein thrombosis (DVT) of distal vein of left lower extremity ([address] 10/23/2016    DVT involving left peroneal, posterior and anterior tibial veins 10/16 Treated for at least 3 months and repeat venous doppler was negative and was discontinued     Axillary adenopathy 8/28/2014    US of axilla 6/14 showed benign lymph nodes    C. difficile diarrhea 11/18/2016    Treated few times and now is normal    CAD (coronary artery disease)     Chest discomfort 8/1/2017    Colitis 12/13    EGD,COLONOSCOPY, SBFT normal. Dr. Musa Araiza    Cyst of right ovary 9/6/2016    Seen  [facility] and had several US per pt.  Dizzy spells 6/5/2017    H/O 24 hour EKG monitoring 7/27/09    NSR, few PVCs and occasional couplet noted (total 1007 PVCs), no pauses noted.  H/O colonoscopy 10/09, 12/13    f/u in 5 years    H/O echocardiogram 9/17/12    Normal LV size and function, mild mitral and tricuspid regurg., EF 60%.  H/O mitral valve prolapse 2003    MR mild    H/O myocardial perfusion scan 12/16/14    Stress Cardiolite. Normal perfusion in the distribution of all coronaries, normal LV size and function, EF 70%.  H/O screening mammography 8/11    Dr Jackie Hopkins History of cardiac monitoring 06/05/2017    Abnormal-SR with complex ventricular arrhythmias and frequent PVCs. No symptoms during marked bradycardia or during the fastest rate of 143 with nonsustained three-beat runs of ventricular tachycardia.  History of cardiac monitoring 08/23/2017    Abnormal event monitor findings suggestive of predominantly sinus bradycardia with isolated and frequent low-grade ventricular ectopy with symptoms reported.  Hx of Doppler ultrasound 10/17/2017    duplex venous doppler-minimal reflux noted in the left CFV    Hyperlipidemia     Hypertension     Hypothyroidism     Liver dysfunction     liver bx 10/09,mild steaotosis    Osteoarthritis of cervical spine 6/7/2016    Ovarian cyst     Papanicolaou smear 09/14/2016    Dr. Jackie Hopkins PVCs (premature ventricular contractions)     symptomatic frequent PVCs, sees Dr. Yulissa Wang Ventricular tachycardia ([facility] [address] 8/1/2017    Exercise induced 8/1/17     Past Surgical History:   Procedure Laterality Date    APPENDECTOMY      BREAST BIOPSY      CATARACT REMOVAL Bilateral     COLONOSCOPY  12/3/13,2-28-17 2.28.17 no need for follow up due to pt's advanced age per Dr. Smiley Beaulieu.  DIAGNOSTIC CARDIAC CATH LAB PROCEDURE  08/03/2017    Normal coronaries, EF 70%.    [address]    LIVER BIOPSY  10/09    steasosis    UPPER GASTROINTESTINAL ENDOSCOPY 11/02/2017    Dr. Suzanne Villegas     Social History     Tobacco Use    Smoking status: Never Smoker    Smokeless tobacco: Never Used   Substance Use Topics    Alcohol use:  Yes     Alcohol/week: 0.0 standard drinks     Comment:  1 beers per day or 2 glasses of \"bubbly\"/day       LAB REVIEW:  CBC:   Lab Results   Component Value Date    WBC 7.1 05/19/2020    HGB 13.3 05/19/2020    HCT 39.2 05/19/2020     05/19/2020     Lipids:   Lab Results   Component Value Date    HDL 65 (H) 05/25/2021    LDLCALC 54 05/25/2021    LDLDIRECT 64 12/04/2020    TRIGLYCFAST 61 05/25/2021    CHOLFAST 131 05/25/2021     Renal:   Lab Results   Component Value Date    BUN 23 05/25/2021    CREATININE 0.9 05/25/2021     05/25/2021    K 4.8 05/25/2021    ALT 20 05/25/2021    AST 28 05/25/2021    GLUCOSE 94 05/25/2021    GLUF 95 12/04/2020     PT/INR:   Lab Results   Component Value Date    INR 0.91 10/17/2017     A1C:   Lab Results   Component Value Date    LABA1C 5.5 11/13/2019           Farshad Edmond MD, 2/23/2022 , 10:33 AM

## 2022-02-23 NOTE — ASSESSMENT & PLAN NOTE
asymptomatic frequent PVCs as per 1/2016 event monitor  Pt was on amiodarone therapy but she stopped it in 5/15. Dr. Gladis Jean Baptiste knows that. VT and PVCs on stress test:   Exercise induced 8/1/17  Cath normal coronories  Referred to Dr Mono Lima. Recommended EP study. Pt declined.   Pt has seen [facility] for 2nd opinion and Dr Josiah Lo did not recommend EP study  Has appointment with Dr Mono Lima in 9/2021   Has appointment coming

## 2022-02-23 NOTE — ASSESSMENT & PLAN NOTE
Patient has HTN for > 10 years  Patient is on benazepril 20 mg daily  BP low at home  Today it is 102/60  Decrease benazepril to 10 mg daily  Now BP is normal

## 2022-02-23 NOTE — ASSESSMENT & PLAN NOTE
Dr. Oscar Conti saw patient . Had colonosocpy 11/13 and SBFT   Pt takes bentyl prn. Has not used it for some time.

## 2022-02-23 NOTE — ASSESSMENT & PLAN NOTE
Exercise induced 8/1/17Cat normal coronories  Referred to Dr Eagle Murguia.  Recommended EP study  He is planning to start her on some medication in hospital.   She will decide

## 2022-02-24 ENCOUNTER — TELEPHONE (OUTPATIENT)
Dept: CARDIOLOGY CLINIC | Age: 82
End: 2022-02-24

## 2022-03-16 ENCOUNTER — HOSPITAL ENCOUNTER (OUTPATIENT)
Age: 82
Discharge: HOME | End: 2022-03-16
Payer: MEDICARE

## 2022-03-16 LAB
ALBUMIN SERPL-MCNC: 4.3 GM/DL (ref 3.4–5)
ALP BLD-CCNC: 77 IU/L (ref 40–129)
ALT SERPL-CCNC: 22 U/L (ref 10–40)
ANION GAP SERPL CALCULATED.3IONS-SCNC: 11 MMOL/L (ref 4–16)
AST SERPL-CCNC: 17 IU/L (ref 15–37)
BASOPHILS ABSOLUTE: 0.1 K/CU MM
BASOPHILS RELATIVE PERCENT: 0.7 % (ref 0–1)
BILIRUB SERPL-MCNC: 0.4 MG/DL (ref 0–1)
BUN BLDV-MCNC: 22 MG/DL (ref 6–23)
CALCIUM SERPL-MCNC: 9.6 MG/DL (ref 8.3–10.6)
CHLORIDE BLD-SCNC: 101 MMOL/L (ref 99–110)
CHOLESTEROL, FASTING: 143 MG/DL
CO2: 23 MMOL/L (ref 21–32)
CREAT SERPL-MCNC: 1 MG/DL (ref 0.6–1.1)
DIFFERENTIAL TYPE: ABNORMAL
EOSINOPHILS ABSOLUTE: 0.3 K/CU MM
EOSINOPHILS RELATIVE PERCENT: 3.3 % (ref 0–3)
GFR AFRICAN AMERICAN: >60 ML/MIN/1.73M2
GFR NON-AFRICAN AMERICAN: 53 ML/MIN/1.73M2
GLUCOSE FASTING: 105 MG/DL (ref 70–99)
HCT VFR BLD CALC: 41.7 % (ref 37–47)
HDLC SERPL-MCNC: 66 MG/DL
HEMOGLOBIN: 13.7 GM/DL (ref 12.5–16)
IMMATURE NEUTROPHIL %: 0.4 % (ref 0–0.43)
LDL CHOLESTEROL CALCULATED: 60 MG/DL
LYMPHOCYTES ABSOLUTE: 2.3 K/CU MM
LYMPHOCYTES RELATIVE PERCENT: 27.2 % (ref 24–44)
MCH RBC QN AUTO: 29.9 PG (ref 27–31)
MCHC RBC AUTO-ENTMCNC: 32.9 % (ref 32–36)
MCV RBC AUTO: 91 FL (ref 78–100)
MONOCYTES ABSOLUTE: 0.6 K/CU MM
MONOCYTES RELATIVE PERCENT: 7.5 % (ref 0–4)
PDW BLD-RTO: 13.7 % (ref 11.7–14.9)
PLATELET # BLD: 247 K/CU MM (ref 140–440)
PMV BLD AUTO: 10.2 FL (ref 7.5–11.1)
POTASSIUM SERPL-SCNC: 4.8 MMOL/L (ref 3.5–5.1)
RBC # BLD: 4.58 M/CU MM (ref 4.2–5.4)
SEGMENTED NEUTROPHILS ABSOLUTE COUNT: 5.2 K/CU MM
SEGMENTED NEUTROPHILS RELATIVE PERCENT: 60.9 % (ref 36–66)
SODIUM BLD-SCNC: 135 MMOL/L (ref 135–145)
TOTAL IMMATURE NEUTOROPHIL: 0.03 K/CU MM
TOTAL PROTEIN: 7 GM/DL (ref 6.4–8.2)
TRIGLYCERIDE, FASTING: 87 MG/DL
TSH HIGH SENSITIVITY: 2.1 UIU/ML (ref 0.27–4.2)
WBC # BLD: 8.5 K/CU MM (ref 4–10.5)

## 2022-03-16 PROCEDURE — 80061 LIPID PANEL: CPT

## 2022-03-16 PROCEDURE — 84443 ASSAY THYROID STIM HORMONE: CPT

## 2022-03-16 PROCEDURE — 36415 COLL VENOUS BLD VENIPUNCTURE: CPT

## 2022-03-16 PROCEDURE — 80053 COMPREHEN METABOLIC PANEL: CPT

## 2022-03-16 PROCEDURE — 85025 COMPLETE CBC W/AUTO DIFF WBC: CPT

## 2022-04-01 ENCOUNTER — OFFICE VISIT (OUTPATIENT)
Dept: CARDIOLOGY CLINIC | Age: 82
End: 2022-04-01
Payer: MEDICARE

## 2022-04-01 VITALS
DIASTOLIC BLOOD PRESSURE: 80 MMHG | BODY MASS INDEX: 23.88 KG/M2 | HEIGHT: 63 IN | HEART RATE: 81 BPM | SYSTOLIC BLOOD PRESSURE: 130 MMHG | WEIGHT: 134.8 LBS

## 2022-04-01 DIAGNOSIS — I49.3 PVCS (PREMATURE VENTRICULAR CONTRACTIONS): Primary | ICD-10-CM

## 2022-04-01 DIAGNOSIS — R06.02 SHORTNESS OF BREATH: ICD-10-CM

## 2022-04-01 PROCEDURE — 1090F PRES/ABSN URINE INCON ASSESS: CPT | Performed by: INTERNAL MEDICINE

## 2022-04-01 PROCEDURE — 1036F TOBACCO NON-USER: CPT | Performed by: INTERNAL MEDICINE

## 2022-04-01 PROCEDURE — 1123F ACP DISCUSS/DSCN MKR DOCD: CPT | Performed by: INTERNAL MEDICINE

## 2022-04-01 PROCEDURE — 93000 ELECTROCARDIOGRAM COMPLETE: CPT | Performed by: INTERNAL MEDICINE

## 2022-04-01 PROCEDURE — G8420 CALC BMI NORM PARAMETERS: HCPCS | Performed by: INTERNAL MEDICINE

## 2022-04-01 PROCEDURE — G8427 DOCREV CUR MEDS BY ELIG CLIN: HCPCS | Performed by: INTERNAL MEDICINE

## 2022-04-01 PROCEDURE — 99213 OFFICE O/P EST LOW 20 MIN: CPT | Performed by: INTERNAL MEDICINE

## 2022-04-01 PROCEDURE — 4040F PNEUMOC VAC/ADMIN/RCVD: CPT | Performed by: INTERNAL MEDICINE

## 2022-04-01 PROCEDURE — G8399 PT W/DXA RESULTS DOCUMENT: HCPCS | Performed by: INTERNAL MEDICINE

## 2022-04-01 NOTE — PROGRESS NOTES
Electrophysiology FU Note      Reason for consultation: PVC    Chief complaint :   [address]    Referring physician:  Taj Hughes      Primary care physician: Tea Morfin MD      History of Present Illness: This visit (4/1/2022)      Chief Complaint   Patient presents with   Birdie Mcghee pt here for 4 month f/u, Patient was to think about Sotalol and patient decided she does not want to take it. Patient denies any cardiac complaints, Patient does not smoke and drinks alcohol. Patient does exercise. Previous visit:(9/24/2021)      Chief Complaint   Patient presents with   Abby Mcghee patient here for 3 month f/u. Patient denies Chest Pain, Palpitations, SOB, Dizziness, Edema. Patient does not smoke and drinks alcohol occ and does not drink caffiene. Patient does exercise daily. Previous visit:(6/23/2021)      Chief Complaint   Patient presents with    New Patient     pt. here today for consult for PVC's see's Dr. Smiley Mcghee. Pt. denies chest pain, SOB, edema. Pt. denies use of tobacco and drugs. Pt. drinks maybe once a day, not always everyday. Pt. rides stationary bike some at home and walks around at stores.  Palpitations     skipping on and off.  Dizziness     sometimes. Previous visit: (10/4/2017      Chief Complaint   Patient presents with    Tachycardia     Pt is here for VT. Pt states she woke up today with dizziness and nausea. Pt states it felt like her heart was fluttering. Pt denies chest pain, shortness of breath, and edema. Previous visit:    Chief Complaint   Patient presents with    Irregular Heart Beat     Pt here to see Lupe Torres for exercise-induced frequent complex Ventricular arrhythmia. Pt also had 30-day event monitor done in June 2017.  Chest Pain     Pt states her chest pain, dizziness, & palpitations are not everyday, just occas.  Her chest pain feels like a \"fullnesss\" to mid-chest to under left breast, but does not radiate to any other areas. Pt had cardiac cath done per  on 8/3/17. Past medical history:   Past Medical History:   Diagnosis Date    Acute deep vein thrombosis (DVT) of distal vein of left lower extremity ([address] 10/23/2016    DVT involving left peroneal, posterior and anterior tibial veins 10/16 Treated for at least 3 months and repeat venous doppler was negative and was discontinued     Axillary adenopathy 8/28/2014    US of axilla 6/14 showed benign lymph nodes    C. difficile diarrhea 11/18/2016    Treated few times and now is normal    CAD (coronary artery disease)     Chest discomfort 8/1/2017    Colitis 12/13    EGD,COLONOSCOPY, SBFT normal. Dr. Anmol Benitez    Cyst of right ovary 9/6/2016    Seen Dr Avi English and had several US per pt.  Dizzy spells 6/5/2017    H/O 24 hour EKG monitoring 7/27/09    NSR, few PVCs and occasional couplet noted (total 1007 PVCs), no pauses noted.  H/O colonoscopy 10/09, 12/13    f/u in 5 years    H/O echocardiogram 9/17/12    Normal LV size and function, mild mitral and tricuspid regurg., EF 60%.  H/O mitral valve prolapse 2003    MR mild    H/O myocardial perfusion scan 12/16/14    Stress Cardiolite. Normal perfusion in the distribution of all coronaries, normal LV size and function, EF 70%.  H/O screening mammography 8/11    Dr Hernandez Post History of cardiac monitoring 06/05/2017    Abnormal-SR with complex ventricular arrhythmias and frequent PVCs. No symptoms during marked bradycardia or during the fastest rate of 143 with nonsustained three-beat runs of ventricular tachycardia.  History of cardiac monitoring 08/23/2017    Abnormal event monitor findings suggestive of predominantly sinus bradycardia with isolated and frequent low-grade ventricular ectopy with symptoms reported.      Hx of Doppler ultrasound 10/17/2017    duplex venous doppler-minimal reflux noted in the left CFV    Hyperlipidemia     Hypertension     Hypothyroidism     Liver dysfunction     liver bx 10/09,mild steaotosis    Osteoarthritis of cervical spine 6/7/2016    Ovarian cyst     Papanicolaou smear 09/14/2016    Dr. Diego Delgado PVCs (premature ventricular contractions)     symptomatic frequent PVCs, sees Dr. Shae Gillette Ventricular tachycardia ([facility] [address] 8/1/2017    Exercise induced 8/1/17       Surgical history :   Past Surgical History:   Procedure Laterality Date    APPENDECTOMY      BREAST BIOPSY      CATARACT REMOVAL Bilateral     COLONOSCOPY  12/3/13,2-28-17 2.28.17 no need for follow up due to pt's advanced age per Dr. Hero Soto.  DIAGNOSTIC CARDIAC CATH LAB PROCEDURE  08/03/2017    Normal coronaries, EF 70%.  HYSTERECTOMY  1961    LIVER BIOPSY  10/09    steasosis    UPPER GASTROINTESTINAL ENDOSCOPY  11/02/2017    Dr. Hero Soto       Family history:   Family History   Problem Relation Age of Onset    Heart Surgery Mother         CABG    Rheum Arthritis Mother     Osteoarthritis Mother     Hypertension Mother     High Cholesterol Mother     Migraines Mother     Stroke Mother     Heart Disease Mother     Heart Disease Brother     Heart Attack Father     Heart Disease Father     Heart Failure Maternal Grandmother     Heart Disease Brother        Social history :  reports that she has never smoked. She has never used smokeless tobacco. She reports current alcohol use. She reports that she does not use drugs. Allergies   Allergen Reactions    Amiodarone      Severe hair loss    Amoxicillin Other (See Comments)     Pt states she got C-diff. , so she doesn't like atb       Current Outpatient Medications on File Prior to Visit   Medication Sig Dispense Refill    benazepril (LOTENSIN) 10 MG tablet Take 1 tablet by mouth daily 30 tablet 5    atorvastatin (LIPITOR) 10 MG tablet TAKE 1 TABLET EVERY DAY 90 tablet 1    dicyclomine (BENTYL) 10 MG capsule Take 1 capsule by mouth 4 times daily as needed (abdominal cramps) 120 capsule 1    levothyroxine (SYNTHROID) 50 MCG tablet TAKE 1 TABLET BY MOUTH DAILY 90 tablet 1    docusate sodium (COLACE) 100 MG capsule Take 1 capsule by mouth daily as needed for Constipation 30 capsule 2    aspirin 81 MG tablet Take 81 mg by mouth daily Take 2 tabs by mouth in am and one tab at hs      Magnesium 125 MG CAPS Take by mouth daily      lactobacillus (CULTURELLE) capsule Take 1 capsule by mouth 2 times daily (with meals) 30 capsule 0    Coenzyme Q10 (CO Q 10) 100 MG CAPS Take by mouth daily       Cholecalciferol (VITAMIN D3) 2000 UNITS CAPS Take 1 capsule by mouth daily       Multiple Vitamins-Minerals (MULTIVITAMIN & MINERAL PO) Take 1 tablet by mouth daily      calcium carbonate (OSCAL) 500 MG TABS tablet Take 500 mg by mouth daily       No current facility-administered medications on file prior to visit. Review of Systems:   Review of Systems   Constitutional: Negative for activity change, chills, and fever. tiredness and fatigue  HENT: Negative for congestion, ear pain and tinnitus. Eyes: Negative for photophobia, pain and visual disturbance. Respiratory: Negative for cough, chest tightness, shortness of breath and wheezing. Cardiovascular: Denies palpitations denies chest pain  Gastrointestinal: Negative for abdominal pain, blood in stool, constipation, diarrhea, nausea and vomiting. Endocrine: Negative for cold intolerance and heat intolerance. Genitourinary: Negative for dysuria, flank pain and hematuria. Musculoskeletal: Positive for arthralgias. Negative for back pain, myalgias and neck stiffness. Skin: Negative for color change and rash. Allergic/Immunologic: Negative for food allergies. Neurological: Negative for numbness and headaches. Occasional dizziness  Hematological: Does not bruise/bleed easily. Psychiatric/Behavioral: Negative for agitation, behavioral problems and confusion.            Physical Examination:    /80 Pulse 81   Ht 5' 3\" (1.6 m)   Wt 134 lb 12.8 oz (61.1 kg)   BMI 23.88 kg/m²    Wt Readings from Last 3 Encounters:   04/01/22 134 lb 12.8 oz (61.1 kg)   02/23/22 135 lb 3.2 oz (61.3 kg)   12/07/21 136 lb 3.2 oz (61.8 kg)     Body mass index is 23.88 kg/m². Physical Exam   Constitutional: She is oriented to person, place, and time and well-developed, well-nourished, and in no distress. HENT:   Head: Normocephalic and atraumatic. Eyes: Conjunctivae and EOM are normal. Pupils are equal, round, and reactive to light. Right eye exhibits no discharge. Neck: Normal range of motion. No JVD present. No thyromegaly present. Cardiovascular: Normal rate, regular rhythm and normal heart sounds. Exam reveals no friction rub. No murmur heard. Pulmonary/Chest: Effort normal and breath sounds normal. No stridor. No respiratory distress. She has no wheezes. Abdominal: Soft. Bowel sounds are normal. She exhibits no distension. There is no tenderness. Musculoskeletal: Normal range of motion. She exhibits no edema or tenderness. Neurological: She is alert and oriented to person, place, and time. She displays normal reflexes. No cranial nerve deficit. Coordination normal.   Skin: Skin is warm and dry. No rash noted. No erythema.    Psychiatric: Mood and affect normal.         CBC:   Lab Results   Component Value Date    WBC 8.5 03/16/2022    HGB 13.7 03/16/2022    HCT 41.7 03/16/2022     03/16/2022     Lipids:   Lab Results   Component Value Date    CHOL 137 02/11/2017    TRIG 62 02/11/2017    HDL 66 03/16/2022    LDLCALC 60 03/16/2022    LDLDIRECT 64 12/04/2020     PT/INR:   Lab Results   Component Value Date    INR 0.91 10/17/2017        BMP:    Lab Results   Component Value Date     03/16/2022    K 4.8 03/16/2022     03/16/2022    CO2 23 03/16/2022    BUN 22 03/16/2022     CMP:   Lab Results   Component Value Date    AST 17 03/16/2022    PROT 7.0 03/16/2022    BILITOT 0.4 03/16/2022 ALKPHOS 77 03/16/2022     TSH:    Lab Results   Component Value Date    TSH 1.22 05/25/2021       EKGINTERPRETATION - EKG Interpretation:  Sinus rhythm frequent PVC      IMPRESSION / RECOMMENDATIONS:     1. Exercise induced VT and PVCs  2. htn  3. hld  4. Bradycardia      Patient here with PVCs and nonsustained VT. On the EKG also she is having multiple PVCs and short runs of NSVT. I discussed with the patient about considering the therapy for rhythm issues and patient still feels that she does not want ablation, she does not want to have sotalol or any medication. She wants to continue with magnesium. Patient only takes 125 milligrams as that is what she wants to take and she does not want to take 250 and she usually prefers taking over-the-counter. I tried to explain the patient about antiarrhythmic again but patient is not interested. Patient does say that she has some shortness of breath with moderate exertion at times and I told about the risk of sudden death and I discussed about all of the risks with PVCs and nonsustained ventricular tachycardia but patient has made up his mind about not trying to do as she feels reasonable at this time    We will get an echocardiogram to assess EF and patient is okay with that and wants to consider it. She understands all the risks. I explained her several times before too    Patient still wants to keep the follow-up - I told her that I would not be changing my recommendations and she will be needing either medications or ablation but she reports that she likes me and she likes to talk and discuss and she wants to keep the follow-ups. But meanwhile she will think about the medication versus ablation and will let us know if she changes her mind    Discussed with the patient to call us back when she is ready to discuss medications vs ablation    Discussed to avoid strenuous activity. Thanks again for allowing me to participate in care of this patient.  Please call me if you have any questions. With best regards.       Wes Nuñez MD

## 2022-04-08 LAB — MAMMOGRAPHY, EXTERNAL: NORMAL

## 2022-04-13 ENCOUNTER — PROCEDURE VISIT (OUTPATIENT)
Dept: CARDIOLOGY CLINIC | Age: 82
End: 2022-04-13
Payer: MEDICARE

## 2022-04-13 DIAGNOSIS — R00.2 HEART PALPITATIONS: ICD-10-CM

## 2022-04-13 DIAGNOSIS — I49.3 PVCS (PREMATURE VENTRICULAR CONTRACTIONS): ICD-10-CM

## 2022-04-13 DIAGNOSIS — I10 ESSENTIAL HYPERTENSION: ICD-10-CM

## 2022-04-13 DIAGNOSIS — R06.02 SHORTNESS OF BREATH: ICD-10-CM

## 2022-04-13 LAB
LV EF: 58 %
LVEF MODALITY: NORMAL

## 2022-04-13 PROCEDURE — 93306 TTE W/DOPPLER COMPLETE: CPT | Performed by: INTERNAL MEDICINE

## 2022-04-14 ENCOUNTER — TELEPHONE (OUTPATIENT)
Dept: CARDIOLOGY CLINIC | Age: 82
End: 2022-04-14

## 2022-05-25 ENCOUNTER — OFFICE VISIT (OUTPATIENT)
Dept: INTERNAL MEDICINE CLINIC | Age: 82
End: 2022-05-25
Payer: MEDICARE

## 2022-05-25 VITALS
SYSTOLIC BLOOD PRESSURE: 106 MMHG | DIASTOLIC BLOOD PRESSURE: 66 MMHG | OXYGEN SATURATION: 99 % | WEIGHT: 133.6 LBS | HEART RATE: 68 BPM | BODY MASS INDEX: 23.67 KG/M2 | RESPIRATION RATE: 16 BRPM | TEMPERATURE: 97 F

## 2022-05-25 DIAGNOSIS — I49.3 PVCS (PREMATURE VENTRICULAR CONTRACTIONS): ICD-10-CM

## 2022-05-25 DIAGNOSIS — I47.20 VENTRICULAR TACHYCARDIA: ICD-10-CM

## 2022-05-25 DIAGNOSIS — E78.2 MIXED HYPERLIPIDEMIA: ICD-10-CM

## 2022-05-25 DIAGNOSIS — K52.9 COLITIS: ICD-10-CM

## 2022-05-25 DIAGNOSIS — I10 ESSENTIAL HYPERTENSION: ICD-10-CM

## 2022-05-25 DIAGNOSIS — E03.9 ACQUIRED HYPOTHYROIDISM: ICD-10-CM

## 2022-05-25 PROCEDURE — 99214 OFFICE O/P EST MOD 30 MIN: CPT | Performed by: INTERNAL MEDICINE

## 2022-05-25 PROCEDURE — 1036F TOBACCO NON-USER: CPT | Performed by: INTERNAL MEDICINE

## 2022-05-25 PROCEDURE — G8399 PT W/DXA RESULTS DOCUMENT: HCPCS | Performed by: INTERNAL MEDICINE

## 2022-05-25 PROCEDURE — 1123F ACP DISCUSS/DSCN MKR DOCD: CPT | Performed by: INTERNAL MEDICINE

## 2022-05-25 PROCEDURE — 1090F PRES/ABSN URINE INCON ASSESS: CPT | Performed by: INTERNAL MEDICINE

## 2022-05-25 PROCEDURE — G8427 DOCREV CUR MEDS BY ELIG CLIN: HCPCS | Performed by: INTERNAL MEDICINE

## 2022-05-25 PROCEDURE — G8420 CALC BMI NORM PARAMETERS: HCPCS | Performed by: INTERNAL MEDICINE

## 2022-05-25 ASSESSMENT — PATIENT HEALTH QUESTIONNAIRE - PHQ9
SUM OF ALL RESPONSES TO PHQ9 QUESTIONS 1 & 2: 0
SUM OF ALL RESPONSES TO PHQ QUESTIONS 1-9: 0
2. FEELING DOWN, DEPRESSED OR HOPELESS: 0
1. LITTLE INTEREST OR PLEASURE IN DOING THINGS: 0
SUM OF ALL RESPONSES TO PHQ QUESTIONS 1-9: 0

## 2022-05-25 NOTE — PROGRESS NOTES
Jason Serrano  Patient's  is 1940  Seen in office on 2022      SUBJECTIVE:  Kimberly Srivastava keena 78 y. o.year old female presents today   Chief Complaint   Patient presents with    Follow-up    Other     worried about short term memory     Pt is here for f/u of HTN HLD   Pt is worried about her short term memory   Pt states she forgot one time remember where she bought her car  No problems driving. Did not lose her way while driving. Some times she forgets why she came to the room      Patient has hypertension. Taking medications No headaches, no chest pain, no palpitations and no dizziness. Patient has hyperlipidemia. Taking medications. No abdominal pain, no nausea or vomiting. No myalgias. Taking medications regularly. No side effects noted. Review of Systems    Review of system is normal except as in HPI    OBJECTIVE: /66   Pulse 68   Temp 97 °F (36.1 °C) (Temporal)   Resp 16   Wt 133 lb 9.6 oz (60.6 kg)   SpO2 99%   BMI 23.67 kg/m²     Wt Readings from Last 3 Encounters:   22 133 lb 9.6 oz (60.6 kg)   22 134 lb 12.8 oz (61.1 kg)   22 135 lb 3.2 oz (61.3 kg)      Patient was seen taking COVID-19 precautions. Face mask, gloves were used. Patient also wore facemask. GENERAL: - Alert, oriented, pleasant, in no apparent distress. HEENT: - Conjunctiva pink, no scleral icterus. ENT clear. NECK: -Supple. No jugular venous distention noted. No masses felt,  CARDIOVASCULAR: - Normal S1 and S2    PULMONARY: - No respiratory distress. No wheezes or rales. ABDOMEN: - Soft and non-tender,no masses  ororganomegaly. EXTREMITIES: - No cyanosis, clubbing, or significant edema. SKIN: Skin is warm and dry. NEUROLOGICAL: - Cranial nerves II through XII are grossly intact. IMPRESSION:    Encounter Diagnoses   Name Primary?     Essential hypertension     Mixed hyperlipidemia     PVCs (premature ventricular contractions)     Acquired hypothyroidism     Colitis     Ventricular tachycardia (HCC)        ASSESSMENT/PLAN:    Essential hypertension   Hypertension in control. Follow low salt diet. Continue current treatment. On benzapril 10 mg daily    Mixed hyperlipidemia    Patient on lipitor 10 mg daily  for hyperlipidemia  Lipid profile is good    PVCs (premature ventricular contractions)    : pt seen Dr Astrid Vela  Not on any medications . Acquired hypothyroidism    Pt is taking synthroid 50 mcg daily    Colitis    Dr. Melani Bender saw patient . Had colonosocpy 11/13 and SBFT   Pt takes bentyl prn. Has not used it for some time. Ventricular tachycardia (HCC)    Exercise induced 8/1/17Cath normal coronories  Referred to Dr Astrid Vela. Recommended EP study  He is planning to start her on some medication in hospital.   She will decide : but patient decide against it     Mild cognitive impairment : MME 29/30  Brain exercise explained  Advised pt to do sudoko, crossword puzzles etc     Return to office in 3 months. Mediations reviewed with the patient. Continue current medications. Appropriate prescriptions are addressed. After visit summeryprovided. Follow up as directed sooner if needed. Questions answered and patient verbalizes understanding. Call for any problems, questions, or concerns. Allergies   Allergen Reactions    Amiodarone      Severe hair loss    Amoxicillin Other (See Comments)     Pt states she got C-diff. , so she doesn't like atb     Current Outpatient Medications   Medication Sig Dispense Refill    Magnesium 125 MG CAPS Take 250 mg by mouth daily 90 capsule 1    benazepril (LOTENSIN) 10 MG tablet Take 1 tablet by mouth daily 30 tablet 5    atorvastatin (LIPITOR) 10 MG tablet TAKE 1 TABLET EVERY DAY 90 tablet 1    dicyclomine (BENTYL) 10 MG capsule Take 1 capsule by mouth 4 times daily as needed (abdominal cramps) 120 capsule 1    levothyroxine (SYNTHROID) 50 MCG tablet TAKE 1 TABLET BY MOUTH DAILY 90 tablet 1    aspirin 81 MG tablet Take 81 mg by mouth daily Take 2 tabs by mouth in am and one tab at hs      lactobacillus (CULTURELLE) capsule Take 1 capsule by mouth 2 times daily (with meals) 30 capsule 0    Coenzyme Q10 (CO Q 10) 100 MG CAPS Take by mouth daily       Cholecalciferol (VITAMIN D3) 2000 UNITS CAPS Take 1 capsule by mouth daily       Multiple Vitamins-Minerals (MULTIVITAMIN & MINERAL PO) Take 1 tablet by mouth daily      calcium carbonate (OSCAL) 500 MG TABS tablet Take 500 mg by mouth daily      docusate sodium (COLACE) 100 MG capsule Take 1 capsule by mouth daily as needed for Constipation 30 capsule 2     No current facility-administered medications for this visit. Past Medical History:   Diagnosis Date    Acute deep vein thrombosis (DVT) of distal vein of left lower extremity ([address] 10/23/2016    DVT involving left peroneal, posterior and anterior tibial veins 10/16 Treated for at least 3 months and repeat venous doppler was negative and was discontinued     Axillary adenopathy 8/28/2014    US of axilla 6/14 showed benign lymph nodes    C. difficile diarrhea 11/18/2016    Treated few times and now is normal    CAD (coronary artery disease)     Chest discomfort 8/1/2017    Colitis 12/13    EGD,COLONOSCOPY, SBFT normal. Dr. Haley Welch    Cyst of right ovary 9/6/2016    Seen Dr Lizbeth Martinez and had several US per pt.  Dizzy spells 6/5/2017    H/O 24 hour EKG monitoring 7/27/09    NSR, few PVCs and occasional couplet noted (total 1007 PVCs), no pauses noted.  H/O colonoscopy 10/09, 12/13    f/u in 5 years    H/O echocardiogram 9/17/12    Normal LV size and function, mild mitral and tricuspid regurg., EF 60%.  H/O mitral valve prolapse 2003    MR mild    H/O myocardial perfusion scan 12/16/14    Stress Cardiolite. Normal perfusion in the distribution of all coronaries, normal LV size and function, EF 70%.     H/O screening mammography 8/11    Dr Srini Marx History of cardiac monitoring 06/05/2017    Abnormal-SR with complex ventricular arrhythmias and frequent PVCs. No symptoms during marked bradycardia or during the fastest rate of 143 with nonsustained three-beat runs of ventricular tachycardia.  History of cardiac monitoring 08/23/2017    Abnormal event monitor findings suggestive of predominantly sinus bradycardia with isolated and frequent low-grade ventricular ectopy with symptoms reported.  Hx of Doppler ultrasound 10/17/2017    duplex venous doppler-minimal reflux noted in the left CFV    Hyperlipidemia     Hypertension     Hypothyroidism     Liver dysfunction     liver bx 10/09,mild steaotosis    Osteoarthritis of cervical spine 6/7/2016    Ovarian cyst     Papanicolaou smear 09/14/2016    Dr. Laboy Mercury PVCs (premature ventricular contractions)     symptomatic frequent PVCs, sees Dr. Valentin Doty Ventricular tachycardia ([facility] [address] 8/1/2017    Exercise induced 8/1/17     Past Surgical History:   Procedure Laterality Date    APPENDECTOMY      BREAST BIOPSY      CATARACT REMOVAL Bilateral     COLONOSCOPY  12/3/13,2-28-17 2.28.17 no need for follow up due to pt's advanced age per Dr. Brenda Levi.  DIAGNOSTIC CARDIAC CATH LAB PROCEDURE  08/03/2017    Normal coronaries, EF 70%.  HYSTERECTOMY  1961    LIVER BIOPSY  10/09    steasosis    UPPER GASTROINTESTINAL ENDOSCOPY  11/02/2017    Dr. Breen Mean History     Tobacco Use    Smoking status: Never Smoker    Smokeless tobacco: Never Used   Substance Use Topics    Alcohol use:  Yes     Alcohol/week: 0.0 standard drinks     Comment:  1 beers per day or 2 glasses of \"bubbly\"/day       LAB REVIEW:  CBC:   Lab Results   Component Value Date    WBC 8.5 03/16/2022    HGB 13.7 03/16/2022    HCT 41.7 03/16/2022     03/16/2022     Lipids:   Lab Results   Component Value Date    HDL 66 03/16/2022    LDLCALC 60 03/16/2022    LDLDIRECT 64 12/04/2020    TRIGLYCFAST 87 03/16/2022    CHOLFAST 143 03/16/2022     Renal:   Lab Results   Component Value Date    BUN 22 03/16/2022 CREATININE 1.0 03/16/2022     03/16/2022    K 4.8 03/16/2022    ALT 22 03/16/2022    AST 17 03/16/2022    GLUCOSE 94 05/25/2021    GLUF 105 03/16/2022     PT/INR:   Lab Results   Component Value Date    INR 0.91 10/17/2017     A1C:   Lab Results   Component Value Date    LABA1C 5.5 11/13/2019           Payal De La Rosa MD, 5/25/2022 , 9:30 AM

## 2022-06-03 ENCOUNTER — TELEMEDICINE (OUTPATIENT)
Dept: INTERNAL MEDICINE CLINIC | Age: 82
End: 2022-06-03
Payer: MEDICARE

## 2022-06-03 DIAGNOSIS — Z00.00 MEDICARE ANNUAL WELLNESS VISIT, SUBSEQUENT: Primary | ICD-10-CM

## 2022-06-03 PROCEDURE — 1123F ACP DISCUSS/DSCN MKR DOCD: CPT | Performed by: INTERNAL MEDICINE

## 2022-06-03 PROCEDURE — G0439 PPPS, SUBSEQ VISIT: HCPCS | Performed by: INTERNAL MEDICINE

## 2022-06-03 SDOH — ECONOMIC STABILITY: FOOD INSECURITY: WITHIN THE PAST 12 MONTHS, YOU WORRIED THAT YOUR FOOD WOULD RUN OUT BEFORE YOU GOT MONEY TO BUY MORE.: NEVER TRUE

## 2022-06-03 SDOH — ECONOMIC STABILITY: FOOD INSECURITY: WITHIN THE PAST 12 MONTHS, THE FOOD YOU BOUGHT JUST DIDN'T LAST AND YOU DIDN'T HAVE MONEY TO GET MORE.: NEVER TRUE

## 2022-06-03 ASSESSMENT — SOCIAL DETERMINANTS OF HEALTH (SDOH): HOW HARD IS IT FOR YOU TO PAY FOR THE VERY BASICS LIKE FOOD, HOUSING, MEDICAL CARE, AND HEATING?: NOT HARD AT ALL

## 2022-06-03 ASSESSMENT — PATIENT HEALTH QUESTIONNAIRE - PHQ9
SUM OF ALL RESPONSES TO PHQ QUESTIONS 1-9: 0
SUM OF ALL RESPONSES TO PHQ9 QUESTIONS 1 & 2: 0
SUM OF ALL RESPONSES TO PHQ QUESTIONS 1-9: 0
1. LITTLE INTEREST OR PLEASURE IN DOING THINGS: 0
SUM OF ALL RESPONSES TO PHQ QUESTIONS 1-9: 0
SUM OF ALL RESPONSES TO PHQ QUESTIONS 1-9: 0
2. FEELING DOWN, DEPRESSED OR HOPELESS: 0

## 2022-06-03 ASSESSMENT — LIFESTYLE VARIABLES
HOW OFTEN DO YOU HAVE A DRINK CONTAINING ALCOHOL: 2-4 TIMES A MONTH
HOW MANY STANDARD DRINKS CONTAINING ALCOHOL DO YOU HAVE ON A TYPICAL DAY: 1 OR 2

## 2022-06-03 NOTE — PROGRESS NOTES
Medicare Annual Wellness Visit    Miracle Lawson is here for Medicare AWV    Assessment & Plan   Medicare annual wellness visit, subsequent      Recommendations for Preventive Services Due: see orders and patient instructions/AVS.  Recommended screening schedule for the next 5-10 years is provided to the patient in written form: see Patient Instructions/AVS.     No follow-ups on file. Subjective       Patient's complete Health Risk Assessment and screening values have been reviewed and are found in Flowsheets. The following problems were reviewed today and where indicated follow up appointments were made and/or referrals ordered.     Positive Risk Factor Screenings with Interventions:             General Health and ACP:  General  In general, how would you say your health is?: Very Good  In the past 7 days, have you experienced any of the following: New or Increased Pain, New or Increased Fatigue, Loneliness, Social Isolation, Stress or Anger?: No  Do you get the social and emotional support that you need?: Yes  Do you have a Living Will?: (!) No    Advance Directives     Power of  Living Will ACP-Advance Directive ACP-Power of     Not on File Not on File Not on File Not on File      General Health Risk Interventions:  · No Living Will: Patient declines ACP discussion/assistance      Safety:  Do you have working smoke detectors?: Yes  Do you have any tripping hazards - loose or unsecured carpets or rugs?: (!) Yes  Do you have any tripping hazards - clutter in doorways, halls, or stairs?: No  Do you have either shower bars, grab bars, non-slip mats or non-slip surfaces in your shower or bathtub?: Yes  Do all of your stairways have a railing or banister?: Yes  Do you always fasten your seatbelt when you are in a car?: Yes    Safety Interventions:  · Home safety tips provided verbally           Objective      Patient-Reported Vitals  No data recorded     Unable to obtain 3 vital signs due to patient not having equipment to take blood pressure/temperature. Allergies   Allergen Reactions    Amiodarone      Severe hair loss    Amoxicillin Other (See Comments)     Pt states she got C-diff. , so she doesn't like atb     Prior to Visit Medications    Medication Sig Taking?  Authorizing Provider   Magnesium 125 MG CAPS Take 250 mg by mouth daily Yes Phani Sepulveda MD   benazepril (LOTENSIN) 10 MG tablet Take 1 tablet by mouth daily Yes Cassidy Martinez MD   atorvastatin (LIPITOR) 10 MG tablet TAKE 1 TABLET EVERY DAY Yes Cassidy Martinez MD   dicyclomine (BENTYL) 10 MG capsule Take 1 capsule by mouth 4 times daily as needed (abdominal cramps) Yes Cassidy Martinez MD   levothyroxine (SYNTHROID) 50 MCG tablet TAKE 1 TABLET BY MOUTH DAILY Yes Cassidy Martinez MD   docusate sodium (COLACE) 100 MG capsule Take 1 capsule by mouth daily as needed for Constipation Yes Cassidy Martinez MD   aspirin 81 MG tablet Take 81 mg by mouth daily Take 2 tabs by mouth in am and one tab at hs Yes Historical Provider, MD   lactobacillus (CULTURELLE) capsule Take 1 capsule by mouth 2 times daily (with meals) Yes Cj Suarez MD   Coenzyme Q10 (CO Q 10) 100 MG CAPS Take by mouth daily  Yes Historical Provider, MD   Cholecalciferol (VITAMIN D3) 2000 UNITS CAPS Take 1 capsule by mouth daily  Yes Historical Provider, MD   Multiple Vitamins-Minerals (MULTIVITAMIN & MINERAL PO) Take 1 tablet by mouth daily Yes Historical Provider, MD   calcium carbonate (OSCAL) 500 MG TABS tablet Take 500 mg by mouth daily Yes Historical Provider, MD [place] (Including outside providers/suppliers regularly involved in providing care):   Patient Care Team:  Cassidy Martinez MD as PCP - General (Internal Medicine)  Cassidy Martinez MD as PCP - [facility] Empaneled Provider  Edward Cruz MD as Consulting Physician (Cardiology)  Liv Dawn MD as Consulting Physician (Gastroenterology)  Phani Sepulveda MD as Consulting Physician (Electrophysiology)     Reviewed and updated this visit:  Tobacco  Allergies  Meds  Med Hx  Surg Hx  Soc Hx  Fam Hx             I, Oma Hartmann LPN, 8/1/3804, performed the documented evaluation under the direct supervision of the attending physician. Mazin Hussein, was evaluated through a synchronous (real-time) audio encounter. The patient (or guardian if applicable) is aware that this is a billable service, which includes applicable co-pays. This Virtual Visit was conducted with patient's (and/or legal guardian's) consent. The visit was conducted pursuant to the emergency declaration under the [facility], [facility] waiver authority and the [employer] and [employer] General Act. Patient identification was verified, and a caregiver was present when appropriate. The patient was located at Home: [place] [address]. Provider was located at [facility] (Appt Dept): [address] [place] [address]. [address] [place] [address],  [address] [facility]. Total time spent for this encounter: Not billed by time    --Oma Hartmann LPN on 7/7/8143 at 1:99 AM    An electronic signature was used to authenticate this note.

## 2022-06-03 NOTE — PATIENT INSTRUCTIONS
Personalized Preventive Plan for Jay Lepe - 6/3/2022  Medicare offers a range of preventive health benefits. Some of the tests and screenings are paid in full while other may be subject to a deductible, co-insurance, and/or copay. Some of these benefits include a comprehensive review of your medical history including lifestyle, illnesses that may run in your family, and various assessments and screenings as appropriate. After reviewing your medical record and screening and assessments performed today your provider may have ordered immunizations, labs, imaging, and/or referrals for you. A list of these orders (if applicable) as well as your Preventive Care list are included within your After Visit Summary for your review. Other Preventive Recommendations:    · A preventive eye exam performed by an eye specialist is recommended every 1-2 years to screen for glaucoma; cataracts, macular degeneration, and other eye disorders. · A preventive dental visit is recommended every 6 months. · Try to get at least 150 minutes of exercise per week or 10,000 steps per day on a pedometer . · Order or download the FREE \"Exercise & Physical Activity: Your Everyday Guide\" from [employer]. Call 9-482.574.9246 or search [employer] online. · You need 1313-1018 mg of calcium and 6434-4551 IU of vitamin D per day. It is possible to meet your calcium requirement with diet alone, but a vitamin D supplement is usually necessary to meet this goal.  · When exposed to the sun, use a sunscreen that protects against both UVA and UVB radiation with an SPF of 30 or greater. Reapply every 2 to 3 hours or after sweating, drying off with a towel, or swimming. · Always wear a seat belt when traveling in a car. Always wear a helmet when riding a bicycle or motorcycle.

## 2022-06-16 ENCOUNTER — HOSPITAL ENCOUNTER (OUTPATIENT)
Dept: GENERAL RADIOLOGY | Age: 82
Discharge: HOME | End: 2022-06-16
Payer: MEDICARE

## 2022-06-16 DIAGNOSIS — K58.9 IRRITABLE BOWEL SYNDROME, UNSPECIFIED TYPE: ICD-10-CM

## 2022-06-16 DIAGNOSIS — R10.13 ABDOMINAL PAIN, EPIGASTRIC: ICD-10-CM

## 2022-06-16 PROCEDURE — 74248 X-RAY SM INT F-THRU STD: CPT

## 2022-08-22 RX ORDER — BENAZEPRIL HYDROCHLORIDE 10 MG/1
TABLET ORAL
Qty: 90 TABLET | Refills: 1 | Status: SHIPPED | OUTPATIENT
Start: 2022-08-22

## 2022-08-31 ENCOUNTER — OFFICE VISIT (OUTPATIENT)
Dept: INTERNAL MEDICINE CLINIC | Age: 82
End: 2022-08-31
Payer: MEDICARE

## 2022-08-31 VITALS
WEIGHT: 133.8 LBS | SYSTOLIC BLOOD PRESSURE: 118 MMHG | BODY MASS INDEX: 23.7 KG/M2 | TEMPERATURE: 97 F | OXYGEN SATURATION: 97 % | RESPIRATION RATE: 16 BRPM | HEART RATE: 68 BPM | DIASTOLIC BLOOD PRESSURE: 66 MMHG

## 2022-08-31 DIAGNOSIS — E78.2 MIXED HYPERLIPIDEMIA: ICD-10-CM

## 2022-08-31 DIAGNOSIS — I49.3 PVCS (PREMATURE VENTRICULAR CONTRACTIONS): ICD-10-CM

## 2022-08-31 DIAGNOSIS — I10 ESSENTIAL HYPERTENSION: ICD-10-CM

## 2022-08-31 DIAGNOSIS — K76.89 LIVER DYSFUNCTION: ICD-10-CM

## 2022-08-31 DIAGNOSIS — R00.2 HEART PALPITATIONS: ICD-10-CM

## 2022-08-31 DIAGNOSIS — E03.9 ACQUIRED HYPOTHYROIDISM: ICD-10-CM

## 2022-08-31 DIAGNOSIS — M81.0 AGE-RELATED OSTEOPOROSIS WITHOUT CURRENT PATHOLOGICAL FRACTURE: ICD-10-CM

## 2022-08-31 PROCEDURE — G8427 DOCREV CUR MEDS BY ELIG CLIN: HCPCS | Performed by: INTERNAL MEDICINE

## 2022-08-31 PROCEDURE — 1090F PRES/ABSN URINE INCON ASSESS: CPT | Performed by: INTERNAL MEDICINE

## 2022-08-31 PROCEDURE — G8399 PT W/DXA RESULTS DOCUMENT: HCPCS | Performed by: INTERNAL MEDICINE

## 2022-08-31 PROCEDURE — 1036F TOBACCO NON-USER: CPT | Performed by: INTERNAL MEDICINE

## 2022-08-31 PROCEDURE — G8420 CALC BMI NORM PARAMETERS: HCPCS | Performed by: INTERNAL MEDICINE

## 2022-08-31 PROCEDURE — 1123F ACP DISCUSS/DSCN MKR DOCD: CPT | Performed by: INTERNAL MEDICINE

## 2022-08-31 PROCEDURE — 99214 OFFICE O/P EST MOD 30 MIN: CPT | Performed by: INTERNAL MEDICINE

## 2022-08-31 RX ORDER — ATORVASTATIN CALCIUM 10 MG/1
TABLET, FILM COATED ORAL
Qty: 90 TABLET | Refills: 1 | Status: SHIPPED | OUTPATIENT
Start: 2022-08-31

## 2022-08-31 RX ORDER — LEVOTHYROXINE SODIUM 0.05 MG/1
TABLET ORAL
Qty: 90 TABLET | Refills: 1 | Status: SHIPPED | OUTPATIENT
Start: 2022-08-31

## 2022-08-31 ASSESSMENT — PATIENT HEALTH QUESTIONNAIRE - PHQ9
2. FEELING DOWN, DEPRESSED OR HOPELESS: 0
SUM OF ALL RESPONSES TO PHQ QUESTIONS 1-9: 0
1. LITTLE INTEREST OR PLEASURE IN DOING THINGS: 0
SUM OF ALL RESPONSES TO PHQ QUESTIONS 1-9: 0
SUM OF ALL RESPONSES TO PHQ QUESTIONS 1-9: 0
SUM OF ALL RESPONSES TO PHQ9 QUESTIONS 1 & 2: 0
SUM OF ALL RESPONSES TO PHQ QUESTIONS 1-9: 0

## 2022-08-31 NOTE — ASSESSMENT & PLAN NOTE
Dexa scan : 4/2022 : osteopenia right hip and osteoprorosis left hip neck  Pt is using calcium and vitamin D3 3000 units a day

## 2022-08-31 NOTE — PROGRESS NOTES
Jailene Sorto  Patient's  is 1940  Seen in office on 2022      SUBJECTIVE:  Yany brink 78 y. o.year old female presents today   Chief Complaint   Patient presents with    Follow-up    Medication Refill    Knee Pain     And soreness right knee    Other     \"Feels like there is swelling in my neck\"     Right knee pain after twisting right knee about month ago  Pain is better but still there  Using knee brace    Feels some swelling of the neck    Patient has hypertension. Taking medications No headaches, no chest pain, no palpitations and no dizziness. Pt denies any palpitations. No dizziness    Patient has hyperlipidemia. Taking medications. No abdominal pain, no nausea or vomiting. No myalgias. Taking medications regularly. No side effects noted. Review of Systems    OBJECTIVE: /66   Pulse 68   Temp 97 °F (36.1 °C) (Temporal)   Resp 16   Wt 133 lb 12.8 oz (60.7 kg)   SpO2 97%   BMI 23.70 kg/m²     Wt Readings from Last 3 Encounters:   22 133 lb 12.8 oz (60.7 kg)   22 133 lb 9.6 oz (60.6 kg)   22 134 lb 12.8 oz (61.1 kg)      GENERAL: - Alert, oriented, pleasant, in no apparent distress. HEENT: - Conjunctiva pink, no scleral icterus. ENT clear. NECK: -Supple. No jugular venous distention noted. No masses felt,  CARDIOVASCULAR: - Normal S1 and S2    PULMONARY: - No respiratory distress. No wheezes or rales. ABDOMEN: - Soft and non-tender,no masses  ororganomegaly. EXTREMITIES: - No cyanosis, clubbing, or significant edema. SKIN: Skin is warm and dry. NEUROLOGICAL: - Cranial nerves II through XII are grossly intact. IMPRESSION:    Encounter Diagnoses   Name Primary?     Essential hypertension     Mixed hyperlipidemia     PVCs (premature ventricular contractions)     Liver dysfunction     Acquired hypothyroidism     Heart palpitations     Age-related osteoporosis without current pathological fracture        ASSESSMENT/PLAN:    Essential hypertension BP stable on lotensin 10 mg daily . BP at home normal. No low bP    Mixed hyperlipidemia    Hyperlipidemia is stable. Follow low cholesterol diet. Continue current treatment. PVCs (premature ventricular contractions)    doing well. No palpitations. No dizziness. Liver dysfunction    Patient is stable. Continue current treatment. LFTs normal.     Acquired hypothyroidism    Patient is stable. Continue current treatment. On synthroid 50 mcg daily     Heart palpitations    Pt has PAC and PVC in the past.   30 event monitor : nothing significant. Has cardiology appt. Sees Dr Sarah Robles and Dr Ema Shirley   Doing well     Age-related osteoporosis without current pathological fracture    Dexa scan : 4/2022 : osteopenia right hip and osteoprorosis left hip neck  Pt is using calcium and vitamin D3 3000 units a day     Right knee pain medially  No tenderness and no fluid. ROm intackt   If get worse -ortho      Orders Placed This Encounter   Procedures    Lipid, Fasting    Comprehensive Metabolic Panel    TSH         Mediations reviewed with the patient. Continue current medications. Appropriate prescriptions are addressed. After visit summeryprovided. Follow up as directed sooner if needed. Questions answered and patient verbalizes understanding. Call for any problems, questions, or concerns. Allergies   Allergen Reactions    Amiodarone      Severe hair loss    Amoxicillin Other (See Comments)     Pt states she got C-diff. , so she doesn't like atb     Current Outpatient Medications   Medication Sig Dispense Refill    benazepril (LOTENSIN) 10 MG tablet TAKE 1 TABLET BY MOUTH EVERY DAY 90 tablet 1    Magnesium 125 MG CAPS Take 250 mg by mouth daily 90 capsule 1    atorvastatin (LIPITOR) 10 MG tablet TAKE 1 TABLET EVERY DAY 90 tablet 1    dicyclomine (BENTYL) 10 MG capsule Take 1 capsule by mouth 4 times daily as needed (abdominal cramps) 120 capsule 1    levothyroxine (SYNTHROID) 50 MCG tablet TAKE 1 TABLET BY MOUTH DAILY 90 tablet 1    docusate sodium (COLACE) 100 MG capsule Take 1 capsule by mouth daily as needed for Constipation 30 capsule 2    aspirin 81 MG tablet Take 81 mg by mouth daily      lactobacillus (CULTURELLE) capsule Take 1 capsule by mouth 2 times daily (with meals) 30 capsule 0    Coenzyme Q10 (CO Q 10) 100 MG CAPS Take by mouth daily       Cholecalciferol (VITAMIN D3) 2000 UNITS CAPS Take 1 capsule by mouth daily       Multiple Vitamins-Minerals (MULTIVITAMIN & MINERAL PO) Take 1 tablet by mouth daily      calcium carbonate (OSCAL) 500 MG TABS tablet Take 500 mg by mouth daily       No current facility-administered medications for this visit. Past Medical History:   Diagnosis Date    Acute deep vein thrombosis (DVT) of distal vein of left lower extremity ([address] 10/23/2016    DVT involving left peroneal, posterior and anterior tibial veins 10/16 Treated for at least 3 months and repeat venous doppler was negative and was discontinued     Axillary adenopathy 8/28/2014    US of axilla 6/14 showed benign lymph nodes    C. difficile diarrhea 11/18/2016    Treated few times and now is normal    CAD (coronary artery disease)     Chest discomfort 8/1/2017    Colitis 12/13    EGD,COLONOSCOPY, SBFT normal. Dr. Ta Jordan    Cyst of right ovary 9/6/2016    Seen Dr Chet Gonzales and had several US per pt. Dizzy spells 6/5/2017    H/O 24 hour EKG monitoring 7/27/09    NSR, few PVCs and occasional couplet noted (total 1007 PVCs), no pauses noted. H/O colonoscopy 10/09, 12/13    f/u in 5 years    H/O echocardiogram 9/17/12    Normal LV size and function, mild mitral and tricuspid regurg., EF 60%. H/O mitral valve prolapse 2003    MR mild    H/O myocardial perfusion scan 12/16/14    Stress Cardiolite. Normal perfusion in the distribution of all coronaries, normal LV size and function, EF 70%.     H/O screening mammography 8/11    Dr Jovani Beckham    History of cardiac monitoring 06/05/2017    Abnormal-SR with complex ventricular arrhythmias and frequent PVCs. No symptoms during marked bradycardia or during the fastest rate of 143 with nonsustained three-beat runs of ventricular tachycardia. History of cardiac monitoring 08/23/2017    Abnormal event monitor findings suggestive of predominantly sinus bradycardia with isolated and frequent low-grade ventricular ectopy with symptoms reported. Hx of Doppler ultrasound 10/17/2017    duplex venous doppler-minimal reflux noted in the left CFV    Hyperlipidemia     Hypertension     Hypothyroidism     Liver dysfunction     liver bx 10/09,mild steaotosis    Osteoarthritis of cervical spine 6/7/2016    Ovarian cyst     Papanicolaou smear 09/14/2016    Dr. Pablo Calvert    PVCs (premature ventricular contractions)     symptomatic frequent PVCs, sees Dr. Jm Rivera    Ventricular tachycardia [facility]) 8/1/2017    Exercise induced 8/1/17     Past Surgical History:   Procedure Laterality Date    APPENDECTOMY      BREAST BIOPSY      CATARACT REMOVAL Bilateral     COLONOSCOPY  12/3/13,2-28-17 2.28.17 no need for follow up due to pt's advanced age per Dr. Jessica Montoya. DIAGNOSTIC CARDIAC CATH LAB PROCEDURE  08/03/2017    Normal coronaries, EF 70%. HYSTERECTOMY (CERVIX STATUS UNKNOWN)  1961    LIVER BIOPSY  10/09    steasosis    UPPER GASTROINTESTINAL ENDOSCOPY  11/02/2017    Dr. Jessica Montoya     Social History     Tobacco Use    Smoking status: Never    Smokeless tobacco: Never   Substance Use Topics    Alcohol use:  Yes     Alcohol/week: 0.0 standard drinks     Comment:  1 beers per day or 2 glasses of \"bubbly\"/day       LAB REVIEW:  CBC:   Lab Results   Component Value Date/Time    WBC 8.5 03/16/2022 08:40 AM    HGB 13.7 03/16/2022 08:40 AM    HCT 41.7 03/16/2022 08:40 AM     03/16/2022 08:40 AM     Lipids:   Lab Results   Component Value Date    HDL 66 03/16/2022    LDLCALC 60 03/16/2022    LDLDIRECT 64 12/04/2020    TRIGLYCFAST 87 03/16/2022    CHOLFAST 143 03/16/2022     Renal:   Lab Results   Component Value Date/Time    BUN 22 03/16/2022 08:40 AM    CREATININE 1.0 03/16/2022 08:40 AM     03/16/2022 08:40 AM    K 4.8 03/16/2022 08:40 AM    ALT 22 03/16/2022 08:40 AM    AST 17 03/16/2022 08:40 AM    GLUCOSE 94 05/25/2021 09:28 AM    GLUF 105 03/16/2022 08:40 AM     PT/INR:   Lab Results   Component Value Date/Time    INR 0.91 10/17/2017 10:51 AM     A1C:   Lab Results   Component Value Date    LABA1C 5.5 11/13/2019           Ambrose Schultz MD, 8/31/2022 , 9:38 AM

## 2022-08-31 NOTE — ASSESSMENT & PLAN NOTE
Pt has PAC and PVC in the past.   30 event monitor : nothing significant. Has cardiology appt.    Sees Dr Neftaly Warner and Dr Stacy Dominguez   Doing well

## 2022-10-10 ENCOUNTER — HOSPITAL ENCOUNTER (OUTPATIENT)
Age: 82
Discharge: HOME | End: 2022-10-10
Payer: MEDICARE

## 2022-10-10 ENCOUNTER — OFFICE VISIT (OUTPATIENT)
Dept: INTERNAL MEDICINE CLINIC | Age: 82
End: 2022-10-10
Payer: MEDICARE

## 2022-10-10 ENCOUNTER — HOSPITAL ENCOUNTER (OUTPATIENT)
Dept: GENERAL RADIOLOGY | Age: 82
Discharge: HOME | End: 2022-10-10
Payer: MEDICARE

## 2022-10-10 VITALS
RESPIRATION RATE: 12 BRPM | OXYGEN SATURATION: 99 % | BODY MASS INDEX: 23.7 KG/M2 | WEIGHT: 133.8 LBS | TEMPERATURE: 97 F | HEART RATE: 56 BPM | SYSTOLIC BLOOD PRESSURE: 110 MMHG | DIASTOLIC BLOOD PRESSURE: 60 MMHG

## 2022-10-10 DIAGNOSIS — M54.50 ACUTE RIGHT-SIDED LOW BACK PAIN, UNSPECIFIED WHETHER SCIATICA PRESENT: ICD-10-CM

## 2022-10-10 DIAGNOSIS — M25.561 ACUTE PAIN OF RIGHT KNEE: ICD-10-CM

## 2022-10-10 DIAGNOSIS — M54.50 ACUTE RIGHT-SIDED LOW BACK PAIN, UNSPECIFIED WHETHER SCIATICA PRESENT: Primary | ICD-10-CM

## 2022-10-10 DIAGNOSIS — M54.41 ACUTE RIGHT-SIDED LOW BACK PAIN WITH RIGHT-SIDED SCIATICA: ICD-10-CM

## 2022-10-10 PROCEDURE — 1036F TOBACCO NON-USER: CPT | Performed by: INTERNAL MEDICINE

## 2022-10-10 PROCEDURE — G8427 DOCREV CUR MEDS BY ELIG CLIN: HCPCS | Performed by: INTERNAL MEDICINE

## 2022-10-10 PROCEDURE — G8399 PT W/DXA RESULTS DOCUMENT: HCPCS | Performed by: INTERNAL MEDICINE

## 2022-10-10 PROCEDURE — 99213 OFFICE O/P EST LOW 20 MIN: CPT | Performed by: INTERNAL MEDICINE

## 2022-10-10 PROCEDURE — G8420 CALC BMI NORM PARAMETERS: HCPCS | Performed by: INTERNAL MEDICINE

## 2022-10-10 PROCEDURE — 1123F ACP DISCUSS/DSCN MKR DOCD: CPT | Performed by: INTERNAL MEDICINE

## 2022-10-10 PROCEDURE — G8484 FLU IMMUNIZE NO ADMIN: HCPCS | Performed by: INTERNAL MEDICINE

## 2022-10-10 PROCEDURE — 1090F PRES/ABSN URINE INCON ASSESS: CPT | Performed by: INTERNAL MEDICINE

## 2022-10-10 PROCEDURE — 72100 X-RAY EXAM L-S SPINE 2/3 VWS: CPT

## 2022-10-10 ASSESSMENT — PATIENT HEALTH QUESTIONNAIRE - PHQ9
SUM OF ALL RESPONSES TO PHQ QUESTIONS 1-9: 0
SUM OF ALL RESPONSES TO PHQ9 QUESTIONS 1 & 2: 0
SUM OF ALL RESPONSES TO PHQ QUESTIONS 1-9: 0
SUM OF ALL RESPONSES TO PHQ QUESTIONS 1-9: 0
1. LITTLE INTEREST OR PLEASURE IN DOING THINGS: 0
SUM OF ALL RESPONSES TO PHQ QUESTIONS 1-9: 0
2. FEELING DOWN, DEPRESSED OR HOPELESS: 0

## 2022-10-10 NOTE — PROGRESS NOTES
Karol Pearson  Patient's  is 1940  Seen in office on 10/10/2022      SUBJECTIVE:  Daniel Gonzales keena 78 y. o.year old female presents today   Chief Complaint   Patient presents with    Pain     Right buttock and radiates down into right leg, x 2 months, [pain comes and goes and a lot of pain    Ear Fullness     Right ear \"feels plugged\"    Hip Pain     Left hip, pressure    Medication Refill     Patient is complaining of pain in the lower back in the right sacroiliac area and radiates down the leg posteriorly up to the knee. No pain in the front. Patient denies any injury. She has pain in the right knee medially that has improved to some extent. Patient states she was ambulating well'  There is no numbness or weakness of the right leg. She has some pain in the right left hip also    Patient has fullness in the right ear feels plugged. Taking medications regularly. No side effects noted. Review of Systems    OBJECTIVE: /60   Pulse 56 Comment: irreg  Temp 97 °F (36.1 °C) (Temporal)   Resp 12   Wt 133 lb 12.8 oz (60.7 kg)   SpO2 99%   BMI 23.70 kg/m²     Wt Readings from Last 3 Encounters:   10/10/22 133 lb 12.8 oz (60.7 kg)   22 133 lb 12.8 oz (60.7 kg)   22 133 lb 9.6 oz (60.6 kg)      GENERAL: - Alert, oriented, pleasant, in no apparent distress. HEENT: - Conjunctiva pink, no scleral icterus. ENT clear. No cerumen noted. NECK: -Supple. No jugular venous distention noted. No masses felt,  CARDIOVASCULAR: - Normal S1 and S2    PULMONARY: - No respiratory distress. No wheezes or rales. ABDOMEN: - Soft and non-tender,no masses  ororganomegaly. EXTREMITIES: - No cyanosis, clubbing, or significant edema. SKIN: Skin is warm and dry. NEUROLOGICAL: - Cranial nerves II through XII are grossly intact.       Back : no tenderness of the lumbar spine  Tenderness of right SI area  Strength good  Reflexes normal.  Patient is ambulatory      IMPRESSION:    Encounter Diagnoses   Name Primary? Acute right-sided low back pain, unspecified whether sciatica present Yes    Acute right-sided low back pain with right-sided sciatica     Acute pain of right knee        ASSESSMENT/PLAN:    X ray of lumbar spine and x-ray of the left knee  Tylenol 500 mg qid  Refer to PT  Return to office after the physical therapy if the pain gets worse patient should call back  Patient to call back for the results of the x-rays. Will refer to spine surgeon or orthopedic surgeon depending on the results and her symptoms    Mediations reviewed with the patient. Continue current medications. Appropriate prescriptions are addressed. After visit summeryprovided. Follow up as directed sooner if needed. Questions answered and patient verbalizes understanding. Call for any problems, questions, or concerns. Allergies   Allergen Reactions    Amiodarone      Severe hair loss    Amoxicillin Other (See Comments)     Pt states she got C-diff. , so she doesn't like atb     Current Outpatient Medications   Medication Sig Dispense Refill    atorvastatin (LIPITOR) 10 MG tablet TAKE 1 TABLET EVERY DAY 90 tablet 1    levothyroxine (SYNTHROID) 50 MCG tablet TAKE 1 TABLET BY MOUTH DAILY 90 tablet 1    benazepril (LOTENSIN) 10 MG tablet TAKE 1 TABLET BY MOUTH EVERY DAY 90 tablet 1    Magnesium 125 MG CAPS Take 250 mg by mouth daily 90 capsule 1    dicyclomine (BENTYL) 10 MG capsule Take 1 capsule by mouth 4 times daily as needed (abdominal cramps) 120 capsule 1    docusate sodium (COLACE) 100 MG capsule Take 1 capsule by mouth daily as needed for Constipation 30 capsule 2    aspirin 81 MG tablet Take 81 mg by mouth daily      lactobacillus (CULTURELLE) capsule Take 1 capsule by mouth 2 times daily (with meals) 30 capsule 0    Coenzyme Q10 (CO Q 10) 100 MG CAPS Take by mouth daily       Cholecalciferol (VITAMIN D3) 2000 UNITS CAPS Take 1 capsule by mouth daily       Multiple Vitamins-Minerals (MULTIVITAMIN & MINERAL PO) Take 1 tablet by mouth daily      calcium carbonate (OSCAL) 500 MG TABS tablet Take 500 mg by mouth daily       No current facility-administered medications for this visit. Past Medical History:   Diagnosis Date    Acute deep vein thrombosis (DVT) of distal vein of left lower extremity ([address] 10/23/2016    DVT involving left peroneal, posterior and anterior tibial veins 10/16 Treated for at least 3 months and repeat venous doppler was negative and was discontinued     Axillary adenopathy 8/28/2014    US of axilla 6/14 showed benign lymph nodes    C. difficile diarrhea 11/18/2016    Treated few times and now is normal    CAD (coronary artery disease)     Chest discomfort 8/1/2017    Colitis 12/13    EGD,COLONOSCOPY, SBFT normal. Dr. Klever Griffin    Cyst of right ovary 9/6/2016    Seen Dr Sandro Duran and had several US per pt. Dizzy spells 6/5/2017    H/O 24 hour EKG monitoring 7/27/09    NSR, few PVCs and occasional couplet noted (total 1007 PVCs), no pauses noted. H/O colonoscopy 10/09, 12/13    f/u in 5 years    H/O echocardiogram 9/17/12    Normal LV size and function, mild mitral and tricuspid regurg., EF 60%. H/O mitral valve prolapse 2003    MR mild    H/O myocardial perfusion scan 12/16/14    Stress Cardiolite. Normal perfusion in the distribution of all coronaries, normal LV size and function, EF 70%. H/O screening mammography 8/11    Dr Reina Lopez    History of cardiac monitoring 06/05/2017    Abnormal-SR with complex ventricular arrhythmias and frequent PVCs. No symptoms during marked bradycardia or during the fastest rate of 143 with nonsustained three-beat runs of ventricular tachycardia. History of cardiac monitoring 08/23/2017    Abnormal event monitor findings suggestive of predominantly sinus bradycardia with isolated and frequent low-grade ventricular ectopy with symptoms reported.      Hx of Doppler ultrasound 10/17/2017    duplex venous doppler-minimal reflux noted in the left CFV    Hyperlipidemia Hypertension     Hypothyroidism     Liver dysfunction     liver bx 10/09,mild steaotosis    Osteoarthritis of cervical spine 6/7/2016    Ovarian cyst     Papanicolaou smear 09/14/2016    Dr. Afshan Daly    PVCs (premature ventricular contractions)     symptomatic frequent PVCs, sees Dr. Deven Tay    Ventricular tachycardia 8/1/2017    Exercise induced 8/1/17     Past Surgical History:   Procedure Laterality Date    APPENDECTOMY      BREAST BIOPSY      CATARACT REMOVAL Bilateral     COLONOSCOPY  12/3/13,2-28-17 2.28.17 no need for follow up due to pt's advanced age per Dr. Reina Arenas. DIAGNOSTIC CARDIAC CATH LAB PROCEDURE  08/03/2017    Normal coronaries, EF 70%. HYSTERECTOMY (CERVIX STATUS UNKNOWN)  1961    LIVER BIOPSY  10/09    steasosis    UPPER GASTROINTESTINAL ENDOSCOPY  11/02/2017    Dr. Reina Arenas     Social History     Tobacco Use    Smoking status: Never    Smokeless tobacco: Never   Substance Use Topics    Alcohol use:  Yes     Alcohol/week: 0.0 standard drinks     Comment:  1 beers per day or 2 glasses of \"bubbly\"/day       LAB REVIEW:  CBC:   Lab Results   Component Value Date/Time    WBC 8.5 03/16/2022 08:40 AM    HGB 13.7 03/16/2022 08:40 AM    HCT 41.7 03/16/2022 08:40 AM     03/16/2022 08:40 AM     Lipids:   Lab Results   Component Value Date    HDL 66 03/16/2022    LDLCALC 60 03/16/2022    LDLDIRECT 64 12/04/2020    TRIGLYCFAST 87 03/16/2022    CHOLFAST 143 03/16/2022     Renal:   Lab Results   Component Value Date/Time    BUN 22 03/16/2022 08:40 AM    CREATININE 1.0 03/16/2022 08:40 AM     03/16/2022 08:40 AM    K 4.8 03/16/2022 08:40 AM    ALT 22 03/16/2022 08:40 AM    AST 17 03/16/2022 08:40 AM    GLUCOSE 94 05/25/2021 09:28 AM    GLUF 105 03/16/2022 08:40 AM     PT/INR:   Lab Results   Component Value Date/Time    INR 0.91 10/17/2017 10:51 AM     A1C:   Lab Results   Component Value Date    LABA1C 5.5 11/13/2019           Jo Ann Quinones MD, 10/10/2022 , 8:15 AM

## 2022-10-11 ENCOUNTER — TELEPHONE (OUTPATIENT)
Dept: INTERNAL MEDICINE CLINIC | Age: 82
End: 2022-10-11

## 2022-10-19 ENCOUNTER — HOSPITAL ENCOUNTER (OUTPATIENT)
Dept: PHYSICAL THERAPY | Age: 82
Setting detail: THERAPIES SERIES
Discharge: HOME | End: 2022-10-19
Payer: MEDICARE

## 2022-10-19 PROCEDURE — 97162 PT EVAL MOD COMPLEX 30 MIN: CPT

## 2022-10-19 PROCEDURE — 97530 THERAPEUTIC ACTIVITIES: CPT

## 2022-10-19 PROCEDURE — 97110 THERAPEUTIC EXERCISES: CPT

## 2022-10-19 NOTE — PROGRESS NOTES
[facility] Outpatient Physical Therapy  [address] [place] [address]  Phone: [phone]  Fax: [phone]      PHYSICAL THERAPY INITIAL ASSESSMENT      Date: 10/19/2022  Patient Name: Katherine Bernal   : 1940  Referred by: Cynthia Macile MD  Reason for Referral: M54.50 acute R sided low back pain , unspecified whether sciatica present, M25.561 acute pain of R knee  PT Impression: M62.81 muscle weakness, R26.89 abnormality of gait  Insurance: 1) Medicare 2) [employer]  Restrictions/Precautions: fall risk    Subjective   Chart Reviewed: Yes   Patient assessed for rehabilitation services?: Yes   Family / Caregiver Present: No  Follows Commands: Within Functional Limits   Date of onset:  Pt reports about 2 months ago she twisted her R knee turning and it didn't hurt too bad at the time. Subjective: pt reports pain in medial knee and intermittent pain in R hip and down the back of her R LE  Current Situation: Pt reports her back and hip pain is worse than knee, but less constant   Observation: Pt arrived with no AD, slightly   Medication: updated in EMR    Pain Screening   Patient Currently in Pain:   Pain Assessment: 0-10   Pain Level: 3/10  in R hip and buttocks  1/10 in medial knee  Worst pain: 10/10 in R hip and buttocks  5/10  Best pain:   1/10   Sensation: none per pt report    Vision/Hearing   Vision: unimpaired. Pt has had cataract surgery. Hearing: Within functional limits     Home Living  Lives With: alone  Type of Home: house  Home Layout:  single story with laundry in basement  Toilet: handicapped  Bathroom: tub shower  pt reports she likes to get down in the bath and soak.    Work: retired  Hobbies: Pt reports she walks at [employer], and plays cards at the [employer],   Prior level of function:  Pt reports she did not have much trouble prior to this  Patient reports hardest things at home: 1) walking on different shani  2) standing to complete household tasks    Patient goals: to get rid of pain    Orientation: WNL    Objective    AROM  LE  Knee:    Right Left   Knee flexion      124  degrees       134 degrees   Knee extension       4 degrees hyper ext       1 degree hyper ext     Strength LE  Hip:   Right Left   Hip flexion         4+/5        5/5   Hip abd        5/5        5/5   Hip add        5/5 5/5     Knee:    Right Left   Knee flexion        4+/5        5/5   Knee extension        5/5 5/5     Ankle:   Right Left   Ankle DF         5/5 5/5      Bed Mobility: Independent    Transfers  Sit to Stand: mod I  Stand to sit: Mod I    Ambulation  Ambulation?: Yes  WB Status: FWB  Surface: level tile  Device: none  Assistance: Independent  Quality of Gait: Pt demonstrated steady gait with slight throwing out of R LE into knee ext  Distance: 300 feet around the clinic    Additional Tests: Oswestry: 27/50 = 54% disability     Assessment   Decreased functional mobility ; Decreased strength;Decreased endurance;Decreased high-level IADLs;Decreased ADL status; Decreased ROM; Assessment: Pt is a pleasant 79 yo female who would benefit from skilled PT to address decreased ROM, strength, balance, endurance, functional mobility, and increased pain. Prognosis: Good  Discharge Recommendations: Patient would benefit from additional therapy;Continue to assess pending progress,   Requires PT Follow Up: Yes  Activity Tolerance: Patient Tolerated treatment well.     Treatment Administered: See flowsheet  Patient Education: See flowsheet  Learning Style: Any, provide written HEP    Plan   Plan of care initiated  Frequency and duration of tx:    Barriers include: none  Treatment:  Therapeutic exercises including ROM, PREs, stretching, strengthening, and stability  Therapeutic activity  Gait training  Stair training  Neuro re-ed  Coordination training and body awareness  Positioning and postural awareness  Modalities including e-stim, ultrasound, heat, cold, and foam roll  Manual therapy including: STM, MFR, and TPR  Aquatic Therapy  Therapeutic taping  HEP and education    Goals  Short term goals to be deferred to long term goals. Long term goals to be achieved by November 20, 2022 :   Long term goal 1: Pt will demonstrate I with current HEP as prescribed in order to increase ROM and strength. Long term goal 2: Pt will demonstrate R LE strength 5/5 in order to improve strength. Long term goal 3: Pt will report worst pain in the last week no more than 4/10 in order to improve quality of life. Long term goal 4: Pt will demonstrate an Oswestry score of no  more than 40% disability in order to improve quality of life. Note: Goals, frequency, plan, and recommendations will be updated as needed. Goals and treatment plan discussed with family and mutually agreed upon. YES   Will discharge patient when therapy goals have been met or when therapy is no longer deemed necessary. This plan was reviewed with the patient/family and they were in agreement. Electronically signed by:  Grayson Wolfe PT,DPT [id number] Date: 10/19/2022, Time: 7:76 AM      I certify that the above patient is under my care and requires the above skilled services. These professional services are to be provided from an established plan, reviewed by me at least every 90 days. These services are related to the diagnosis stated above and are medically necessary.     Date last seen by physician:_________________________________________________    Physician Signature:____________________________________________Date:_______________

## 2022-10-19 NOTE — FLOWSHEET NOTE
Outpatient Physical Therapy  [place]           [] Phone: [phone]   Fax: [phone]  Cecilia mccoy           [x] Phone: [phone]   Fax: [phone]        Physical Therapy Daily Treatment Note  Date:  10/19/2022    Patient Name:  Mark Anthony Parkinson    :  1940  MRN: [id number]  Restrictions/Precautions: fall risk  Diagnosis:   Acute right-sided low back pain, unspecified whether sciatica present [M54.50]  Acute pain of right knee [M25.561]    Date of Injury/Surgery: about 2 months ago pt twisted R knee  Treatment Diagnosis:   M62.81 muscle weakness, R26.89 abnormality of gait  Insurance/Certification information:  1) Medicare 2) [facility] [address]   Referring Physician:  More Ramos MD     PCP: Gabriele Maciel MD  Plan of care signed (Y/N):  eval faxed  Outcome Measure: Oswestry:  = 54% disability   Visit# / total visits:   /10 then PN  Pain level: 3/10  in R hip and buttocks  1/10 in medial knee   Goals:     Patient goals:  to get rid of pain    Subjective:  See eval     Any changes in Ambulatory Summary Sheet? None    Objective:  See eval   COVID screening questions were asked and patient attested that there had been no contact or symptoms    Exercises: (No more than 4 columns)   Exercise/Equipment Date: 10/19/22 Date Date           WARM UP       NuStep              TABLE      Glute set 1x10 5\"     Bridge with ball btwn knees      TA contraction 1x10 5\"     TA contraction with march       DKTC w/peanut        Clamshells R LE      Side lying SLR R LE      SKTC R LE 3x30\" R LE     Fig 4 piriformis stretch  R LE 3x30\" R LE     Quad set      SAQ      SLR      STANDING      High knee marching      4 way hip                                         PROPRIOCEPTION                                    MODALITIES                    Other Therapeutic Activities/Education:  Pt educated on PT findings, plan, prognosis, and POC. Pt educated on HEP.      Home Exercise Program:  10/19 - , fig 4 piriformis stretch, glute set, TA contraction    Manual Treatments:  none    Modalities:  none    Communication with other providers:  eval faxed    Assessment:  (Response towards treatment session) (Pain Rating)  0-1/10 pt tolerated well and reported feeling some relief following the session.       Plan for Next Session: Continue per POC     Time In / Time Out:  9:40-10:40 am      Timed Code/Total Treatment Minutes:  60'/1 mod eval, 2 therapeutic exercise, 1 therapeutic activity     Next Progress Note due:  11/20/22    Plan of Care Interventions:  [x] Therapeutic Exercise  [] Modalities:  [x] Therapeutic Activity     [] Ultrasound  [] Estim  [x] Gait Training      [] Cervical Traction [] Lumbar Traction  [x] Neuromuscular Re-education    [] Cold/hotpack [] Iontophoresis   [x] Instruction in HEP      [] Vasopneumatic   [] Dry Needling    [] Manual Therapy               [] Aquatic Therapy            Electronically signed by:  Sean Dahl, PT,DPT [id number]  10/19/2022, 1:53 PM

## 2022-10-25 ENCOUNTER — HOSPITAL ENCOUNTER (OUTPATIENT)
Dept: PHYSICAL THERAPY | Age: 82
Setting detail: THERAPIES SERIES
Discharge: HOME | End: 2022-10-25
Payer: MEDICARE

## 2022-10-25 PROCEDURE — 97110 THERAPEUTIC EXERCISES: CPT

## 2022-10-25 PROCEDURE — 97530 THERAPEUTIC ACTIVITIES: CPT

## 2022-10-25 NOTE — FLOWSHEET NOTE
Outpatient Physical Therapy  [place]           [] Phone: [phone]   Fax: [phone]  Natalya Rhoda           [x] Phone: [phone]   Fax: [phone]        Physical Therapy Daily Treatment Note  Date:  10/25/2022    Patient Name:  Emanuel Rhodes    :  1940  MRN: [id number]  Restrictions/Precautions: fall risk  Diagnosis:   Acute right-sided low back pain, unspecified whether sciatica present [M54.50]  Acute pain of right knee [M25.561]    Date of Injury/Surgery: about 2 months ago pt twisted R knee  Treatment Diagnosis:   M62.81 muscle weakness, R26.89 abnormality of gait  Insurance/Certification information:  1) Medicare 2) [facility]  Referring Physician:  Osiris Bustamante MD     PCP: Neli Pereira MD  Plan of care signed (Y/N):  eval faxed  Outcome Measure: Oswestry:  = 54% disability   Visit# / total visits:   2/10 then PN  Pain level: 3/10  in R hip and buttocks  1/10 in medial knee   Goals:     Patient goals:  to get rid of pain    Subjective:  Continues to note increased pain in the R LE when standing to put on her make up or when brushing her teeth. Reports compliance with HEP. Any changes in Ambulatory Summary Sheet?   None    Objective:  Fair TA contraction noted with exercises  COVID screening questions were asked and patient attested that there had been no contact or symptoms    Exercises: (No more than 4 columns)   Exercise/Equipment Date: 10/19/22 Date  10/25/2022 Date           WARM UP       NuStep    10 min           TABLE      Glute set 1x10 5\" 10x5\"    Bridge with ball btwn knees  10x3\"    TA contraction 1x10 5\" 10x5\"    TA contraction with march  10x B     DKTC w/peanut    10x5\"    Clamshells R LE  10x    Side lying SLR R LE      SKTC R LE 3x30\" R LE 3x30\"    Fig 4 piriformis stretch  R LE 3x30\" R LE 3x30\" R LE    Quad set  10x5\"    SAQ  Med Roll  10x5\"    SLR  10x    STANDING      High knee marching      4 way hip                                         PROPRIOCEPTION MODALITIES                    Other Therapeutic Activities/Education:  Pt educated on PT findings, plan, prognosis, and POC. Pt educated on HEP. Home Exercise Program:  10/19 - [facility], fig 4 piriformis stretch, glute set, TA contraction    Manual Treatments:  none    Modalities:  none    Communication with other providers:  doris louis    Assessment:  (Response towards treatment session) (Pain Rating) Patient noted that her hip and back were feeling better after treatment. Progressed core and LE strengthening and ROM exercises this visit with verbal cues and education provided.   Instructed patient to try opening her cabinet and propping her foot inside when she is putting on her make up     Plan for Next Session: Continue per POC     Time In / Time Out:  0900/ 0945    Timed Code/Total Treatment Minutes:  39' (1 TA, 2 TE)    Next Progress Note due:  11/20/22    Plan of Care Interventions:  [x] Therapeutic Exercise  [] Modalities:  [x] Therapeutic Activity     [] Ultrasound  [] Estim  [x] Gait Training      [] Cervical Traction [] Lumbar Traction  [x] Neuromuscular Re-education    [] Cold/hotpack [] Iontophoresis   [x] Instruction in HEP      [] Vasopneumatic   [] Dry Needling    [] Manual Therapy               [] Aquatic Therapy            Electronically signed by:  Marianne Avila PTA   10/25/2022, 8:58 AM

## 2022-10-28 ENCOUNTER — HOSPITAL ENCOUNTER (OUTPATIENT)
Dept: PHYSICAL THERAPY | Age: 82
Setting detail: THERAPIES SERIES
Discharge: HOME | End: 2022-10-28
Payer: MEDICARE

## 2022-10-28 PROCEDURE — 97110 THERAPEUTIC EXERCISES: CPT

## 2022-10-28 PROCEDURE — 97530 THERAPEUTIC ACTIVITIES: CPT

## 2022-10-28 NOTE — FLOWSHEET NOTE
Outpatient Physical Therapy  [place]           [] Phone: [phone]   Fax: [phone]  Jr Arnold           [x] Phone: [phone]   Fax: [phone]        Physical Therapy Daily Treatment Note  Date:  10/28/2022    Patient Name:  Yolette Vega    :  1940  MRN: [id number]  Restrictions/Precautions: fall risk  Diagnosis:   Acute right-sided low back pain, unspecified whether sciatica present [M54.50]  Acute pain of right knee [M25.561]    Date of Injury/Surgery: about 2 months ago pt twisted R knee  Treatment Diagnosis:   M62.81 muscle weakness, R26.89 abnormality of gait  Insurance/Certification information:  1) Medicare 2) [address] [place] [address]  Referring Physician:  Nas Blackwood MD     PCP: Britany Merritt MD  Plan of care signed (Y/N):  eval faxed  Outcome Measure: Oswestry: 50 = 54% disability   Visit# / total visits:   3/10 then PN  Pain level: 8-9/10  in R hip and buttocks  3/10 in medial knee   Goals:     Patient goals:  to get rid of pain    Subjective:  Continues to note increased pain in the R LE when standing to put on her make up or when brushing her teeth. Reports compliance with HEP. Any changes in Ambulatory Summary Sheet?   None    Objective:  Continues to have pain with weight bearing activities on the R LE    COVID screening questions were asked and patient attested that there had been no contact or symptoms    Exercises: (No more than 4 columns)   Exercise/Equipment Date: 10/19/22 Date  10/25/2022 Date  10/28/2022           WARM UP       NuStep    10 min  Seat 8/Arms 9  Lev 3 x 10 min          TABLE      Glute set 1x10 5\" 10x5\" 10x5\"   Bridge with ball btwn knees  10x3\" 10x3\"   TA contraction 1x10 5\" 10x5\" 10x5\"   TA contraction with march  10x B 10x B    DKTC w/peanut    10x5\" 15x5\"   Clamshells R LE  10x 10x   Side lying SLR R LE   10x   SKTC R LE 3x30\" R LE 3x30\" 3x30\" R LE    Fig 4 piriformis stretch  R LE 3x30\" R LE 3x30\" R LE 3x30\" R   Quad set  10x5\" 10x5\"   SAQ  Med Roll  10x5\" Med Roll  10x5\"   SLR  10x 10x   STANDING      High knee marching      4 way hip   10x 3 directions                                      PROPRIOCEPTION                                    MODALITIES                    Other Therapeutic Activities/Education:  Pt educated on PT findings, plan, prognosis, and POC. Pt educated on HEP. Home Exercise Program:  10/19 - [facility], fig 4 piriformis stretch, glute set, TA contraction  10/28/2022: bridging, clamshells, sidelying hip abduction, quad sets, SLR    Manual Treatments:  none    Modalities:  none    Communication with other providers:  eval faxed    Assessment:  (Response towards treatment session) (Pain Rating) Noted increased pain in the right hip with the weight bearing exercises on that side. Progressed HEP this visit.        Plan for Next Session: Continue per POC     Time In / Time Out:  8036/6798    Timed Code/Total Treatment Minutes:  39' (1 TA, 2 TE)    Next Progress Note due:  11/20/22    Plan of Care Interventions:  [x] Therapeutic Exercise  [] Modalities:  [x] Therapeutic Activity     [] Ultrasound  [] Estim  [x] Gait Training      [] Cervical Traction [] Lumbar Traction  [x] Neuromuscular Re-education    [] Cold/hotpack [] Iontophoresis   [x] Instruction in HEP      [] Vasopneumatic   [] Dry Needling    [] Manual Therapy               [] Aquatic Therapy            Electronically signed by:  Keagan Jean-Baptiste PTA   10/28/2022, 9:40 AM

## 2022-11-01 ENCOUNTER — HOSPITAL ENCOUNTER (OUTPATIENT)
Dept: PHYSICAL THERAPY | Age: 82
Setting detail: THERAPIES SERIES
Discharge: HOME | End: 2022-11-01
Payer: MEDICARE

## 2022-11-01 PROCEDURE — 97110 THERAPEUTIC EXERCISES: CPT

## 2022-11-01 NOTE — FLOWSHEET NOTE
Outpatient Physical Therapy  [place]           [] Phone: [phone]   Fax: [phone]  [facility] Sow           [x] Phone: [phone]   Fax: [phone]        Physical Therapy Daily Treatment Note  Date:  2022    Patient Name:  Karol Pearson    :  1940  MRN: [id number]  Restrictions/Precautions: fall risk  Diagnosis:   Acute right-sided low back pain, unspecified whether sciatica present [M54.50]  Acute pain of right knee [M25.561]    Date of Injury/Surgery: about 2 months ago pt twisted R knee  Treatment Diagnosis:   M62.81 muscle weakness, R26.89 abnormality of gait  Insurance/Certification information:  1) Medicare 2) [facility]  Referring Physician:  Alfonso Garcia MD     PCP: Trung Morris MD  Plan of care signed (Y/N):  eval faxed  Outcome Measure: Oswestry:  = 54% disability   Visit# / total visits:   3/10 then PN  Pain level: 4-5/10  in R hip and buttocks  0/10 in medial knee   Goals:     Patient goals:  to get rid of pain    Subjective:  Pt reports that she had pain 10/10 yesterday after walking at the store. Any changes in Ambulatory Summary Sheet?   None    Objective:  Continues to have pain with weight bearing activities on the R LE    COVID screening questions were asked and patient attested that there had been no contact or symptoms    Exercises: (No more than 4 columns)   Exercise/Equipment Date: 22         WARM UP     NuStep   X10' S8/A9 Lvl 3       TABLE    Glute set 1x10 5\"   Bridge with ball btwn knees 1x12 5\"   TA contraction 1x10 5\"   TA contraction with march 1x12 B LE    DKTC w/peanut   1x15 5\"   Clamshells R LE 1x12    Side lying SLR R LE 1x10    SKTC R LE 3x30\" R :E   Fig 4 piriformis stretch  R LE 3x30\" R LE   Quad set 1x15 5\" B LE  Small roll under heels   SAQ 1# 1x15 R LE med roll   SLR 1# 1x10 R LE   STANDING    High knee marching 3 laps   3 way hip 1x15 B LE                            PROPRIOCEPTION                        MODALITIES              Other Therapeutic Activities/Education:  Pt educated on PT findings, plan, prognosis, and POC. Pt educated on HEP.      Home Exercise Program:  10/19 - SKTC, fig 4 piriformis stretch, glute set, TA contraction  10/28/2022: bridging, clamshells, sidelying hip abduction, quad sets, SLR    Manual Treatments:  none    Modalities:  none    Communication with other providers:  eval faxed    Assessment:  (Response towards treatment session) (Pain Rating) 0/10     Plan for Next Session: Continue per POC     Time In / Time Out:  8:45 -9:40 am    Timed Code/Total Treatment Minutes:  54' 4 therapeutic exercise    Next Progress Note due:  11/20/22    Plan of Care Interventions:  [x] Therapeutic Exercise  [] Modalities:  [x] Therapeutic Activity     [] Ultrasound  [] Estim  [x] Gait Training      [] Cervical Traction [] Lumbar Traction  [x] Neuromuscular Re-education    [] Cold/hotpack [] Iontophoresis   [x] Instruction in HEP      [] Vasopneumatic   [] Dry Needling    [] Manual Therapy               [] Aquatic Therapy            Electronically signed by:  Sean Dahl, PT, DPT [id number]   11/1/2022, 8:45 AM

## 2022-11-04 ENCOUNTER — HOSPITAL ENCOUNTER (OUTPATIENT)
Dept: PHYSICAL THERAPY | Age: 82
Setting detail: THERAPIES SERIES
Discharge: HOME | End: 2022-11-04
Payer: MEDICARE

## 2022-11-04 PROCEDURE — 97530 THERAPEUTIC ACTIVITIES: CPT

## 2022-11-04 PROCEDURE — 97110 THERAPEUTIC EXERCISES: CPT

## 2022-11-04 NOTE — FLOWSHEET NOTE
Outpatient Physical Therapy  [place]           [] Phone: [phone]   Fax: [phone]  [place]           [x] Phone: [phone]   Fax: [phone]        Physical Therapy Daily Treatment Note  Date:  2022    Patient Name:  Caitlin Molina    :  1940  MRN: [id number]  Restrictions/Precautions: fall risk  Diagnosis:   Acute right-sided low back pain, unspecified whether sciatica present [M54.50]  Acute pain of right knee [M25.561]    Date of Injury/Surgery: about 2 months ago pt twisted R knee  Treatment Diagnosis:   M62.81 muscle weakness, R26.89 abnormality of gait  Insurance/Certification information:  1) Medicare 2) [facility]  Referring Physician:  Zonia Traylor MD     PCP: Valentino Grief, MD  Plan of care signed (Y/N):  eval faxed  Outcome Measure: Oswestry:  = 54% disability   Visit# / total visits:  5/10 then PN  Pain level: 10  in R hip and buttocks      Goals:     Patient goals:  to get rid of pain    Subjective:  Pt reports that she's been having some numbness of and on in the lateral and posterior right leg. Any changes in Ambulatory Summary Sheet?   None    Objective:   increased pain at end of session    COVID screening questions were asked and patient attested that there had been no contact or symptoms    Exercises: (No more than 4 columns)   Exercise/Equipment Date: 22          WARM UP      NuStep   X10' S8/A9 Lvl 3 S8/A9 Lvl 3  10'        TABLE     Glute set 1x10 5\" 10x5\"   Bridge with ball btwn knees 1x12 5\" 12x5\"   TA contraction 1x10 5\" 10x5\"   TA contraction with march 1x12 B LE 12x B    DKTC w/peanut   1x15 5\" 15x5\"   Clamshells R LE 1x12  12x   Side lying SLR R LE 1x10  10x   SKTC R LE 3x30\" R :E 3x30\" R   Fig 4 piriformis stretch  R LE 3x30\" R LE 3x30\" R   Quad set 1x15 5\" B LE  Small roll under heels 1x15 5\" B LE  Small roll under heels   SAQ 1# 1x15 R LE med roll 2# 15x B med roll   SLR 1# 1x10 R LE 2# 10x B   STANDING     High knee marching 3 laps 3 laps   3 way hip 1x15 B LE 15x B                                 PROPRIOCEPTION                              MODALITIES                 Other Therapeutic Activities/Education:  Pt educated on PT findings, plan, prognosis, and POC. Pt educated on HEP.      Home Exercise Program:  10/19 - SK, fig 4 piriformis stretch, glute set, TA contraction  10/28/2022: bridging, clamshells, sidelying hip abduction, quad sets, SLR    Manual Treatments:  none    Modalities:  none    Communication with other providers:  eval faxed    Assessment:  (Response towards treatment session) (Pain Rating) 5-6/10 no numbness   while on table numbness noted by pt     Plan for Next Session: Continue per POC     Time In / Time Out:   3156-0514    Timed Code/Total Treatment Minutes:  50  15ta 35te     Next Progress Note due:  11/20/22    Plan of Care Interventions:  [x] Therapeutic Exercise  [] Modalities:  [x] Therapeutic Activity     [] Ultrasound  [] Estim  [x] Gait Training      [] Cervical Traction [] Lumbar Traction  [x] Neuromuscular Re-education    [] Cold/hotpack [] Iontophoresis   [x] Instruction in HEP      [] Vasopneumatic   [] Dry Needling    [] Manual Therapy               [] Aquatic Therapy            Electronically signed by:  Huan Pereira PTA   11/4/2022, 8:46 AM

## 2022-11-08 ENCOUNTER — HOSPITAL ENCOUNTER (OUTPATIENT)
Dept: PHYSICAL THERAPY | Age: 82
Setting detail: THERAPIES SERIES
Discharge: HOME | End: 2022-11-08
Payer: MEDICARE

## 2022-11-08 PROCEDURE — 97530 THERAPEUTIC ACTIVITIES: CPT

## 2022-11-08 PROCEDURE — 97110 THERAPEUTIC EXERCISES: CPT

## 2022-11-08 NOTE — FLOWSHEET NOTE
Outpatient Physical Therapy  [place]           [] Phone: [phone]   Fax: [phone]  [facility] [place]           [x] Phone: [phone]   Fax: [phone]        Physical Therapy Daily Treatment Note  Date:  2022    Patient Name:  Jesús Butler    :  1940  MRN: [id number]  Restrictions/Precautions: fall risk  Diagnosis:   Acute right-sided low back pain, unspecified whether sciatica present [M54.50]  Acute pain of right knee [M25.561]    Date of Injury/Surgery: about 2 months ago pt twisted R knee  Treatment Diagnosis:   M62.81 muscle weakness, R26.89 abnormality of gait  Insurance/Certification information:  1) Medicare 2) Count includes the [facility]  Referring Physician:  Vicki Carrasquillo MD     PCP: Cristiano Avina MD  Plan of care signed (Y/N):  valentineal faxed  Outcome Measure: Oswestry:  = 54% disability   Visit# / total visits:  6/10 then PN  Pain level: 4-5/10  in R hip and buttocks      Goals:     Patient goals:  to get rid of pain    Subjective:  Pt reports that she's to see her phys on Thursday to talk about the numbness and the pain in her knee. Any changes in Ambulatory Summary Sheet?   None    Objective:    numbness in foot persists from time to time    COVID screening questions were asked and patient attested that there had been no contact or symptoms    Exercises: (No more than 4 columns)   Exercise/Equipment Date: 22           WARM UP       NuStep   X10' S8/A9 Lvl 3 S8/A9 Lvl 3  10' S8/A9 Lvl 3  10'         TABLE      Glute set 1x10 5\" 10x5\" 10x5\"   Bridge with ball btwn knees 1x12 5\" 12x5\" 12x5\"   TA contraction 1x10 5\" 10x5\" 10x5\"   TA contraction with march 1x12 B LE 12x B 12x B    DKTC w/peanut   1x15 5\" 15x5\" 15x5\"   Clamshells R LE 1x12  12x 15x   Side lying SLR R LE 1x10  10x 15x   SKTC R LE 3x30\" R :E 3x30\" R 3x30\" B   Fig 4 piriformis stretch  R LE 3x30\" R LE 3x30\" R 3x30\" B   Quad set 1x15 5\" B LE  Small roll under heels 1x15 5\" B LE  Small roll under heels x15 5\" B LE  Small roll under heels   SAQ 1# 1x15 R LE med roll 2# 15x B med roll 1# 15x B med roll   SLR 1# 1x10 R LE 2# 10x B 1# 15x B   STANDING      High knee marching 3 laps 3 laps 3 laps   3 way hip 1x15 B LE 15x B 15x B                                      PROPRIOCEPTION                                    MODALITIES                    Other Therapeutic Activities/Education:  Pt educated on PT findings, plan, prognosis, and POC. Pt educated on HEP.      Home Exercise Program:  10/19 - SK, fig 4 piriformis stretch, glute set, TA contraction  10/28/2022: bridging, clamshells, sidelying hip abduction, quad sets, SLR    Manual Treatments:  none    Modalities:  none    Communication with other providers:  eval faxed    Assessment:  (Response towards treatment session) (Pain Rating) 3/10 no numbness   no numbness noted by pt during therapy today     Plan for Next Session: Continue per POC     Time In / Time Out:   7135-9901    Timed Code/Total Treatment Minutes:  51 15ta  36te    Next Progress Note due:  11/20/22    Plan of Care Interventions:  [x] Therapeutic Exercise  [] Modalities:  [x] Therapeutic Activity     [] Ultrasound  [] Estim  [x] Gait Training      [] Cervical Traction [] Lumbar Traction  [x] Neuromuscular Re-education    [] Cold/hotpack [] Iontophoresis   [x] Instruction in HEP      [] Vasopneumatic   [] Dry Needling    [] Manual Therapy               [] Aquatic Therapy            Electronically signed by:  Pham Delgado PTA   11/8/2022, 8:44 AM

## 2022-11-10 ENCOUNTER — OFFICE VISIT (OUTPATIENT)
Dept: INTERNAL MEDICINE CLINIC | Age: 82
End: 2022-11-10
Payer: MEDICARE

## 2022-11-10 VITALS
RESPIRATION RATE: 16 BRPM | DIASTOLIC BLOOD PRESSURE: 68 MMHG | WEIGHT: 137 LBS | HEART RATE: 72 BPM | TEMPERATURE: 97 F | OXYGEN SATURATION: 98 % | BODY MASS INDEX: 24.27 KG/M2 | SYSTOLIC BLOOD PRESSURE: 118 MMHG

## 2022-11-10 DIAGNOSIS — M54.41 ACUTE RIGHT-SIDED LOW BACK PAIN WITH RIGHT-SIDED SCIATICA: Primary | ICD-10-CM

## 2022-11-10 DIAGNOSIS — I49.3 PVCS (PREMATURE VENTRICULAR CONTRACTIONS): ICD-10-CM

## 2022-11-10 DIAGNOSIS — E78.2 MIXED HYPERLIPIDEMIA: ICD-10-CM

## 2022-11-10 DIAGNOSIS — M25.561 RIGHT KNEE PAIN, UNSPECIFIED CHRONICITY: ICD-10-CM

## 2022-11-10 DIAGNOSIS — I10 ESSENTIAL HYPERTENSION: ICD-10-CM

## 2022-11-10 DIAGNOSIS — K52.9 COLITIS: ICD-10-CM

## 2022-11-10 DIAGNOSIS — M81.0 AGE-RELATED OSTEOPOROSIS WITHOUT CURRENT PATHOLOGICAL FRACTURE: ICD-10-CM

## 2022-11-10 DIAGNOSIS — E03.9 ACQUIRED HYPOTHYROIDISM: ICD-10-CM

## 2022-11-10 DIAGNOSIS — I47.20 VENTRICULAR TACHYCARDIA: ICD-10-CM

## 2022-11-10 PROCEDURE — 99214 OFFICE O/P EST MOD 30 MIN: CPT | Performed by: INTERNAL MEDICINE

## 2022-11-10 PROCEDURE — 1036F TOBACCO NON-USER: CPT | Performed by: INTERNAL MEDICINE

## 2022-11-10 PROCEDURE — 3074F SYST BP LT 130 MM HG: CPT | Performed by: INTERNAL MEDICINE

## 2022-11-10 PROCEDURE — 1123F ACP DISCUSS/DSCN MKR DOCD: CPT | Performed by: INTERNAL MEDICINE

## 2022-11-10 PROCEDURE — 3078F DIAST BP <80 MM HG: CPT | Performed by: INTERNAL MEDICINE

## 2022-11-10 PROCEDURE — G8427 DOCREV CUR MEDS BY ELIG CLIN: HCPCS | Performed by: INTERNAL MEDICINE

## 2022-11-10 PROCEDURE — G8399 PT W/DXA RESULTS DOCUMENT: HCPCS | Performed by: INTERNAL MEDICINE

## 2022-11-10 PROCEDURE — G8484 FLU IMMUNIZE NO ADMIN: HCPCS | Performed by: INTERNAL MEDICINE

## 2022-11-10 PROCEDURE — G8420 CALC BMI NORM PARAMETERS: HCPCS | Performed by: INTERNAL MEDICINE

## 2022-11-10 PROCEDURE — 1090F PRES/ABSN URINE INCON ASSESS: CPT | Performed by: INTERNAL MEDICINE

## 2022-11-10 RX ORDER — DOCUSATE SODIUM 100 MG/1
100 CAPSULE, LIQUID FILLED ORAL DAILY PRN
Qty: 30 CAPSULE | Refills: 2 | Status: SHIPPED | OUTPATIENT
Start: 2022-11-10

## 2022-11-10 NOTE — PROGRESS NOTES
Sharda Padilla  Patient's  is 1940  Seen in office on 11/10/2022      SUBJECTIVE:  [facility] LEONARD keena 78 y. o.year old female presents today   Chief Complaint   Patient presents with    Follow-up    Joint Swelling     Right knee, doesn't feel right    Leg Pain     Right leg, some numbness starting from knee down for x 2 months and goes into right foot    Medication Refill     Pt is here for routine f/u and f/u on back also  Patient had x-rays done  X-ray of the lumbar spine showed mild facet arthropathy at L4-5 and L5-S1 no acute findings noted. Patient is doing her physical therapy. Pt state stares she had numbness below the knee laterally down to right foot  This has been going on for the last 2 months. She also has some right knee swelling. No falls  Patient history of cardiac arrhythmias. No dizziness. No syncope or near syncope. Patient has hypertension. Taking medications No headaches, no chest pain, no palpitations and no dizziness. Patient has hyperlipidemia. Taking medications. No abdominal pain, no nausea or vomiting. No myalgias. Taking medications regularly. No side effects noted. Review of Systems  Review of system normal except as in HPI  No syncope or near syncope  No palpitations or dizziness  OBJECTIVE: /68   Pulse 72   Temp 97 °F (36.1 °C) (Temporal)   Resp 16   Wt 137 lb (62.1 kg)   SpO2 98%   BMI 24.27 kg/m²     Wt Readings from Last 3 Encounters:   11/10/22 137 lb (62.1 kg)   10/10/22 133 lb 12.8 oz (60.7 kg)   22 133 lb 12.8 oz (60.7 kg)      GENERAL: - Alert, oriented, pleasant, in no apparent distress. HEENT: - Conjunctiva pink, no scleral icterus. ENT clear. NECK: -Supple. No jugular venous distention noted. No masses felt,  CARDIOVASCULAR: - Normal S1 and S2    PULMONARY: - No respiratory distress. No wheezes or rales. ABDOMEN: - Soft and non-tender,no masses  ororganomegaly. EXTREMITIES: - No cyanosis, clubbing, or significant edema.   SKIN: Skin is warm and dry. NEUROLOGICAL: - Cranial nerves II through XII are grossly intact. Right knee is mild tenderness. No calf tenderness. IMPRESSION:    Encounter Diagnoses   Name Primary? Acute right-sided low back pain with right-sided sciatica Yes    Age-related osteoporosis without current pathological fracture     Essential hypertension     Mixed hyperlipidemia     PVCs (premature ventricular contractions)     Acquired hypothyroidism     Colitis     Ventricular tachycardia     Right knee pain, unspecified chronicity        ASSESSMENT/PLAN:  1. Acute right-sided low back pain with right-sided sciatica  Take Tylenol as needed  Will get x-ray of the knee    2. Age-related osteoporosis without current pathological fracture  Overview:  Dexa scan : 4/2022 : osteopenia right hip and osteoprorosis left hip neck  Pt is using calcium and vitamin D3 3000 units a day   3. Essential hypertension  Overview:  Patient has HTN for > 10 years  On benazepril 10 mg daily   4. Mixed hyperlipidemia  Overview:  Patient on lipitor 10 mg daily  for hyperlipidemia  Lipid profile is good  5. PVCs (premature ventricular contractions)  Overview:  asymptomatic frequent PVCs as per 1/2016 event monitor  Pt was on amiodarone therapy but she stopped it in 5/15. Dr. Kallie Trujillo knows that. VT and PVCs on stress test:   Exercise induced 8/1/17  Cath normal coronories  Referred to Dr Armand Lemus. Recommended EP study. Pt declined. Pt has seen [facility] for 2nd opinion and Dr Rashaad Tobin did not recommend EP study  Has appointment with Dr Armand Lemus in 9/2021   6. Acquired hypothyroidism  Overview:  Pt is taking synthroid 50 mcg daily  7. Colitis  Overview:  Dr. Diana Herrera saw patient . Had colonosocpy 11/13 and SBFT   Pt takes bentyl prn. Has not used it for some time. 8. Ventricular tachycardia  Overview:  Exercise induced 8/1/17Cath normal coronories  Referred to Dr Armand Lemus.  Recommended EP study  He is planning to start her on some medication in hospital.   She will decide : but patient decide against it     9. Right knee pain, unspecified chronicity  -     XR KNEE RIGHT (3 VIEWS); Future    Orders Placed This Encounter   Procedures    XR KNEE RIGHT (3 VIEWS)   Return to office in a month    Mediations reviewed with the patient. Continue current medications. Appropriate prescriptions are addressed. After visit summeryprovided. Follow up as directed sooner if needed. Questions answered and patient verbalizes understanding. Call for any problems, questions, or concerns. Allergies   Allergen Reactions    Amiodarone      Severe hair loss    Amoxicillin Other (See Comments)     Pt states she got C-diff. , so she doesn't like atb     Current Outpatient Medications   Medication Sig Dispense Refill    Magnesium 125 MG CAPS Take 250 mg by mouth daily 90 capsule 1    atorvastatin (LIPITOR) 10 MG tablet TAKE 1 TABLET EVERY DAY 90 tablet 1    levothyroxine (SYNTHROID) 50 MCG tablet TAKE 1 TABLET BY MOUTH DAILY 90 tablet 1    benazepril (LOTENSIN) 10 MG tablet TAKE 1 TABLET BY MOUTH EVERY DAY 90 tablet 1    docusate sodium (COLACE) 100 MG capsule Take 1 capsule by mouth daily as needed for Constipation 30 capsule 2    aspirin 81 MG tablet Take 81 mg by mouth daily      lactobacillus (CULTURELLE) capsule Take 1 capsule by mouth 2 times daily (with meals) 30 capsule 0    Coenzyme Q10 (CO Q 10) 100 MG CAPS Take by mouth daily       Cholecalciferol (VITAMIN D3) 2000 UNITS CAPS Take 1 capsule by mouth daily       Multiple Vitamins-Minerals (MULTIVITAMIN & MINERAL PO) Take 1 tablet by mouth daily      calcium carbonate (OSCAL) 500 MG TABS tablet Take 500 mg by mouth daily      dicyclomine (BENTYL) 10 MG capsule Take 1 capsule by mouth 4 times daily as needed (abdominal cramps) 120 capsule 1     No current facility-administered medications for this visit.      Past Medical History:   Diagnosis Date    Acute deep vein thrombosis (DVT) of distal vein of left lower extremity ([address] 10/23/2016    DVT involving left peroneal, posterior and anterior tibial veins 10/16 Treated for at least 3 months and repeat venous doppler was negative and was discontinued     Axillary adenopathy 8/28/2014    US of axilla 6/14 showed benign lymph nodes    C. difficile diarrhea 11/18/2016    Treated few times and now is normal    CAD (coronary artery disease)     Chest discomfort 8/1/2017    Colitis 12/13    EGD,COLONOSCOPY, SBFT normal. Dr. Lisa Morillo    Cyst of right ovary 9/6/2016    Seen Dr Colin Vasquez and had several US per pt. Dizzy spells 6/5/2017    H/O 24 hour EKG monitoring 7/27/09    NSR, few PVCs and occasional couplet noted (total 1007 PVCs), no pauses noted. H/O colonoscopy 10/09, 12/13    f/u in 5 years    H/O echocardiogram 9/17/12    Normal LV size and function, mild mitral and tricuspid regurg., EF 60%. H/O mitral valve prolapse 2003    MR mild    H/O myocardial perfusion scan 12/16/14    Stress Cardiolite. Normal perfusion in the distribution of all coronaries, normal LV size and function, EF 70%. H/O screening mammography 8/11    Dr Nic Degroot    History of cardiac monitoring 06/05/2017    Abnormal-SR with complex ventricular arrhythmias and frequent PVCs. No symptoms during marked bradycardia or during the fastest rate of 143 with nonsustained three-beat runs of ventricular tachycardia. History of cardiac monitoring 08/23/2017    Abnormal event monitor findings suggestive of predominantly sinus bradycardia with isolated and frequent low-grade ventricular ectopy with symptoms reported.      Hx of Doppler ultrasound 10/17/2017    duplex venous doppler-minimal reflux noted in the left CFV    Hyperlipidemia     Hypertension     Hypothyroidism     Liver dysfunction     liver bx 10/09,mild steaotosis    Osteoarthritis of cervical spine 6/7/2016    Ovarian cyst     Papanicolaou smear 09/14/2016    Dr. Nic Degroot    PVCs (premature ventricular contractions)     symptomatic frequent PVCs, sees Dr. Kaden Galo Ventricular tachycardia 8/1/2017    Exercise induced 8/1/17     Past Surgical History:   Procedure Laterality Date    APPENDECTOMY      BREAST BIOPSY      CATARACT REMOVAL Bilateral     COLONOSCOPY  12/3/13,2-28-17    2.28.17 no need for follow up due to pt's advanced age per Dr. Ulloa Doctor. DIAGNOSTIC CARDIAC CATH LAB PROCEDURE  08/03/2017    Normal coronaries, EF 70%. HYSTERECTOMY (CERVIX STATUS UNKNOWN)  1961    LIVER BIOPSY  10/09    steasosis    UPPER GASTROINTESTINAL ENDOSCOPY  11/02/2017    Dr. Ulloa Doctor     Social History     Tobacco Use    Smoking status: Never    Smokeless tobacco: Never   Substance Use Topics    Alcohol use:  Yes     Alcohol/week: 0.0 standard drinks     Comment:  1 beers per day or 2 glasses of \"bubbly\"/day       LAB REVIEW:  CBC:   Lab Results   Component Value Date/Time    WBC 8.5 03/16/2022 08:40 AM    HGB 13.7 03/16/2022 08:40 AM    HCT 41.7 03/16/2022 08:40 AM     03/16/2022 08:40 AM     Lipids:   Lab Results   Component Value Date    HDL 66 03/16/2022    LDLCALC 60 03/16/2022    LDLDIRECT 64 12/04/2020    TRIGLYCFAST 87 03/16/2022    CHOLFAST 143 03/16/2022     Renal:   Lab Results   Component Value Date/Time    BUN 22 03/16/2022 08:40 AM    CREATININE 1.0 03/16/2022 08:40 AM     03/16/2022 08:40 AM    K 4.8 03/16/2022 08:40 AM    ALT 22 03/16/2022 08:40 AM    AST 17 03/16/2022 08:40 AM    GLUCOSE 94 05/25/2021 09:28 AM    GLUF 105 03/16/2022 08:40 AM     PT/INR:   Lab Results   Component Value Date/Time    INR 0.91 10/17/2017 10:51 AM     A1C:   Lab Results   Component Value Date    LABA1C 5.5 11/13/2019           Perlita Nam MD, 11/10/2022 , 10:20 AM

## 2022-11-11 ENCOUNTER — HOSPITAL ENCOUNTER (OUTPATIENT)
Dept: PHYSICAL THERAPY | Age: 82
Setting detail: THERAPIES SERIES
Discharge: HOME | End: 2022-11-11
Payer: MEDICARE

## 2022-11-11 PROCEDURE — 97112 NEUROMUSCULAR REEDUCATION: CPT

## 2022-11-11 PROCEDURE — 97110 THERAPEUTIC EXERCISES: CPT

## 2022-11-11 PROCEDURE — 97530 THERAPEUTIC ACTIVITIES: CPT

## 2022-11-11 NOTE — FLOWSHEET NOTE
Outpatient Physical Therapy  [place]           [] Phone: [phone]   Fax: [phone]  Cecilia mccoy           [x] Phone: [phone]   Fax: [phone]        Physical Therapy Daily Treatment Note  Date:  2022    Patient Name:  Yolette Vega    :  1940  MRN: [id number]  Restrictions/Precautions: fall risk  Diagnosis:   Acute right-sided low back pain, unspecified whether sciatica present [M54.50]  Acute pain of right knee [M25.561]    Date of Injury/Surgery: about 2 months ago pt twisted R knee  Treatment Diagnosis:   M62.81 muscle weakness, R26.89 abnormality of gait  Insurance/Certification information:  1) Medicare 2) [address] [place] [address]  Referring Physician:  Nas Blackwood MD     PCP: Britany Merritt MD  Plan of care signed (Y/N):  eval faxed  Outcome Measure: Oswestry:  = 54% disability   Visit# / total visits:  7/10 then PN  Pain level: 3/10  in R hip and buttocks      Goals:     Patient goals:  to get rid of pain    Subjective:  Pt reports that she's to see her phys on Thursday to talk about the numbness and the pain in her knee. Any changes in Ambulatory Summary Sheet?   None    Objective:    numbness in foot persists from time to time    COVID screening questions were asked and patient attested that there had been no contact or symptoms    Exercises: (No more than 4 columns)   Exercise/Equipment Date: 22            WARM UP        NuStep   X10' S8/A9 Lvl 3 S8/A9 Lvl 3  10' S8/A9 Lvl 3  10' S8/A9 Lvl 3  10'          TABLE       Glute set 1x10 5\" 10x5\" 10x5\" 10x5\"   Bridge with ball btwn knees 1x12 5\" 12x5\" 12x5\" 15x5\"   TA contraction 1x10 5\" 10x5\" 10x5\" 10x5\"   TA contraction with march 1x12 B LE 12x B 12x B 15x B    DKTC w/peanut   1x15 5\" 15x5\" 15x5\" 15x5\"   Clamshells R LE 1x12  12x 15x 15x B   Side lying SLR R LE 1x10  10x 15x 1# 15x B   SKTC R LE 3x30\" R :E 3x30\" R 3x30\" B 3x30\" B   Fig 4 piriformis stretch  R LE 3x30\" R LE 3x30\" R 3x30\" B 3x30\" B   Quad set 1x15 5\" B LE  Small roll under heels 1x15 5\" B LE  Small roll under heels x15 5\" B LE  Small roll under heels x15 5\" B LE  Small roll under heels   SAQ 1# 1x15 R LE med roll 2# 15x B med roll 1# 15x B med roll 1# small roll 15x B   SLR 1# 1x10 R LE 2# 10x B 1# 15x B 1# 15x B   STANDING       High knee marching 3 laps 3 laps 3 laps 3 laps   3 way hip 1x15 B LE 15x B 15x B YTB 15x B                                           PROPRIOCEPTION                                          MODALITIES                       Other Therapeutic Activities/Education:  Pt educated on PT findings, plan, prognosis, and POC. Pt educated on HEP.      [facility]  [facility] piriformis stretch, glute set, TA contraction  10/28/2022: bridging, clamshells, sidelying hip abduction, quad sets, SLR    Manual Treatments:  none    Modalities:  none    Communication with other providers:    Assessment:  (Response towards treatment session) (Pain Rating) 3/10 no numbness   discomfort noted w/glut sets after adding TB to 3-way     Plan for Next Session: Continue per POC     Time In / Time Out:   3549-0940    Timed Code/Total Treatment Minutes:  61   1te 1ta 1nr     Next Progress Note due:  11/20/22    Plan of Care Interventions:  [x] Therapeutic Exercise  [] Modalities:  [x] Therapeutic Activity     [] Ultrasound  [] Estim  [x] Gait Training      [] Cervical Traction [] Lumbar Traction  [x] Neuromuscular Re-education    [] Cold/hotpack [] Iontophoresis   [x] Instruction in HEP      [] Vasopneumatic   [] Dry Needling    [] Manual Therapy               [] Aquatic Therapy            Electronically signed by:  Cheryl Beard PTA   11/11/2022, 8:44 AM

## 2022-11-15 ENCOUNTER — HOSPITAL ENCOUNTER (OUTPATIENT)
Dept: GENERAL RADIOLOGY | Age: 82
Discharge: HOME | End: 2022-11-15
Payer: MEDICARE

## 2022-11-15 ENCOUNTER — HOSPITAL ENCOUNTER (OUTPATIENT)
Dept: PHYSICAL THERAPY | Age: 82
Setting detail: THERAPIES SERIES
Discharge: HOME | End: 2022-11-15
Payer: MEDICARE

## 2022-11-15 ENCOUNTER — HOSPITAL ENCOUNTER (OUTPATIENT)
Age: 82
Discharge: HOME | End: 2022-11-15
Payer: MEDICARE

## 2022-11-15 DIAGNOSIS — M25.561 RIGHT KNEE PAIN, UNSPECIFIED CHRONICITY: ICD-10-CM

## 2022-11-15 PROCEDURE — 97110 THERAPEUTIC EXERCISES: CPT

## 2022-11-15 PROCEDURE — 73562 X-RAY EXAM OF KNEE 3: CPT

## 2022-11-15 PROCEDURE — 97140 MANUAL THERAPY 1/> REGIONS: CPT

## 2022-11-15 NOTE — FLOWSHEET NOTE
Outpatient Physical Therapy  [place]           [] Phone: [phone]   Fax: [phone]  [facility]ecilia mccoy           [x] Phone: [phone]   Fax: [phone]        Physical Therapy Daily Treatment Note  Date:  11/15/2022    Patient Name:  Nolan Grey    :  1940  MRN: [id number]  Restrictions/Precautions: fall risk  Diagnosis:   Acute right-sided low back pain, unspecified whether sciatica present [M54.50]  Acute pain of right knee [M25.561]    Date of Injury/Surgery: about 2 months ago pt twisted R knee  Treatment Diagnosis:   M62.81 muscle weakness, R26.89 abnormality of gait  Insurance/Certification information:  1) Medicare 2) [address] [place] [address]  Referring Physician:  Chacho Jordan MD     PCP: Salvatore Hilario MD  Plan of care signed (Y/N):  eval faxed  Outcome Measure: Oswestry:  = 54% disability   Visit# / total visits:  8/10 then PN  Pain level: 4/10  in R hip and buttocks      Goals:     Patient goals:  to get rid of pain    Subjective:  Pt reports she saw her phys and he ordered an x-ray which she had this morning, and he is looking into an MRI of the knee as well. She reports over the weekend her pain went up to a 9-10/10 and just had to sit down but then reports at times it doesn't bother her. Any changes in Ambulatory Summary Sheet?   None    Objective:   decreased pain noted    COVID screening questions were asked and patient attested that there had been no contact or symptoms    Exercises: (No more than 4 columns)   Exercise/Equipment 11/8/22 11/11/22 11/15/22           WARM UP       NuStep   S8/A9 Lvl 3  10' S8/A9 Lvl 3  10' S8/A9 Lvl 3  10'         TABLE      Glute set 10x5\" 10x5\" 10x5\"   Bridge with ball btwn knees 12x5\" 15x5\" 15x   TA contraction 10x5\" 10x5\" 10x5\"   TA contraction with march 12x B 15x B 15x    DKTC w/peanut   15x5\" 15x5\" 15x   Clamshells R LE 15x 15x B ----   Side lying SLR R LE 15x 1# 15x B -----   SKTC R LE 3x30\" B 3x30\" B 2x30\" B   Fig 4 piriformis stretch  R LE 3x30\" B 3x30\" B 2x30\" B   Quad set x15 5\" B LE  Small roll under heels x15 5\" B LE  Small roll under heels ------   SAQ 1# 15x B med roll 1# small roll 15x B ------   SLR 1# 15x B 1# 15x B ------   STANDING      High knee marching 3 laps 3 laps 3 laps   3 way hip 15x B YTB 15x B YTB 15x B                                      PROPRIOCEPTION                                    MODALITIES                    Other Therapeutic Activities/Education:  Pt educated on PT findings, plan, prognosis, and POC. Pt educated on HEP.      Home Exercise Program:  10/19 - SKTC, fig 4 piriformis stretch, glute set, TA contraction  10/28/2022: bridging, clamshells, sidelying hip abduction, quad sets, SLR    Manual Treatments:   roller stick to right ITB/glut area 10'    Modalities:  none    Communication with other providers:    Assessment:  (Response towards treatment session) (Pain Rating) 2/10 slight numbness      Advised pt to buy a cushion to sit on to see if that helps with her discomfort     Plan for Next Session: Continue per POC     Time In / Time Out:   4993-8497    Timed Code/Total Treatment Minutes:   48  10man 38te    Next Progress Note due:  11/20/22    Plan of Care Interventions:  [x] Therapeutic Exercise  [] Modalities:  [x] Therapeutic Activity     [] Ultrasound  [] Estim  [x] Gait Training      [] Cervical Traction [] Lumbar Traction  [x] Neuromuscular Re-education    [] Cold/hotpack [] Iontophoresis   [x] Instruction in HEP      [] Vasopneumatic   [] Dry Needling    [] Manual Therapy               [] Aquatic Therapy            Electronically signed by:  Ronny Dow PTA   11/15/2022, 8:50 AM

## 2022-11-16 ENCOUNTER — HOSPITAL ENCOUNTER (OUTPATIENT)
Age: 82
Discharge: HOME | End: 2022-11-16
Payer: MEDICARE

## 2022-11-16 LAB
ALBUMIN SERPL-MCNC: 4 GM/DL (ref 3.4–5)
ALP BLD-CCNC: 93 IU/L (ref 40–129)
ALT SERPL-CCNC: 26 U/L (ref 10–40)
ANION GAP SERPL CALCULATED.3IONS-SCNC: 8 MMOL/L (ref 4–16)
AST SERPL-CCNC: 32 IU/L (ref 15–37)
BILIRUB SERPL-MCNC: 0.3 MG/DL (ref 0–1)
BUN BLDV-MCNC: 26 MG/DL (ref 6–23)
CALCIUM SERPL-MCNC: 9.2 MG/DL (ref 8.3–10.6)
CHLORIDE BLD-SCNC: 100 MMOL/L (ref 99–110)
CHOLESTEROL, FASTING: 123 MG/DL
CO2: 27 MMOL/L (ref 21–32)
CREAT SERPL-MCNC: 0.8 MG/DL (ref 0.6–1.1)
GFR SERPL CREATININE-BSD FRML MDRD: >60 ML/MIN/1.73M2
GLUCOSE BLD-MCNC: 105 MG/DL (ref 70–99)
HDLC SERPL-MCNC: 65 MG/DL
LDL CHOLESTEROL CALCULATED: 48 MG/DL
POTASSIUM SERPL-SCNC: 4.6 MMOL/L (ref 3.5–5.1)
SODIUM BLD-SCNC: 135 MMOL/L (ref 135–145)
TOTAL PROTEIN: 6.7 GM/DL (ref 6.4–8.2)
TRIGLYCERIDE, FASTING: 52 MG/DL
TSH HIGH SENSITIVITY: 2.28 UIU/ML (ref 0.27–4.2)

## 2022-11-16 PROCEDURE — 80053 COMPREHEN METABOLIC PANEL: CPT

## 2022-11-16 PROCEDURE — 84443 ASSAY THYROID STIM HORMONE: CPT

## 2022-11-16 PROCEDURE — 80061 LIPID PANEL: CPT

## 2022-11-16 PROCEDURE — 36415 COLL VENOUS BLD VENIPUNCTURE: CPT

## 2022-11-18 ENCOUNTER — HOSPITAL ENCOUNTER (OUTPATIENT)
Dept: PHYSICAL THERAPY | Age: 82
Discharge: HOME | End: 2022-11-18

## 2022-11-18 NOTE — FLOWSHEET NOTE
Physical Therapy  Cancellation/No-show Note  Patient Name:  Brandi Umana  :  1940   Date:  2022  Cancelled visits to date: 0  No-shows to date: 0    For today's appointment patient:  [x]  Cancelled  []  Rescheduled appointment  []  No-show     Reason given by patient:  [x]  Patient ill  []  Conflicting appointment  []  No transportation    []  Conflict with work  []  No reason given  []  Other:     Comments:      Electronically signed by:  Eleonora De Leon PT,DPT [id number]  2022, 9:13 AM

## 2022-11-29 ENCOUNTER — HOSPITAL ENCOUNTER (OUTPATIENT)
Dept: PHYSICAL THERAPY | Age: 82
Setting detail: THERAPIES SERIES
Discharge: HOME | End: 2022-11-29
Payer: MEDICARE

## 2022-11-29 PROCEDURE — 97110 THERAPEUTIC EXERCISES: CPT

## 2022-11-29 NOTE — FLOWSHEET NOTE
Outpatient Physical Therapy  [place]           [] Phone: [phone]   Fax: [phone]  [facility]ec[facility] mccoy           [x] Phone: [phone]   Fax: [phone]        Physical Therapy Daily Treatment Note  Date:  2022    Patient Name:  Nishant Cat    :  1940  MRN: [id number]  Restrictions/Precautions: fall risk  Diagnosis:   Acute right-sided low back pain, unspecified whether sciatica present [M54.50]  Acute pain of right knee [M25.561]    Date of Injury/Surgery: about 2 months ago pt twisted R knee  Treatment Diagnosis:   M62.81 muscle weakness, R26.89 abnormality of gait  Insurance/Certification information:  1) Medicare 2) [facility]  Referring Physician:  Elodia Edge MD     PCP: Su Gao MD  Plan of care signed (Y/N):  eval faxed  Outcome Measure: Oswestry:  = 54% disability   Visit# / total visits:  8/10 then PN  Pain level: 310  in R hip and buttocks      Goals:     Patient goals:  to get rid of pain    Subjective:  Pt reports she just doesn't understand why she has pain some days and none other days. Any changes in Ambulatory Summary Sheet?   None    Objective:   decreased pain noted    COVID screening questions were asked and patient attested that there had been no contact or symptoms    Exercises: (No more than 4 columns)   Exercise/Equipment 11/8/22 11/11/22 11/15/22 Date: 22            WARM UP        NuStep   S8/A9 Lvl 3  10' S8/A9 Lvl 3  10' S8/A9 Lvl 3  10' X10' S8/A9  Lvl 3          TABLE       Glute set 10x5\" 10x5\" 10x5\"    Bridge with ball btwn knees 12x5\" 15x5\" 15x 1x15 5\" GS hold the entire time   TA contraction 10x5\" 10x5\" 10x5\" 1x10 5\"   TA contraction with march 12x B 15x B 15x 1x15 B LE    DKTC w/peanut   15x5\" 15x5\" 15x stop   Clamshells R LE 15x 15x B ----    Side lying SLR R LE 15x 1# 15x B -----    SKTC R LE 3x30\" B 3x30\" B 2x30\" B 2x30\" R LE   Fig 4 piriformis stretch  R LE 3x30\" B 3x30\" B 2x30\" B 2x30\"  R LE   Quad set x15 5\" B LE  Small roll under heels x15 5\" B LE  Small roll under heels ------    SAQ 1# 15x B med roll 1# small roll 15x B ------    SLR 1# 15x B 1# 15x B ------    STANDING       High knee marching 3 laps 3 laps 3 laps    3 way hip 15x B YTB 15x B YTB 15x B RTB 1x15 B LE   Ninfa standing ext    5x10                                    PROPRIOCEPTION                                          MODALITIES                       Other Therapeutic Activities/Education:  Pt educated on extension and trying to centralize the pain and avoiding sleeping curled up    Home Exercise Program:    11/29 - standing Ninfa extension  10/19 - SKTC, fig 4 piriformis stretch, glute set, TA contraction  10/28/2022: bridging, clamshells, sidelying hip abduction, quad sets, SLR    Manual Treatments:   Modalities:  none    Communication with other providers:    Assessment:  (Response towards treatment session) (Pain Rating) 2/10 slight numbness    Plan for Next Session: Continue per POC     Time In / Time Out: 11:20-12:05 pm    Timed Code/Total Treatment Minutes:  45'/3 therapeutic exercise    Next Progress Note due:  11/20/22    Plan of Care Interventions:  [x] Therapeutic Exercise  [] Modalities:  [x] Therapeutic Activity     [] Ultrasound  [] Estim  [x] Gait Training      [] Cervical Traction [] Lumbar Traction  [x] Neuromuscular Re-education    [] Cold/hotpack [] Iontophoresis   [x] Instruction in HEP      [] Vasopneumatic   [] Dry Needling    [] Manual Therapy               [] Aquatic Therapy            Electronically signed by:  Shelia Godwin PT,  DPT [id number]    11/29/2022, 11:34 AM

## 2022-12-07 ENCOUNTER — HOSPITAL ENCOUNTER (OUTPATIENT)
Dept: PHYSICAL THERAPY | Age: 82
Setting detail: THERAPIES SERIES
Discharge: HOME | End: 2022-12-07
Payer: MEDICARE

## 2022-12-07 PROCEDURE — 97140 MANUAL THERAPY 1/> REGIONS: CPT

## 2022-12-07 PROCEDURE — 97112 NEUROMUSCULAR REEDUCATION: CPT

## 2022-12-07 PROCEDURE — 97110 THERAPEUTIC EXERCISES: CPT

## 2022-12-07 NOTE — FLOWSHEET NOTE
Outpatient Physical Therapy  [place]           [] Phone: [phone]   Fax: [phone]  Kimberlyn Basil           [x] Phone: [phone]   Fax: [phone]        Physical Therapy Daily Treatment Note  Date:  2022    Patient Name:  Manuel Steel    :  1940  MRN: [id number]  Restrictions/Precautions: fall risk  Diagnosis:   Acute right-sided low back pain, unspecified whether sciatica present [M54.50]  Acute pain of right knee [M25.561]    Date of Injury/Surgery: about 2 months ago pt twisted R knee  Treatment Diagnosis:   M62.81 muscle weakness, R26.89 abnormality of gait  Insurance/Certification information:  1) Medicare 2) [facility]  Referring Physician:  Yogi Tavares MD     PCP: Liu Amin MD  Plan of care signed (Y/N):  eval faxed  Outcome Measure: Oswestry:  = 54% disability   Visit# / total visits:  10/10 then PN  Pain level: 2/10  in R hip and buttocks      Goals:     Patient goals:  to get rid of pain    Subjective:  Pt reports she just doesn't understand why she has pain some days and none other days. Any changes in Ambulatory Summary Sheet?   None    Objective:    worst pain in the last seven days 8/10 with numbness in leg persists   best pain in the last seven days 2-3/10 there is always something there never without    /50 Oswestry    COVID screening questions were asked and patient attested that there had been no contact or symptoms    Exercises: (No more than 4 columns)   Exercise/Equipment 11/15/22 Date: 22           WARM UP       NuStep   S8/A9 Lvl 3  10' X10' S8/A9  Lvl 3 S8/A9 Lvl 3  10'         TABLE      Glute set 10x5\"     Bridge with ball btwn knees 15x 1x15 5\" GS hold the entire time 1x15 5\" GS hold   TA contraction 10x5\" 1x10 5\" 10x5\"   TA contraction with march 15x 1x15 B LE    Clamshells R LE ----     Side lying SLR R LE -----     SKTC R LE 2x30\" B 2x30\" R LE 2x30\" RLE   Fig 4 piriformis stretch  R LE 2x30\" B 2x30\"  R LE 2x30\" RLE   Quad set ------ SAQ ------     SLR ------     STANDING      High knee marching 3 laps  3 laps   3 way hip YTB 15x B RTB 1x15 B LE RTB 15x B   Ninfa standing ext  5x10 5x 10\"   FM walk outs   7# 3reps B   FM palof press   7# 1 step out 5 presses                    PROPRIOCEPTION                                    MODALITIES                    Other Therapeutic Activities/Education:  Pt educated on extension and trying to centralize the pain and avoiding sleeping curled up    Home Exercise Program:    11/29 - standing Ninfa extension  10/19 - SKTC, fig 4 piriformis stretch, glute set, TA contraction  10/28/2022: bridging, clamshells, sidelying hip abduction, quad sets, SLR    Manual Treatments:      STM to right glut 15'    Modalities:  none    Communication with other providers:    Assessment:  (Response towards treatment session) (Pain Rating)    10/10 pain after exs and 8/10 after STM    Plan for Next Session: Continue per POC     Time In / Time Out:  7379-1141    Timed Code/Total Treatment Minutes:   46  1man 1nr 1te    Next Progress Note due:  11/20/22    Plan of Care Interventions:  [x] Therapeutic Exercise  [] Modalities:  [x] Therapeutic Activity     [] Ultrasound  [] Estim  [x] Gait Training      [] Cervical Traction [] Lumbar Traction  [x] Neuromuscular Re-education    [] Cold/hotpack [] Iontophoresis   [x] Instruction in HEP      [] Vasopneumatic   [] Dry Needling    [] Manual Therapy               [] Aquatic Therapy            Electronically signed by:  Ronny Dow PTA    12/7/2022, 8:56 AM

## 2022-12-09 ENCOUNTER — HOSPITAL ENCOUNTER (OUTPATIENT)
Dept: PHYSICAL THERAPY | Age: 82
Setting detail: THERAPIES SERIES
Discharge: HOME | End: 2022-12-09
Payer: MEDICARE

## 2022-12-09 PROCEDURE — 97112 NEUROMUSCULAR REEDUCATION: CPT

## 2022-12-09 PROCEDURE — 97140 MANUAL THERAPY 1/> REGIONS: CPT

## 2022-12-09 PROCEDURE — 97110 THERAPEUTIC EXERCISES: CPT

## 2022-12-09 NOTE — FLOWSHEET NOTE
Outpatient Physical Therapy  [place]           [] Phone: [phone]   Fax: [phone]  Yuval Nolasco           [x] Phone: [phone]   Fax: [phone]        Physical Therapy Daily Treatment Note  Date:  2022    Patient Name:  Boni Guzman    :  1940  MRN: [id number]  Restrictions/Precautions: fall risk  Diagnosis:   Acute right-sided low back pain, unspecified whether sciatica present [M54.50]  Acute pain of right knee [M25.561]    Date of Injury/Surgery: about 2 months ago pt twisted R knee  Treatment Diagnosis:   M62.81 muscle weakness, R26.89 abnormality of gait  Insurance/Certification information:  1) Medicare 2) [facility]  Referring Physician:  Mark Anthony Rose MD     PCP: Kevin Lang MD  Plan of care signed (Y/N):  eval faxed  Outcome Measure: Oswestry:  = 54% disability   Visit# / total visits:  11/10 then PN  Pain level: 3/10  in R hip and buttocks      Goals:     Patient goals:  to get rid of pain    Subjective:  Patient rates her pain 3/10 today. States that her pain begins when she is standing to fix her hair and put on her make up. Any changes in Ambulatory Summary Sheet?   None    Objective:    ttp over right glut    COVID screening questions were asked and patient attested that there had been no contact or symptoms    Exercises: (No more than 4 columns)   Exercise/Equipment 11/15/22 Date: 22            WARM UP        NuStep   S8/A9 Lvl 3  10' X10' S8/A9  Lvl 3 S8/A9 Lvl 3  10' S8/A9 Lvl 3  10'          TABLE       Glute set 10x5\"      Bridge with ball btwn knees 15x 1x15 5\" GS hold the entire time 1x15 5\" GS hold 1x15 5\" GS hold   TA contraction 10x5\" 1x10 5\" 10x5\" 10x5\"   TA contraction with march 15x 1x15 B LE     Clamshells R LE ----      Side lying SLR R LE -----      SKTC R LE 2x30\" B 2x30\" R LE 2x30\" RLE 2x30\" RLE   Fig 4 piriformis stretch  R LE 2x30\" B 2x30\"  R LE 2x30\" RLE 2x30\" RLE   Quad set ------      SAQ ------      LEXUSR ------ STANDING       High knee marching 3 laps  3 laps 3 laps   3 way hip YTB 15x B RTB 1x15 B LE RTB 15x B RTB 15x B   Ninfa standing ext  5x10 5x 10\"    FM walk outs   7# 3reps B 7# 3 laps B   FM palof press   7# 1 step out 5 presses 7# 1 step out 5 presses                      PROPRIOCEPTION                                          MODALITIES                       Other Therapeutic Activities/Education:  Pt educated on extension and trying to centralize the pain and avoiding sleeping curled up    Home Exercise Program:    11/29 - standing Ninfa extension  10/19 - SKTC, fig 4 piriformis stretch, glute set, TA contraction  10/28/2022: bridging, clamshells, sidelying hip abduction, quad sets, SLR    Manual Treatments:      STM to right glut 15'    Modalities:  none    Communication with other providers:    Assessment:  (Response towards treatment session) (Pain Rating)    Patient noted that she was feeling better after treatment. Noted that the massage had loosened her up.       Plan for Next Session: Continue per POC     Time In / Time Out:  0940/1025  Timed Code/Total Treatment Minutes:   39  1man 1nr 1te    Next Progress Note due:  11/20/22    Plan of Care Interventions:  [x] Therapeutic Exercise  [] Modalities:  [x] Therapeutic Activity     [] Ultrasound  [] Estim  [x] Gait Training      [] Cervical Traction [] Lumbar Traction  [x] Neuromuscular Re-education    [] Cold/hotpack [] Iontophoresis   [x] Instruction in HEP      [] Vasopneumatic   [] Dry Needling    [] Manual Therapy               [] Aquatic Therapy            Electronically signed by:  Hailey Kay PTA    12/9/2022, 9:40 AM

## 2022-12-12 ENCOUNTER — HOSPITAL ENCOUNTER (OUTPATIENT)
Dept: PHYSICAL THERAPY | Age: 82
Setting detail: THERAPIES SERIES
Discharge: HOME | End: 2022-12-12
Payer: MEDICARE

## 2022-12-12 PROCEDURE — 97140 MANUAL THERAPY 1/> REGIONS: CPT

## 2022-12-12 PROCEDURE — 97110 THERAPEUTIC EXERCISES: CPT

## 2022-12-12 PROCEDURE — 97112 NEUROMUSCULAR REEDUCATION: CPT

## 2022-12-12 NOTE — FLOWSHEET NOTE
Outpatient Physical Therapy  [place]           [] Phone: [phone]   Fax: [phone]  [place]           [x] Phone: [phone]   Fax: [phone]        Physical Therapy Daily Treatment Note  Date:  2022    Patient Name:  Nishant Cat    :  1940  MRN: [id number]  Restrictions/Precautions: fall risk  Diagnosis:   Acute right-sided low back pain, unspecified whether sciatica present [M54.50]  Acute pain of right knee [M25.561]    Date of Injury/Surgery: about 2 months ago pt twisted R knee  Treatment Diagnosis:   M62.81 muscle weakness, R26.89 abnormality of gait  Insurance/Certification information:  1) Medicare 2) [address] [place] [address]  Referring Physician:  Elodia Edge MD     PCP: Su Gao MD  Plan of care signed (Y/N):  eval faxed  Outcome Measure: Oswestry:  = 54% disability   Visit# / total visits:  12/10 then PN  Pain level: 7-8/10  in R hip and buttocks      Goals:     Patient goals:  to get rid of pain  Long term goal 1: Pt will demonstrate I with current HEP as prescribed in order to increase ROM and strength. Long term goal 2: Pt will demonstrate R LE strength 5/5 in order to improve strength. Long term goal 3: Pt will report worst pain in the last week no more than 4/10 in order to improve quality of life. Long term goal 4: Pt will demonstrate an Oswestry score of no  more than 40% disability in order to improve quality of life. Subjective:  Patient states that she has had more pain the last couple of days. Pain this morning is 7-8/10 in the right hip and has had the numbness into the right foot. Any changes in Ambulatory Summary Sheet?   None    Objective:    ttp over right glut    COVID screening questions were asked and patient attested that there had been no contact or symptoms    Exercises: (No more than 4 columns)   Exercise/Equipment 2022           WARM UP       NuStep   S8/A9 Lvl 3  10' S8/A9 Lvl 3  10' S8/A9 Lvl 3  10'         TABLE [] Aquatic Therapy            Electronically signed by:  FAVIO Richardson PT   12/12/2022, 9:45 AM

## 2022-12-13 NOTE — PROGRESS NOTES
Outpatient Physical Therapy           [place]           [] Phone: [phone]   Fax: [phone]  [place]           [x] Phone: [phone]   Fax: [phone]      To: Alfonso Garcia MD     From: Miguel Dyson, PT,      Patient: Karol Pearson                    : 1940  Diagnosis:  Acute right-sided low back pain, unspecified whether sciatica present [M54.50]  Acute pain of right knee [M25.561]        Treatment Diagnosis:       Date: 2022  [x]  Progress Note                []  Discharge Note    Evaluation Date:  10/19/22   Total Visits to date: 12   Cancels/No-shows to date:      Subjective:  :  Patient states that she has had more pain the last couple of days. Pain this morning is 7-8/10 in the right hip and has had the numbness into the right foot. Plan of Care/Treatment to date:  [x] Therapeutic Exercise    [] Modalities:  [x] Therapeutic Activity     [] Ultrasound  [] Electrical Stimulation  [] Gait Training      [] Cervical Traction   [] Lumbar Traction  [x] Neuromuscular Re-education  [] Cold/hotpack [] Iontophoresis  [x] Instruction in HEP      Other:  [x] Manual Therapy       []  Vasopneumatic  [] Aquatic Therapy       []   Dry Needle Therapy                    Objective/Significant Findings At Last Visit/Comments:   ttp over right glut;  Oswestry    Goal Status:  [] Achieved [] Partially Achieved  [x] Not Achieved   Patient goals:  to get rid of pain  Long term goal 1: Pt will demonstrate I with current HEP as prescribed in order to increase ROM and strength. Long term goal 2: Pt will demonstrate R LE strength 5/5 in order to improve strength. Long term goal 3: Pt will report worst pain in the last week no more than 4/10 in order to improve quality of life. Long term goal 4: Pt will demonstrate an Oswestry score of no  more than 40% disability in order to improve quality of life.  - now 46%    Rehab Potential: [] Excellent [x] Good [] Fair  [] Poor    Patient Status: [x] Waiting for physician recheck     [] Patient now discharged     [] Additional visits requested, Please re-certify for additional visits:      Requested frequency/duration:  /week for weeks  If we are requesting more visits, we fully anticipate the patient's condition is expected to improve within the treatment timeframe we are requesting. Electronically signed by:  Diego Shearer PT,, 12/13/2022, 10:29 AM    If you have any questions or concerns, please don't hesitate to call.   Thank you for your referral.    Physician Signature:______________________ Date:______ Time: ________  By signing above, therapists plan is approved by physician

## 2022-12-16 ENCOUNTER — HOSPITAL ENCOUNTER (OUTPATIENT)
Dept: PHYSICAL THERAPY | Age: 82
Setting detail: THERAPIES SERIES
Discharge: HOME | End: 2022-12-16
Payer: MEDICARE

## 2022-12-16 PROCEDURE — 97110 THERAPEUTIC EXERCISES: CPT

## 2022-12-16 PROCEDURE — 97112 NEUROMUSCULAR REEDUCATION: CPT

## 2022-12-16 PROCEDURE — 97530 THERAPEUTIC ACTIVITIES: CPT

## 2022-12-16 NOTE — FLOWSHEET NOTE
Outpatient Physical Therapy  [place]           [] Phone: [phone]   Fax: [phone]  [facility]           [x] Phone: [phone]   Fax: [phone]        Physical Therapy Daily Treatment Note  Date:  2022    Patient Name:  Sabina Riley    :  1940  MRN: [id number]  Restrictions/Precautions: fall risk  Diagnosis:   Acute right-sided low back pain, unspecified whether sciatica present [M54.50]  Acute pain of right knee [M25.561]    Date of Injury/Surgery: about 2 months ago pt twisted R knee  Treatment Diagnosis:   M62.81 muscle weakness, R26.89 abnormality of gait  Insurance/Certification information:  1) Medicare 2) [facility]  Referring Physician:  Leonidas Koroma MD     PCP: Jesus Alberto Gan MD  Plan of care signed (Y/N):  eval faxed  Outcome Measure: Oswestry:  = 54% disability   Visit# / total visits:  12/10 then PN  Pain level: 7-8/10  in R hip and buttocks      Goals:     Patient goals:  to get rid of pain  Long term goal 1: Pt will demonstrate I with current HEP as prescribed in order to increase ROM and strength. Long term goal 2: Pt will demonstrate R LE strength 5/5 in order to improve strength. Long term goal 3: Pt will report worst pain in the last week no more than 4/10 in order to improve quality of life. Long term goal 4: Pt will demonstrate an Oswestry score of no  more than 40% disability in order to improve quality of life. Subjective:  Still having good days and bad days. Would like to hold therapy next week and then come back the following week to prepare for her appointment with the doctor. Any changes in Ambulatory Summary Sheet?   None    Objective:    Good form noted with all exercises  COVID screening questions were asked and patient attested that there had been no contact or symptoms    Exercises: (No more than 4 columns)   Exercise/Equipment 2022            WARM UP        NuStep   S8/A9 Lvl 3  10' S8/A9 Lvl 3  10' S8/A9 Lvl 3  10' S8/A9 Lvl 3  10'          TABLE       Glute set       Bridge with ball btwn knees 1x15 5\" GS hold 1x15 5\" GS hold 1x15 5\" GS hold 1x15 5\" GS hold   TA contraction 10x5\" 10x5\" 10x5\" 10x5\"   TA contraction with march       Clamshells R LE       Side lying SLR R LE       SKTC R LE 2x30\" RLE 2x30\" RLE 2x30\" R LE 2x30\" R LE   Fig 4 piriformis stretch  R LE 2x30\" RLE 2x30\" RLE 2x30\" R LE 2x30\" R LE   Quad set       SAQ       SLR       STANDING       High knee marching 3 laps 3 laps 3 laps 3 laps   3 way hip RTB 15x B RTB 15x B RTB 15x B RTB 15x B   Ninfa standing ext 5x 10\"      FM walk outs 7# 3reps B 7# 3 laps B 7#  5 laps B 7#  5 laps B   FM palof press 7# 1 step out 5 presses 7# 1 step out 5 presses 7# 1 step our 5 presses 7# 1 step our 5 presses                      PROPRIOCEPTION                                          MODALITIES                       Other Therapeutic Activities/Education:  Pt educated on extension and trying to centralize the pain and avoiding sleeping curled up    Home Exercise Program:    11/29 - standing Ninfa extension  10/19 - SKTC, fig 4 piriformis stretch, glute set, TA contraction  10/28/2022: bridging, clamshells, sidelying hip abduction, quad sets, SLR    Manual Treatments:      STM to right glut 15'    Modalities:  none    Communication with other providers:    Assessment:  (Response towards treatment session) (Pain Rating)    Patient rated her pain 1/10 after treatment stating that she was feeling a lot better. Patient given updated handouts and RTB to continue HEP next week. Scheduled for the following week.     Plan for Next Session: Continue per POC     Time In / Time Out:  8751/3359    Timed Code/Total Treatment Minutes:   36  1ta 1nr 1te    Next Progress Note due:  11/20/22    Plan of Care Interventions:  [x] Therapeutic Exercise  [] Modalities:  [x] Therapeutic Activity     [] Ultrasound  [] Estim  [x] Gait Training      [] Cervical Traction [] Lumbar Traction  [x] Neuromuscular Re-education    [] Cold/hotpack [] Iontophoresis   [x] Instruction in HEP      [] Vasopneumatic   [] Dry Needling    [] Manual Therapy               [] Aquatic Therapy            Electronically signed by:  FAVIO Lopez PT   12/16/2022, 8:57 AM

## 2022-12-20 ENCOUNTER — OFFICE VISIT (OUTPATIENT)
Dept: CARDIOLOGY CLINIC | Age: 82
End: 2022-12-20

## 2022-12-20 VITALS
SYSTOLIC BLOOD PRESSURE: 120 MMHG | HEIGHT: 63 IN | BODY MASS INDEX: 24.1 KG/M2 | DIASTOLIC BLOOD PRESSURE: 60 MMHG | WEIGHT: 136 LBS | HEART RATE: 60 BPM

## 2022-12-20 DIAGNOSIS — I49.3 PVCS (PREMATURE VENTRICULAR CONTRACTIONS): Primary | ICD-10-CM

## 2022-12-20 NOTE — PROGRESS NOTES
CARDIOLOGY NOTE      12/20/2022    RE: Arin Hilton  (1940)                               TO:  Dr. Owen Looney MD            Lupe Castellanos is a 78 y.o. female who was seen today for management of  pvcs                                    HPI:                   Pt has h/o htn, hyperlipidimea, PVCs, seen today for  fu.  Pt has  No cardiac complains    Arin Hilton has the following history recorded in care path:  Patient Active Problem List    Diagnosis Date Noted    Acute right-sided low back pain with right-sided sciatica 10/10/2022    Acute pain of right knee 10/10/2022    Age-related osteoporosis without current pathological fracture 08/31/2022    Gastroesophageal reflux disease without esophagitis 11/28/2017    Ventricular tachycardia 08/01/2017    Cyst of right ovary 09/06/2016    Heart palpitations 11/20/2014    Colitis 11/14/2013    Essential hypertension     Mixed hyperlipidemia     PVCs (premature ventricular contractions)     H/O mitral valve prolapse     Liver dysfunction     Acquired hypothyroidism      Current Outpatient Medications   Medication Sig Dispense Refill    docusate sodium (COLACE) 100 MG capsule Take 1 capsule by mouth daily as needed for Constipation 30 capsule 2    Magnesium 125 MG CAPS Take 250 mg by mouth daily 90 capsule 1    atorvastatin (LIPITOR) 10 MG tablet TAKE 1 TABLET EVERY DAY 90 tablet 1    levothyroxine (SYNTHROID) 50 MCG tablet TAKE 1 TABLET BY MOUTH DAILY 90 tablet 1    benazepril (LOTENSIN) 10 MG tablet TAKE 1 TABLET BY MOUTH EVERY DAY 90 tablet 1    aspirin 81 MG tablet Take 81 mg by mouth daily      lactobacillus (CULTURELLE) capsule Take 1 capsule by mouth 2 times daily (with meals) 30 capsule 0    Coenzyme Q10 (CO Q 10) 100 MG CAPS Take by mouth daily       Cholecalciferol (VITAMIN D3) 2000 UNITS CAPS Take 1 capsule by mouth daily       Multiple Vitamins-Minerals (MULTIVITAMIN & MINERAL PO) Take 1 tablet by mouth daily      calcium carbonate (OSCAL) 500 MG TABS tablet Take 500 mg by mouth daily      dicyclomine (BENTYL) 10 MG capsule Take 1 capsule by mouth 4 times daily as needed (abdominal cramps) (Patient not taking: Reported on 12/20/2022) 120 capsule 1     No current facility-administered medications for this visit. Allergies: Amiodarone and Amoxicillin  Past Medical History:   Diagnosis Date    Acute deep vein thrombosis (DVT) of distal vein of left lower extremity ([address] 10/23/2016    DVT involving left peroneal, posterior and anterior tibial veins 10/16 Treated for at least 3 months and repeat venous doppler was negative and was discontinued     Axillary adenopathy 8/28/2014    US of axilla 6/14 showed benign lymph nodes    C. difficile diarrhea 11/18/2016    Treated few times and now is normal    CAD (coronary artery disease)     Chest discomfort 8/1/2017    Colitis 12/13    EGD,COLONOSCOPY, SBFT normal. Dr. Gaynelle Ahumada    Cyst of right ovary 9/6/2016    Seen Dr Camilla Collado and had several US per pt. Dizzy spells 6/5/2017    H/O 24 hour EKG monitoring 7/27/09    NSR, few PVCs and occasional couplet noted (total 1007 PVCs), no pauses noted. H/O colonoscopy 10/09, 12/13    f/u in 5 years    H/O echocardiogram 9/17/12    Normal LV size and function, mild mitral and tricuspid regurg., EF 60%. H/O mitral valve prolapse 2003    MR mild    H/O myocardial perfusion scan 12/16/14    Stress Cardiolite. Normal perfusion in the distribution of all coronaries, normal LV size and function, EF 70%. H/O screening mammography 8/11    Dr Víctor London    History of cardiac monitoring 06/05/2017    Abnormal-SR with complex ventricular arrhythmias and frequent PVCs. No symptoms during marked bradycardia or during the fastest rate of 143 with nonsustained three-beat runs of ventricular tachycardia.      History of cardiac monitoring 08/23/2017    Abnormal event monitor findings suggestive of predominantly sinus bradycardia with isolated and frequent low-grade ventricular ectopy with symptoms reported. Hx of Doppler ultrasound 10/17/2017    duplex venous doppler-minimal reflux noted in the left CFV    Hyperlipidemia     Hypertension     Hypothyroidism     Liver dysfunction     liver bx 10/09,mild steaotosis    Osteoarthritis of cervical spine 6/7/2016    Ovarian cyst     Papanicolaou smear 09/14/2016    Dr. Heidi Bill    PVCs (premature ventricular contractions)     symptomatic frequent PVCs, sees Dr. Devin Major    Ventricular tachycardia 8/1/2017    Exercise induced 8/1/17     Past Surgical History:   Procedure Laterality Date    APPENDECTOMY      BREAST BIOPSY      CATARACT REMOVAL Bilateral     COLONOSCOPY  12/3/13,2-28-17 2.28.17 no need for follow up due to pt's advanced age per Dr. Kelly Brand. DIAGNOSTIC CARDIAC CATH LAB PROCEDURE  08/03/2017    Normal coronaries, EF 70%. HYSTERECTOMY (CERVIX STATUS UNKNOWN)  1961    LIVER BIOPSY  10/09    steasosis    UPPER GASTROINTESTINAL ENDOSCOPY  11/02/2017    Dr. Kelly Brand      As reviewed   Family History   Problem Relation Age of Onset    Heart Surgery Mother         CABG    Rheum Arthritis Mother     Osteoarthritis Mother     Hypertension Mother     High Cholesterol Mother     Migraines Mother     Stroke Mother     Heart Disease Mother     Heart Disease Brother     Heart Attack Father     Heart Disease Father     Heart Failure Maternal Grandmother     Heart Disease Brother      Social History     Tobacco Use    Smoking status: Never    Smokeless tobacco: Never   Substance Use Topics    Alcohol use: Yes     Alcohol/week: 0.0 standard drinks     Comment:  1 beers per day or 2 glasses of \"bubbly\"/day      Review of Systems:    Constitutional: Negative for diaphoresis and fatigue  Psychological:Negative for anxiety or depression  HENT: Negative for headaches, nasal congestion, sinus pain or vertigo  Eyes: Negative for visual disturbance.    Endocrine: Negative for polydipsia/polyuria  Respiratory: Negative for shortness of breath  Cardiovascular: Negative for chest pain, dyspnea on exertion, claudication, edema, irregular heartbeat, murmur, palpitations or shortness of breath  Gastrointestinal: Negative for abdominal pain or heartburn  Genito-Urinary: Negative for urinary frequency/urgency  Musculoskeletal: Negative for muscle pain, muscular weakness, negative for pain in arm and leg or swelling in foot and leg  Neurological: Negative for dizziness, headaches, memory loss, numbness/tingling, visual changes, syncope  Dermatological: Negative for rash    Objective:    Vitals:    12/20/22 1112   BP: 120/60   Site: Left Upper Arm   Position: Sitting   Cuff Size: Medium Adult   Pulse: 60   Weight: 136 lb (61.7 kg)   Height: 5' 3\" (1.6 m)       /60 (Site: Left Upper Arm, Position: Sitting, Cuff Size: Medium Adult)   Pulse 60   Ht 5' 3\" (1.6 m)   Wt 136 lb (61.7 kg)   BMI 24.09 kg/m²     No flowsheet data found. Wt Readings from Last 3 Encounters:   12/20/22 136 lb (61.7 kg)   11/10/22 137 lb (62.1 kg)   10/10/22 133 lb 12.8 oz (60.7 kg)     Body mass index is 24.09 kg/m². GENERAL - Alert, oriented, pleasant, in no apparent distress. EYES: No jaundice, no conjunctival pallor. SKIN: It is warm & dry. No rashes. No Echhymosis    HEENT - No clinically significant abnormalities seen. Neck - Supple. No jugular venous distention noted. No carotid bruits. Cardiovascular - Normal S1 and S2 without obvious murmur or gallop. Extremities - No cyanosis, clubbing, or significant edema. Pulmonary - No respiratory distress. No wheezes or rales. Abdomen - No masses, tenderness, or organomegaly. Musculoskeletal - No significant edema. No joint deformities. No muscle wasting. Neurologic - Cranial nerves II through XII are grossly intact. There were no gross focal neurologic abnormalities.     Lab Review   Lab Results   Component Value Date/Time    CKTOTAL 152 09/14/2012 10:45 AM    CKMB 2.4 09/14/2012 10:45 AM    TROPONINT <0.010 01/09/2020 10:46 AM     BNP:  No results found for: BNP  PT/INR:    Lab Results   Component Value Date    INR 0.91 10/17/2017     Lab Results   Component Value Date    LABA1C 5.5 11/13/2019    LABA1C 5.3 11/11/2013     Lab Results   Component Value Date    WBC 8.5 03/16/2022    HCT 41.7 03/16/2022    MCV 91.0 03/16/2022     03/16/2022     Lab Results   Component Value Date    CHOL 137 02/11/2017    TRIG 62 02/11/2017    HDL 65 11/16/2022    LDLCALC 48 11/16/2022    LDLDIRECT 64 12/04/2020     Lab Results   Component Value Date    ALT 26 11/16/2022    AST 32 11/16/2022     BMP:    Lab Results   Component Value Date/Time     11/16/2022 07:30 AM    K 4.6 11/16/2022 07:30 AM     11/16/2022 07:30 AM    CO2 27 11/16/2022 07:30 AM    BUN 26 11/16/2022 07:30 AM    CREATININE 0.8 11/16/2022 07:30 AM     CMP:   Lab Results   Component Value Date/Time     11/16/2022 07:30 AM    K 4.6 11/16/2022 07:30 AM     11/16/2022 07:30 AM    CO2 27 11/16/2022 07:30 AM    BUN 26 11/16/2022 07:30 AM    PROT 6.7 11/16/2022 07:30 AM    PROT 7.1 09/27/2012 11:00 AM     TSH:    Lab Results   Component Value Date/Time    TSH 1.22 05/25/2021 09:28 AM    TSHHS 2.280 11/16/2022 07:30 AM           Assessment & Plan:    -  Hypertension: Patients blood pressure is normal. Patient is advised about low sodium diet. Present medical regimen will not be changed. on lotensin  Blood pressure is well controlled we will continue    -Hypothyroidism patient is on Synthroid 50 mcg p.o. daily per PCP                  -  LIPID MANAGEMENT:  Importance of lipid levels discussed with patient   and patient was given dietary advice. NCEP- ATP III guidelines reviewed with patient. -   Changes  in medicines made:  No                       On lipitor compliant we will continue LDL per PCP         - PVcs  Ep fu  Patient wants to follow-up with EP again the note below is from her previous visit with EP the options of ablation and pharmacotherapy has been discussed in detail with her in the past and I discussed with her again today    Per EP  Patient has been walking into the clinic has PVCs and nonsustained short run of four beats. Discussed with the patient about the importance of considering therapy for this kind of rhythm issue. Patient though is very stressed about that just talking about it so I tried to calm her down. I explained her in detail. Patient reports she is not having any symptoms and she is doing well she is taking her magnesium. Discussed with the patient if she does not want to do ablation of please consider something like sotalol which could be reasonable. Patient wants it written down so I wrote down the medication name and she is going to research on it think about it and let us know. She understands all the risks about waiting.        Padmini Garcia MD    [facility] [place]

## 2022-12-20 NOTE — PATIENT INSTRUCTIONS
**It is YOUR responsibilty to bring medication bottles and/or updated medication list to [address] [place] [address]. This will allow us to better serve you and all your healthcare needs**    [facility] Locations - No appointment necessary. Sites open Monday to Friday. Call your preferred location for test preparation, business hours and other information you need. [employer] accepts [employer]. Raphine URBANPATRICIO   [facility] [address] [facility], [address] [facility]  Phone: [phone] [facility] [address] [facility]  [address]. [place], [address] [facility]  Phone: [phone]     Please be informed that if you contact our office outside of normal business hours the physician on call cannot help with any scheduling or rescheduling issues, procedure instruction questions or any type of medication issue. We advise you for any urgent/emergency that you go to the nearest emergency room! PLEASE CALL OUR OFFICE DURING NORMAL BUSINESS HOURS    Monday - Friday   8 am to 5 pm    [address]: [phone]    [place]:  [phone]    Thank you for allowing us to care for you today! We want to ensure we can follow your treatment plan and we strive to give you the best outcomes and experience possible. If you ever have a life threatening emergency and call 911 - for an ambulance (EMS)   Our providers can only care for you at:   [place] or [facility]. Even if you have someone take you or you drive yourself we can only care for you in a [facility] facility. Our providers are not setup at the other healthcare locations!

## 2022-12-28 ENCOUNTER — APPOINTMENT (OUTPATIENT)
Dept: PHYSICAL THERAPY | Age: 82
End: 2022-12-28
Payer: MEDICARE

## 2022-12-30 ENCOUNTER — HOSPITAL ENCOUNTER (OUTPATIENT)
Dept: PHYSICAL THERAPY | Age: 82
Setting detail: THERAPIES SERIES
Discharge: HOME | End: 2022-12-30
Payer: MEDICARE

## 2022-12-30 PROCEDURE — 97110 THERAPEUTIC EXERCISES: CPT

## 2022-12-30 PROCEDURE — 97112 NEUROMUSCULAR REEDUCATION: CPT

## 2022-12-30 PROCEDURE — 97530 THERAPEUTIC ACTIVITIES: CPT

## 2022-12-30 NOTE — FLOWSHEET NOTE
Outpatient Physical Therapy  [place]           [] Phone: [phone]   Fax: [phone]  [place]           [x] Phone: [phone]   Fax: [phone]        Physical Therapy Daily Treatment Note  Date:  2022    Patient Name:  Sasha العلي    :  1940  MRN: [id number]  Restrictions/Precautions: fall risk  Diagnosis:   Acute right-sided low back pain, unspecified whether sciatica present [M54.50]  Acute pain of right knee [M25.561]    Date of Injury/Surgery: about 2 months ago pt twisted R knee  Treatment Diagnosis:   M62.81 muscle weakness, R26.89 abnormality of gait  Insurance/Certification information:  1) Medicare 2) [address] [place] [address]  Referring Physician:  Lucia Lee MD     PCP: Marjorie Hope MD  Plan of care signed (Y/N):  eval faxed  Outcome Measure: Oswestry:  = 54% disability   Visit# / total visits:  14/10 then PN  Pain level: 3/10  in R hip and buttocks      Goals:     Patient goals:  to get rid of pain  Long term goal 1: Pt will demonstrate I with current HEP as prescribed in order to increase ROM and strength. Long term goal 2: Pt will demonstrate R LE strength 5/5 in order to improve strength. Long term goal 3: Pt will report worst pain in the last week no more than 4/10 in order to improve quality of life. Long term goal 4: Pt will demonstrate an Oswestry score of no  more than 40% disability in order to improve quality of life. Subjective:   patient reports of 3/10 pain upon arrival in her R hip and down into her leg         Any changes in Ambulatory Summary Sheet?   None      Objective:    Good form noted with all exercises    COVID screening questions were asked and patient attested that there had been no contact or symptoms    Exercises: (No more than 4 columns)   Exercise/Equipment 2022           WARM UP       NuStep   S8/A9 Lvl 3  10' S8/A9 Lvl 3  10' S8/A9 Lvl 3  10'         TABLE      Glute set      Bridge with ball btwn knees 1x15 5\" GS hold 1x15 5\" GS hold 1x15 5\" GS hold   TA contraction 10x5\" 10x5\" 10x5\"   TA contraction with march      Clamshells R LE      Side lying SLR R LE      SKTC R LE 2x30\" R LE 2x30\" R LE 2x30\" R LE   Fig 4 piriformis stretch  R LE 2x30\" R LE 2x30\" R LE 2x30\" R LE   Quad set      SAQ      SLR      STANDING      High knee marching 3 laps 3 laps 3 laps   3 way hip RTB 15x B RTB 15x B RTB 15x B   Ninfa standing ext      FM walk outs 7#  5 laps B 7#  5 laps B 7#  5 laps B   FM palof press 7# 1 step our 5 presses 7# 1 step our 5 presses 7# 1 step our 5 presses                    PROPRIOCEPTION                                    MODALITIES                    Other Therapeutic Activities/Education:  Pt educated on extension and trying to centralize the pain and avoiding sleeping curled up    Home Exercise Program:    11/29 - standing Ninfa extension  10/19 - SKTC, fig 4 piriformis stretch, glute set, TA contraction  10/28/2022: bridging, clamshells, sidelying hip abduction, quad sets, SLR    Manual Treatments:      STM to right glut 15'    Modalities:  none    Communication with other providers:    Assessment:  (Response towards treatment session) (Pain Rating)    Patient rated her pain during treatment a 5/10 and 3/10 after treatment.       Plan for Next Session: Continue per POC     Time In / Time Out:   2534-3693    Timed Code/Total Treatment Minutes:   43 1te 1ta 1nr    Next Progress Note due:  11/20/22    Plan of Care Interventions:  [x] Therapeutic Exercise  [] Modalities:  [x] Therapeutic Activity     [] Ultrasound  [] Estim  [x] Gait Training      [] Cervical Traction [] Lumbar Traction  [x] Neuromuscular Re-education    [] Cold/hotpack [] Iontophoresis   [x] Instruction in HEP      [] Vasopneumatic   [] Dry Needling    [] Manual Therapy               [] Aquatic Therapy            Electronically signed by:  Benja De Leon PTA     12/30/2022, 8:58 AM

## 2023-01-01 NOTE — PROGRESS NOTES
Current Outpatient Medications   Medication Sig Dispense Refill    levothyroxine (SYNTHROID) 50 MCG tablet TAKE 1 TABLET BY MOUTH DAILY 90 tablet 1    atorvastatin (LIPITOR) 10 MG tablet TAKE 1 TABLET EVERY DAY 90 tablet 1    benazepril (LOTENSIN) 20 MG tablet TAKE 1 TABLET BY MOUTH DAILY 90 tablet 1    aspirin 81 MG tablet Take 81 mg by mouth daily Take 2 tabs by mouth in am and one tab at hs      dicyclomine (BENTYL) 10 MG capsule Take 1 capsule by mouth 2 times daily as needed (abdominal cramps) 180 capsule 1    Magnesium 125 MG CAPS Take by mouth daily      lactobacillus (CULTURELLE) capsule Take 1 capsule by mouth 2 times daily (with meals) 30 capsule 0    Coenzyme Q10 (CO Q 10) 100 MG CAPS Take by mouth daily       Cholecalciferol (VITAMIN D3) 2000 UNITS CAPS Take 1 capsule by mouth daily       Multiple Vitamins-Minerals (MULTIVITAMIN & MINERAL PO) Take 1 tablet by mouth daily      calcium carbonate (OSCAL) 500 MG TABS tablet Take 500 mg by mouth daily      nitroGLYCERIN (NITROSTAT) 0.4 MG SL tablet Place 1 tablet under the tongue every 5 minutes as needed for Chest pain (Patient not taking: Reported on 12/4/2019) 25 tablet 3     No current facility-administered medications for this visit. Past Medical History:   Diagnosis Date    Acute deep vein thrombosis (DVT) of distal vein of left lower extremity ([address] 10/23/2016    DVT involving left peroneal, posterior and anterior tibial veins 10/16 Treated for at least 3 months and repeat venous doppler was negative and was discontinued     Axillary adenopathy 8/28/2014    US of axilla 6/14 showed benign lymph nodes    C. difficile diarrhea 11/18/2016    Treated few times and now is normal    CAD (coronary artery disease)     Chest discomfort 8/1/2017    Colitis 12/13    EGD,COLONOSCOPY, SBFT normal. Dr. Leone Petsarah    Cyst of right ovary 9/6/2016    Seen Dr Neva Bautista and had several US per pt.     Dizzy spells 6/5/2017    H/O 24 hour EKG monitoring
Statement Selected

## 2023-01-11 ENCOUNTER — TELEPHONE (OUTPATIENT)
Dept: INTERNAL MEDICINE CLINIC | Age: 83
End: 2023-01-11

## 2023-01-11 ENCOUNTER — OFFICE VISIT (OUTPATIENT)
Dept: INTERNAL MEDICINE CLINIC | Age: 83
End: 2023-01-11
Payer: MEDICARE

## 2023-01-11 VITALS
BODY MASS INDEX: 24.09 KG/M2 | WEIGHT: 136 LBS | RESPIRATION RATE: 12 BRPM | TEMPERATURE: 97.2 F | OXYGEN SATURATION: 99 % | DIASTOLIC BLOOD PRESSURE: 66 MMHG | SYSTOLIC BLOOD PRESSURE: 120 MMHG | HEART RATE: 48 BPM

## 2023-01-11 DIAGNOSIS — I49.3 PVCS (PREMATURE VENTRICULAR CONTRACTIONS): ICD-10-CM

## 2023-01-11 DIAGNOSIS — I47.20 VENTRICULAR TACHYCARDIA: ICD-10-CM

## 2023-01-11 DIAGNOSIS — E78.2 MIXED HYPERLIPIDEMIA: ICD-10-CM

## 2023-01-11 DIAGNOSIS — I10 ESSENTIAL HYPERTENSION: ICD-10-CM

## 2023-01-11 DIAGNOSIS — E03.9 ACQUIRED HYPOTHYROIDISM: ICD-10-CM

## 2023-01-11 DIAGNOSIS — M54.41 ACUTE RIGHT-SIDED LOW BACK PAIN WITH RIGHT-SIDED SCIATICA: Primary | ICD-10-CM

## 2023-01-11 PROBLEM — M25.561 ACUTE PAIN OF RIGHT KNEE: Status: RESOLVED | Noted: 2022-10-10 | Resolved: 2023-01-11

## 2023-01-11 PROCEDURE — 3074F SYST BP LT 130 MM HG: CPT | Performed by: INTERNAL MEDICINE

## 2023-01-11 PROCEDURE — G8420 CALC BMI NORM PARAMETERS: HCPCS | Performed by: INTERNAL MEDICINE

## 2023-01-11 PROCEDURE — 1090F PRES/ABSN URINE INCON ASSESS: CPT | Performed by: INTERNAL MEDICINE

## 2023-01-11 PROCEDURE — 1123F ACP DISCUSS/DSCN MKR DOCD: CPT | Performed by: INTERNAL MEDICINE

## 2023-01-11 PROCEDURE — 99214 OFFICE O/P EST MOD 30 MIN: CPT | Performed by: INTERNAL MEDICINE

## 2023-01-11 PROCEDURE — G8427 DOCREV CUR MEDS BY ELIG CLIN: HCPCS | Performed by: INTERNAL MEDICINE

## 2023-01-11 PROCEDURE — G8399 PT W/DXA RESULTS DOCUMENT: HCPCS | Performed by: INTERNAL MEDICINE

## 2023-01-11 PROCEDURE — G8484 FLU IMMUNIZE NO ADMIN: HCPCS | Performed by: INTERNAL MEDICINE

## 2023-01-11 PROCEDURE — 1036F TOBACCO NON-USER: CPT | Performed by: INTERNAL MEDICINE

## 2023-01-11 PROCEDURE — 3078F DIAST BP <80 MM HG: CPT | Performed by: INTERNAL MEDICINE

## 2023-01-11 RX ORDER — LEVOTHYROXINE SODIUM 0.05 MG/1
TABLET ORAL
Qty: 90 TABLET | Refills: 1 | Status: SHIPPED | OUTPATIENT
Start: 2023-01-11

## 2023-01-11 RX ORDER — ATORVASTATIN CALCIUM 10 MG/1
TABLET, FILM COATED ORAL
Qty: 90 TABLET | Refills: 1 | Status: SHIPPED | OUTPATIENT
Start: 2023-01-11

## 2023-01-11 RX ORDER — BENAZEPRIL HYDROCHLORIDE 10 MG/1
TABLET ORAL
Qty: 90 TABLET | Refills: 1 | Status: SHIPPED | OUTPATIENT
Start: 2023-01-11

## 2023-01-11 ASSESSMENT — PATIENT HEALTH QUESTIONNAIRE - PHQ9
1. LITTLE INTEREST OR PLEASURE IN DOING THINGS: 0
2. FEELING DOWN, DEPRESSED OR HOPELESS: 0
SUM OF ALL RESPONSES TO PHQ QUESTIONS 1-9: 0
SUM OF ALL RESPONSES TO PHQ9 QUESTIONS 1 & 2: 0
SUM OF ALL RESPONSES TO PHQ QUESTIONS 1-9: 0

## 2023-01-11 NOTE — PROGRESS NOTES
Yolette Vega  Patient's  is 1940  Seen in office on 2023      SUBJECTIVE:  Doyal Liter keena 78 y. o.year old female presents today   Chief Complaint   Patient presents with    Follow-up     Still having pain in right hip    Medication Refill     Pt is here for f/u  C/o pain in the right ischium and radiating down the right leg and laterally right leg   Off and on. Had PT and it did help. But she still has off-and-on pain. No problems controlling bladder or bowel  No saddle anesthesia  No fever or chills  No weakness of the legs. Patient has hypertension. Taking medications No headaches, no chest pain, no palpitations and no dizziness. Patient has hyperlipidemia. Taking medications. No abdominal pain, no nausea or vomiting. No myalgias. She has history of cardiac arrhythmias. No palpitations and no syncope or near syncope. Her      Taking medications regularly. No side effects noted. Review of Systems  Review of system is normal except as in HPI    OBJECTIVE: /66   Pulse (!) 48 Comment: irreg  Temp 97.2 °F (36.2 °C) (Temporal)   Resp 12   Wt 136 lb (61.7 kg)   SpO2 99%   BMI 24.09 kg/m²     Wt Readings from Last 3 Encounters:   23 136 lb (61.7 kg)   22 136 lb (61.7 kg)   11/10/22 137 lb (62.1 kg)      GENERAL: - Alert, oriented, pleasant, in no apparent distress. HEENT: - Conjunctiva pink, no scleral icterus. ENT clear. NECK: -Supple. No jugular venous distention noted. No masses felt,  CARDIOVASCULAR: - Normal S1 and S2    PULMONARY: - No respiratory distress. No wheezes or rales. ABDOMEN: - Soft and non-tender,no masses  ororganomegaly. EXTREMITIES: - No cyanosis, clubbing, or significant edema. SKIN: Skin is warm and dry. NEUROLOGICAL: - Cranial nerves II through XII are grossly intact. IMPRESSION:    Encounter Diagnoses   Name Primary?     Acute right-sided low back pain with right-sided sciatica Yes    Mixed hyperlipidemia     Acquired hypothyroidism Essential hypertension     PVCs (premature ventricular contractions)     Ventricular tachycardia        ASSESSMENT/PLAN:    1. Acute right-sided low back pain with right-sided sciatica   Patient has pain in the right buttock radiating down the right leg. She had physical therapy without complete recovery. She still has pain and that bothers her. X-rays showed some degenerative arthritis. We will get a MRI of the lumbar spine to decide further treatment.  -     MRI LUMBAR SPINE [address]; Future  2. Mixed hyperlipidemia  Overview:  Patient on lipitor 10 mg daily  for hyperlipidemia  Lipid profile is good  Orders:  -     atorvastatin (LIPITOR) 10 MG tablet; TAKE 1 TABLET EVERY DAY, Disp-90 tablet, R-1Normal  3. Acquired hypothyroidism  Overview:  Pt is taking synthroid 50 mcg daily  Orders:  -     levothyroxine (SYNTHROID) 50 MCG tablet; TAKE 1 TABLET BY MOUTH DAILY, Disp-90 tablet, R-1Normal  4. Essential hypertension  Overview:  Patient has HTN for > 10 years  On benazepril 10 mg daily   5. PVCs (premature ventricular contractions)  Overview:  asymptomatic frequent PVCs as per 1/2016 event monitor  Pt was on amiodarone therapy but she stopped it in 5/15. Dr. Nathan Zee knows that. VT and PVCs on stress test:   Exercise induced 8/1/17  Cath normal coronories  Referred to Dr Uzair Holcomb. Recommended EP study. Pt declined. Pt has seen [facility] for 2nd opinion and Dr Sruthi Banks did not recommend EP study  Has appointment with Dr Uzair Holcomb in 9/2021   6. Ventricular tachycardia  Overview:  Exercise induced 8/1/17Cath normal coronories  Referred to Dr Uzair Holcomb. Recommended EP study  He is planning to start her on some medication in hospital.   She will decide : but patient decide against it   Pt's pulse was 48 but heart rate on listening was 80    Orders Placed This Encounter   Procedures     [facility] reviewed with the patient. Continue current medications.   Appropriate prescriptions are addressed. After visit summeryprovided. Follow up as directed sooner if needed. Questions answered and patient verbalizes understanding. Call for any problems, questions, or concerns. Allergies   Allergen Reactions    Amiodarone      Severe hair loss    Amoxicillin Other (See Comments)     Pt states she got C-diff. , so she doesn't like atb     Current Outpatient Medications   Medication Sig Dispense Refill    docusate sodium (COLACE) 100 MG capsule Take 1 capsule by mouth daily as needed for Constipation 30 capsule 2    Magnesium 125 MG CAPS Take 250 mg by mouth daily 90 capsule 1    atorvastatin (LIPITOR) 10 MG tablet TAKE 1 TABLET EVERY DAY 90 tablet 1    levothyroxine (SYNTHROID) 50 MCG tablet TAKE 1 TABLET BY MOUTH DAILY 90 tablet 1    benazepril (LOTENSIN) 10 MG tablet TAKE 1 TABLET BY MOUTH EVERY DAY 90 tablet 1    aspirin 81 MG tablet Take 81 mg by mouth daily      lactobacillus (CULTURELLE) capsule Take 1 capsule by mouth 2 times daily (with meals) 30 capsule 0    Coenzyme Q10 (CO Q 10) 100 MG CAPS Take by mouth daily       Cholecalciferol (VITAMIN D3) 2000 UNITS CAPS Take 1 capsule by mouth daily       Multiple Vitamins-Minerals (MULTIVITAMIN & MINERAL PO) Take 1 tablet by mouth daily      calcium carbonate (OSCAL) 500 MG TABS tablet Take 500 mg by mouth daily       No current facility-administered medications for this visit.      Past Medical History:   Diagnosis Date    Acute deep vein thrombosis (DVT) of distal vein of left lower extremity ([address] 10/23/2016    DVT involving left peroneal, posterior and anterior tibial veins 10/16 Treated for at least 3 months and repeat venous doppler was negative and was discontinued     Axillary adenopathy 8/28/2014    US of axilla 6/14 showed benign lymph nodes    C. difficile diarrhea 11/18/2016    Treated few times and now is normal    CAD (coronary artery disease)     Chest discomfort 8/1/2017    Colitis 12/13    EGD,COLONOSCOPY, SBFT normal. Dr. Warren Render of right ovary 9/6/2016    Seen Dr Latha Gregorio and had several US per pt. Dizzy spells 6/5/2017    H/O 24 hour EKG monitoring 7/27/09    NSR, few PVCs and occasional couplet noted (total 1007 PVCs), no pauses noted. H/O colonoscopy 10/09, 12/13    f/u in 5 years    H/O echocardiogram 9/17/12    Normal LV size and function, mild mitral and tricuspid regurg., EF 60%. H/O mitral valve prolapse 2003    MR mild    H/O myocardial perfusion scan 12/16/14    Stress Cardiolite. Normal perfusion in the distribution of all coronaries, normal LV size and function, EF 70%. H/O screening mammography 8/11    Dr Mary Grider    History of cardiac monitoring 06/05/2017    Abnormal-SR with complex ventricular arrhythmias and frequent PVCs. No symptoms during marked bradycardia or during the fastest rate of 143 with nonsustained three-beat runs of ventricular tachycardia. History of cardiac monitoring 08/23/2017    Abnormal event monitor findings suggestive of predominantly sinus bradycardia with isolated and frequent low-grade ventricular ectopy with symptoms reported. Hx of Doppler ultrasound 10/17/2017    duplex venous doppler-minimal reflux noted in the left CFV    Hyperlipidemia     Hypertension     Hypothyroidism     Liver dysfunction     liver bx 10/09,mild steaotosis    Osteoarthritis of cervical spine 6/7/2016    Ovarian cyst     Papanicolaou smear 09/14/2016    Dr. Mary Grider    PVCs (premature ventricular contractions)     symptomatic frequent PVCs, sees Dr. Tiffany Serna    Ventricular tachycardia 8/1/2017    Exercise induced 8/1/17     Past Surgical History:   Procedure Laterality Date    APPENDECTOMY      BREAST BIOPSY      CATARACT REMOVAL Bilateral     COLONOSCOPY  12/3/13,2-28-17 2.28.17 no need for follow up due to pt's advanced age per Dr. Lucas Ramirez. DIAGNOSTIC CARDIAC CATH LAB PROCEDURE  08/03/2017    Normal coronaries, EF 70%.     HYSTERECTOMY (CERVIX STATUS UNKNOWN)  1961    LIVER BIOPSY  10/09    steasosis    UPPER GASTROINTESTINAL ENDOSCOPY  11/02/2017    Dr. Dewayne Villegas History     Tobacco Use    Smoking status: Never    Smokeless tobacco: Never   Substance Use Topics    Alcohol use:  Yes     Alcohol/week: 0.0 standard drinks     Comment:  1 beers per day or 2 glasses of \"bubbly\"/day       LAB REVIEW:  CBC:   Lab Results   Component Value Date/Time    WBC 8.5 03/16/2022 08:40 AM    HGB 13.7 03/16/2022 08:40 AM    HCT 41.7 03/16/2022 08:40 AM     03/16/2022 08:40 AM     Lipids:   Lab Results   Component Value Date    HDL 65 11/16/2022    LDLCALC 48 11/16/2022    LDLDIRECT 64 12/04/2020    TRIGLYCFAST 52 11/16/2022    CHOLFAST 123 11/16/2022     Renal:   Lab Results   Component Value Date/Time    BUN 26 11/16/2022 07:30 AM    CREATININE 0.8 11/16/2022 07:30 AM     11/16/2022 07:30 AM    K 4.6 11/16/2022 07:30 AM    ALT 26 11/16/2022 07:30 AM    AST 32 11/16/2022 07:30 AM    GLUCOSE 105 11/16/2022 07:30 AM    GLUF 105 03/16/2022 08:40 AM     PT/INR:   Lab Results   Component Value Date/Time    INR 0.91 10/17/2017 10:51 AM     A1C:   Lab Results   Component Value Date    LABA1C 5.5 11/13/2019           Alphonse Stewart MD, 1/11/2023 , 10:07 AM

## 2023-01-11 NOTE — TELEPHONE ENCOUNTER
Patient contacted office and request that I leave a message for David Gunn about the MRI - patient stated she called and verified that the face part is open and not closed. She scheduled her appointment.

## 2023-01-26 ENCOUNTER — TELEPHONE (OUTPATIENT)
Dept: CARDIOLOGY CLINIC | Age: 83
End: 2023-01-26

## 2023-01-30 ENCOUNTER — TELEPHONE (OUTPATIENT)
Dept: CARDIOLOGY CLINIC | Age: 83
End: 2023-01-30

## 2023-01-30 NOTE — TELEPHONE ENCOUNTER
Patient has called a few times to get a appt with Destiny Hunter 1 yr check up , she is upset no one has called her back

## 2023-02-10 ENCOUNTER — OFFICE VISIT (OUTPATIENT)
Dept: INTERNAL MEDICINE CLINIC | Age: 83
End: 2023-02-10
Payer: MEDICARE

## 2023-02-10 VITALS
HEART RATE: 56 BPM | OXYGEN SATURATION: 96 % | SYSTOLIC BLOOD PRESSURE: 120 MMHG | DIASTOLIC BLOOD PRESSURE: 68 MMHG | RESPIRATION RATE: 12 BRPM | TEMPERATURE: 97.4 F | WEIGHT: 133 LBS | BODY MASS INDEX: 23.56 KG/M2

## 2023-02-10 DIAGNOSIS — N83.201 CYST OF RIGHT OVARY: ICD-10-CM

## 2023-02-10 DIAGNOSIS — R10.32 ABDOMINAL PAIN, LLQ (LEFT LOWER QUADRANT): Primary | ICD-10-CM

## 2023-02-10 PROCEDURE — G8427 DOCREV CUR MEDS BY ELIG CLIN: HCPCS | Performed by: INTERNAL MEDICINE

## 2023-02-10 PROCEDURE — 1090F PRES/ABSN URINE INCON ASSESS: CPT | Performed by: INTERNAL MEDICINE

## 2023-02-10 PROCEDURE — 1123F ACP DISCUSS/DSCN MKR DOCD: CPT | Performed by: INTERNAL MEDICINE

## 2023-02-10 PROCEDURE — 1036F TOBACCO NON-USER: CPT | Performed by: INTERNAL MEDICINE

## 2023-02-10 PROCEDURE — 99213 OFFICE O/P EST LOW 20 MIN: CPT | Performed by: INTERNAL MEDICINE

## 2023-02-10 PROCEDURE — 3074F SYST BP LT 130 MM HG: CPT | Performed by: INTERNAL MEDICINE

## 2023-02-10 PROCEDURE — 3078F DIAST BP <80 MM HG: CPT | Performed by: INTERNAL MEDICINE

## 2023-02-10 PROCEDURE — G8399 PT W/DXA RESULTS DOCUMENT: HCPCS | Performed by: INTERNAL MEDICINE

## 2023-02-10 PROCEDURE — G8484 FLU IMMUNIZE NO ADMIN: HCPCS | Performed by: INTERNAL MEDICINE

## 2023-02-10 PROCEDURE — G8420 CALC BMI NORM PARAMETERS: HCPCS | Performed by: INTERNAL MEDICINE

## 2023-02-10 ASSESSMENT — PATIENT HEALTH QUESTIONNAIRE - PHQ9
SUM OF ALL RESPONSES TO PHQ QUESTIONS 1-9: 0
2. FEELING DOWN, DEPRESSED OR HOPELESS: 0
SUM OF ALL RESPONSES TO PHQ QUESTIONS 1-9: 0
1. LITTLE INTEREST OR PLEASURE IN DOING THINGS: 0
SUM OF ALL RESPONSES TO PHQ9 QUESTIONS 1 & 2: 0

## 2023-02-10 NOTE — PROGRESS NOTES
Garett Michael  Patient's  is 1940  Seen in office on 2/10/2023      SUBJECTIVE:  Soha Scott keena 78 y. o.year old female presents today   Chief Complaint   Patient presents with    1 Month Follow-Up     Hip pain right, getting better    Abdominal Pain     Gnawing pain in mid abd and radiates to left side of abd        Right hip pain has improved. Patient is able to ambulate without problem. MRI of lumbar spine showed mild DJD and slight bulging disc. Patient is complaining of left lower quadrant abdominal pain. Dull pain constant  Patient had a CT scan of the abdomen done on 2021 which showed no acute abnormality but there was right ovarian cyst 3.3 x 2.9 cm. Patient states she has seen gynecologist and in office ultrasound showed no acute problems  UGI and SBFT : esophageal dysmotility, GERD / gastritis     Patient denies any rectal bleeding. No diarrhea. Taking medications regularly. No side effects noted. Review of Systems    OBJECTIVE: /68   Pulse 56   Temp 97.4 °F (36.3 °C) (Temporal)   Resp 12   Wt 133 lb (60.3 kg)   SpO2 96%   BMI 23.56 kg/m²     Wt Readings from Last 3 Encounters:   02/10/23 133 lb (60.3 kg)   23 136 lb (61.7 kg)   22 136 lb (61.7 kg)      GENERAL: - Alert, oriented, pleasant, in no apparent distress. HEENT: - Conjunctiva pink, no scleral icterus. ENT clear. NECK: -Supple. No jugular venous distention noted. No masses felt,  CARDIOVASCULAR: - Normal S1 and S2    PULMONARY: - No respiratory distress. No wheezes or rales. ABDOMEN: - Soft and non-tender,no masses  ororganomegaly. Tenderness of the left side of abdomen : LLQ  EXTREMITIES: - No cyanosis, clubbing, or significant edema. SKIN: Skin is warm and dry. NEUROLOGICAL: - Cranial nerves II through XII are grossly intact. IMPRESSION:    Encounter Diagnoses   Name Primary? Abdominal pain, LLQ (left lower quadrant) Yes    Cyst of right ovary        ASSESSMENT/PLAN:    1.  Abdominal pain, LLQ (left lower quadrant)   There is mild tenderness in the left lower quadrant   We will get CT scan of the abdomen and pelvis   Patient to call back for the results   If the pain is not resolved call. Patient states she has seen Zoe Moore recently and he did not recommend any colonoscopy  -     CT ABDOMEN PELVIS WO CONTRAST Additional Contrast? None; Future  2. Cyst of right ovary  Overview:  Seen Dr Concha Priest and had several US per pt. Last CT 4/2021 : showed slight increase in ovarian cyst. Referred to Dr Zoe Taylor and he did US in his office and was told it was not serious . Orders Placed This Encounter   Procedures    CT ABDOMEN PELVIS WO CONTRAST Additional Contrast? None              Mediations reviewed with the patient. Continue current medications. Appropriate prescriptions are addressed. After visit summeryprovided. Follow up as directed sooner if needed. Questions answered and patient verbalizes understanding. Call for any problems, questions, or concerns. Allergies   Allergen Reactions    Amiodarone      Severe hair loss    Amoxicillin Other (See Comments)     Pt states she got C-diff. , so she doesn't like atb     Current Outpatient Medications   Medication Sig Dispense Refill    atorvastatin (LIPITOR) 10 MG tablet TAKE 1 TABLET EVERY DAY 90 tablet 1    levothyroxine (SYNTHROID) 50 MCG tablet TAKE 1 TABLET BY MOUTH DAILY 90 tablet 1    benazepril (LOTENSIN) 10 MG tablet TAKE 1 TABLET BY MOUTH EVERY DAY 90 tablet 1    docusate sodium (COLACE) 100 MG capsule Take 1 capsule by mouth daily as needed for Constipation 30 capsule 2    Magnesium 125 MG CAPS Take 250 mg by mouth daily 90 capsule 1    lactobacillus (CULTURELLE) capsule Take 1 capsule by mouth 2 times daily (with meals) 30 capsule 0    Coenzyme Q10 (CO Q 10) 100 MG CAPS Take by mouth daily       Cholecalciferol (VITAMIN D3) 2000 UNITS CAPS Take 1 capsule by mouth daily       Multiple Vitamins-Minerals (MULTIVITAMIN & MINERAL PO) Take 1 tablet by mouth daily      calcium carbonate (OSCAL) 500 MG TABS tablet Take 500 mg by mouth daily      aspirin 81 MG tablet Take 81 mg by mouth daily       No current facility-administered medications for this visit. Past Medical History:   Diagnosis Date    Acute deep vein thrombosis (DVT) of distal vein of left lower extremity ([address] 10/23/2016    DVT involving left peroneal, posterior and anterior tibial veins 10/16 Treated for at least 3 months and repeat venous doppler was negative and was discontinued     Axillary adenopathy 8/28/2014    US of axilla 6/14 showed benign lymph nodes    C. difficile diarrhea 11/18/2016    Treated few times and now is normal    CAD (coronary artery disease)     Chest discomfort 8/1/2017    Colitis 12/13    EGD,COLONOSCOPY, SBFT normal. Dr. Renee Hashimoto    Cyst of right ovary 9/6/2016    Seen Dr Gerda Umanzor and had several US per pt. Dizzy spells 6/5/2017    H/O 24 hour EKG monitoring 7/27/09    NSR, few PVCs and occasional couplet noted (total 1007 PVCs), no pauses noted. H/O colonoscopy 10/09, 12/13    f/u in 5 years    H/O echocardiogram 9/17/12    Normal LV size and function, mild mitral and tricuspid regurg., EF 60%. H/O mitral valve prolapse 2003    MR mild    H/O myocardial perfusion scan 12/16/14    Stress Cardiolite. Normal perfusion in the distribution of all coronaries, normal LV size and function, EF 70%. H/O screening mammography 8/11    Dr Leona Castillo    History of cardiac monitoring 06/05/2017    Abnormal-SR with complex ventricular arrhythmias and frequent PVCs. No symptoms during marked bradycardia or during the fastest rate of 143 with nonsustained three-beat runs of ventricular tachycardia. History of cardiac monitoring 08/23/2017    Abnormal event monitor findings suggestive of predominantly sinus bradycardia with isolated and frequent low-grade ventricular ectopy with symptoms reported.      Hx of Doppler ultrasound 10/17/2017    duplex venous doppler-minimal reflux noted in the left CFV    Hyperlipidemia     Hypertension     Hypothyroidism     Liver dysfunction     liver bx 10/09,mild steaotosis    Osteoarthritis of cervical spine 6/7/2016    Ovarian cyst     Papanicolaou smear 09/14/2016    Dr. Ilda Leonard    PVCs (premature ventricular contractions)     symptomatic frequent PVCs, sees Dr. Elaine Brothers    Ventricular tachycardia 8/1/2017    Exercise induced 8/1/17     Past Surgical History:   Procedure Laterality Date    APPENDECTOMY      BREAST BIOPSY      CATARACT REMOVAL Bilateral     COLONOSCOPY  12/3/13,2-28-17 2.28.17 no need for follow up due to pt's advanced age per Dr. Yoselin Sanders. DIAGNOSTIC CARDIAC CATH LAB PROCEDURE  08/03/2017    Normal coronaries, EF 70%. HYSTERECTOMY (CERVIX STATUS UNKNOWN)  1961    LIVER BIOPSY  10/09    steasosis    UPPER GASTROINTESTINAL ENDOSCOPY  11/02/2017    Dr. Yoselin Sanders     Social History     Tobacco Use    Smoking status: Never    Smokeless tobacco: Never   Substance Use Topics    Alcohol use:  Yes     Alcohol/week: 0.0 standard drinks     Comment:  1 beers per day or 2 glasses of \"bubbly\"/day       LAB REVIEW:  CBC:   Lab Results   Component Value Date/Time    WBC 8.5 03/16/2022 08:40 AM    HGB 13.7 03/16/2022 08:40 AM    HCT 41.7 03/16/2022 08:40 AM     03/16/2022 08:40 AM     Lipids:   Lab Results   Component Value Date    HDL 65 11/16/2022    LDLCALC 48 11/16/2022    LDLDIRECT 64 12/04/2020    TRIGLYCFAST 52 11/16/2022    CHOLFAST 123 11/16/2022     Renal:   Lab Results   Component Value Date/Time    BUN 26 11/16/2022 07:30 AM    CREATININE 0.8 11/16/2022 07:30 AM     11/16/2022 07:30 AM    K 4.6 11/16/2022 07:30 AM    ALT 26 11/16/2022 07:30 AM    AST 32 11/16/2022 07:30 AM    GLUCOSE 105 11/16/2022 07:30 AM    GLUF 105 03/16/2022 08:40 AM     PT/INR:   Lab Results   Component Value Date/Time    INR 0.91 10/17/2017 10:51 AM     A1C:   Lab Results   Component Value Date    LABA1C 5.5 11/13/2019           Natalio Stephen Tyson Estrada MD, 2/10/2023 , 10:03 AM

## 2023-04-07 ENCOUNTER — OFFICE VISIT (OUTPATIENT)
Dept: CARDIOLOGY CLINIC | Age: 83
End: 2023-04-07
Payer: MEDICARE

## 2023-04-07 VITALS
HEIGHT: 63 IN | DIASTOLIC BLOOD PRESSURE: 70 MMHG | WEIGHT: 136.2 LBS | HEART RATE: 61 BPM | BODY MASS INDEX: 24.13 KG/M2 | SYSTOLIC BLOOD PRESSURE: 124 MMHG | OXYGEN SATURATION: 97 %

## 2023-04-07 DIAGNOSIS — I49.3 PVCS (PREMATURE VENTRICULAR CONTRACTIONS): Primary | ICD-10-CM

## 2023-04-07 DIAGNOSIS — R06.02 SOB (SHORTNESS OF BREATH): ICD-10-CM

## 2023-04-07 PROCEDURE — 3078F DIAST BP <80 MM HG: CPT | Performed by: INTERNAL MEDICINE

## 2023-04-07 PROCEDURE — G8420 CALC BMI NORM PARAMETERS: HCPCS | Performed by: INTERNAL MEDICINE

## 2023-04-07 PROCEDURE — 99213 OFFICE O/P EST LOW 20 MIN: CPT | Performed by: INTERNAL MEDICINE

## 2023-04-07 PROCEDURE — G8399 PT W/DXA RESULTS DOCUMENT: HCPCS | Performed by: INTERNAL MEDICINE

## 2023-04-07 PROCEDURE — 1036F TOBACCO NON-USER: CPT | Performed by: INTERNAL MEDICINE

## 2023-04-07 PROCEDURE — 3074F SYST BP LT 130 MM HG: CPT | Performed by: INTERNAL MEDICINE

## 2023-04-07 PROCEDURE — 1123F ACP DISCUSS/DSCN MKR DOCD: CPT | Performed by: INTERNAL MEDICINE

## 2023-04-07 PROCEDURE — G8427 DOCREV CUR MEDS BY ELIG CLIN: HCPCS | Performed by: INTERNAL MEDICINE

## 2023-04-07 PROCEDURE — 1090F PRES/ABSN URINE INCON ASSESS: CPT | Performed by: INTERNAL MEDICINE

## 2023-04-07 PROCEDURE — 93000 ELECTROCARDIOGRAM COMPLETE: CPT | Performed by: INTERNAL MEDICINE

## 2023-04-10 NOTE — ED PROVIDER NOTES
Triage Chief Complaint:   Dysuria (Pt arrives ambulatory stating she is unable to urinate since 100 pm she has not been able to urinate. Pt states she drank alot of fluids and a beer but still can't urinate  Pt states this happened before and her electrolyte was abnormal)    [place]:  Richard De Los Santos is a 78 y.o. female that presents to the ED with inability to void since 1:00 today she feels like she needs to drink a lot of fluid in fact even drink a beer. She is just very concerned patient is in the ED by her self she admits to having a hysterectomy no other abdominal surgeries denies any dysuria on my questioning no urgency frequency back or flank discomfort. Patient has numerous health problems that are obtained reviewed on the nurse's flowsheet past medical history as noted. She has no other acute high risk features that I can detect. Past Medical History:   Diagnosis Date    Acute deep vein thrombosis (DVT) of distal vein of left lower extremity ([address] 10/23/2016    DVT involving left peroneal, posterior and anterior tibial veins 10/16 Treated for at least 3 months and repeat venous doppler was negative and was discontinued     Axillary adenopathy 8/28/2014    US of axilla 6/14 showed benign lymph nodes    C. difficile diarrhea 11/18/2016    Treated few times and now is normal    CAD (coronary artery disease)     Chest discomfort 8/1/2017    Colitis 12/13    EGD,COLONOSCOPY, SBFT normal. Dr. Margi Ascencio    Cyst of right ovary 9/6/2016    Seen Dr Sridevi Duarte and had several US per pt.  Dizzy spells 6/5/2017    H/O 24 hour EKG monitoring 7/27/09    NSR, few PVCs and occasional couplet noted (total 1007 PVCs), no pauses noted.  H/O colonoscopy 10/09, 12/13    f/u in 5 years    H/O echocardiogram 9/17/12    Normal LV size and function, mild mitral and tricuspid regurg., EF 60%.  H/O mitral valve prolapse 2003    MR mild    H/O myocardial perfusion scan 12/16/14    Stress Cardiolite.   Normal perfusion in the distribution of all coronaries, normal LV size and function, EF 70%.  H/O screening mammography 8/11    Dr Leticia Holguin History of cardiac monitoring 06/05/2017    Abnormal-SR with complex ventricular arrhythmias and frequent PVCs. No symptoms during marked bradycardia or during the fastest rate of 143 with nonsustained three-beat runs of ventricular tachycardia.  History of cardiac monitoring 08/23/2017    Abnormal event monitor findings suggestive of predominantly sinus bradycardia with isolated and frequent low-grade ventricular ectopy with symptoms reported.  Hx of Doppler ultrasound 10/17/2017    duplex venous doppler-minimal reflux noted in the left CFV    Hyperlipidemia     Hypertension     Hypothyroidism     Liver dysfunction     liver bx 10/09,mild steaotosis    Osteoarthritis of cervical spine 6/7/2016    Ovarian cyst     Papanicolaou smear 09/14/2016    Dr. Leticia Holguin PVCs (premature ventricular contractions)     symptomatic frequent PVCs, sees Dr. Egan  Ventricular tachycardia ([facility] [address] 8/1/2017    Exercise induced 8/1/17     Past Surgical History:   Procedure Laterality Date    APPENDECTOMY      BREAST BIOPSY      CATARACT REMOVAL Bilateral     COLONOSCOPY  12/3/13,2-28-17    2.28.17 no need for follow up due to pt's advanced age per Dr. Cj Parr.  DIAGNOSTIC CARDIAC CATH LAB PROCEDURE  08/03/2017    Normal coronaries, EF 70%.     HYSTERECTOMY  1961    LIVER BIOPSY  10/09    steasosis    UPPER GASTROINTESTINAL ENDOSCOPY  11/02/2017    Dr. Cj Parr     Family History   Problem Relation Age of Onset    Heart Surgery Mother         CABG    Rheum Arthritis Mother     Osteoarthritis Mother     Hypertension Mother     High Cholesterol Mother     Migraines Mother     Stroke Mother     Heart Disease Mother     Heart Disease Brother     Heart Failure Maternal Grandmother      Social History     Socioeconomic History    Marital status:      Spouse name: Not on file    Number of children: Not on file    Years of education: Not on file    Highest education level: Not on file   Occupational History    Not on file   Social Needs    Financial resource strain: Not on file    Food insecurity     Worry: Not on file     Inability: Not on file    Transportation needs     Medical: Not on file     Non-medical: Not on file   Tobacco Use    Smoking status: Never Smoker    Smokeless tobacco: Never Used   Substance and Sexual Activity    Alcohol use: Yes     Alcohol/week: 0.0 standard drinks     Comment:  1 beers per day or 2 glasses of \"bubbly\"/day    Drug use: No    Sexual activity: Yes     Partners: Male   Lifestyle    Physical activity     Days per week: Not on file     Minutes per session: Not on file    Stress: Not on file   Relationships    Social connections     Talks on phone: Not on file     Gets together: Not on file     Attends L[employer] service: Not on file     Active member of club or organization: Not on file     Attends meetings of clubs or organizations: Not on file     Relationship status: Not on file    Intimate partner violence     Fear of current or ex partner: Not on file     Emotionally abused: Not on file     Physically abused: Not on file     Forced sexual activity: Not on file   Other Topics Concern    Not on file   Social History Narrative    Not on file     No current facility-administered medications for this encounter.       Current Outpatient Medications   Medication Sig Dispense Refill    nitrofurantoin, macrocrystal-monohydrate, (MACROBID) 100 MG capsule Take 1 capsule by mouth 2 times daily for 10 days 20 capsule 0    levothyroxine (SYNTHROID) 50 MCG tablet TAKE 1 TABLET BY MOUTH DAILY 90 tablet 1    atorvastatin (LIPITOR) 10 MG tablet TAKE 1 TABLET EVERY DAY 90 tablet 1    benazepril (LOTENSIN) 20 MG tablet TAKE 1 TABLET BY MOUTH DAILY 90 tablet 1    zoster recombinant adjuvanted vaccine (SHINGRIX) 50 MCG/0.5ML SUSR injection 50 MCG IM then repeat NEGATIVE NEGATIVE MG/DL    Specific Gravity, UA 1.005 1.001 - 1.035    Blood, Urine SMALL NEGATIVE    pH, Urine 6.5 5.0 - 8.0    Protein, UA NEGATIVE NEGATIVE MG/DL    Urobilinogen, Urine 0.2 0.2 - 1.0 MG/DL    Nitrite Urine, Quantitative POSITIVE (A) NEGATIVE    Leukocyte Esterase, Urine LARGE NEGATIVE    Volume, (UVOL) 12 10 - 12 ML    RBC, UA NO CELLS SEEN 0 - 6 /HPF    WBC, UA 0 TO 3 0 - 5 /HPF    Epithelial Cells, UA 0 TO 3 /HPF    Cast Type NO CAST FORMS SEEN NO CAST FORMS SEEN /HPF    Bacteria, UA OCCASIONAL (A) NEGATIVE /HPF    Crystal Type MODERATE AMORPHOUS NEGATIVE /HPF    Mucus, UA NEGATIVE NEGATIVE HPF   Basic Metabolic Panel w/ Reflex to MG   Result Value Ref Range    Sodium 125 (L) 135 - 145 MMOL/L    Potassium 3.7 3.5 - 5.1 MMOL/L    Chloride 91 (L) 99 - 110 mMol/L    CO2 18 (L) 21 - 32 MMOL/L    BUN 23 6 - 23 MG/DL    CREATININE 1.0 0.6 - 1.1 MG/DL    Glucose 95 70 - 99 MG/DL    Calcium 9.2 8.3 - 10.6 MG/DL    GFR Non- 53 (L) >60 mL/min/1.73m2    GFR African American >60 >60 mL/min/1.73m2      Radiographs (if obtained):  [] The following radiograph wasinterpreted by myself in the absence of a radiologist:   [] Radiologist's Report Reviewed:  No orders to display         EKG (if obtained): (All EKG's are interpreted by myself in the absence of a cardiologist)    Chart review shows recent radiographs:  No results found. MDM:        Patient presents to the ED with a feeling that she has not voided since this afternoon. The patient actually is well-appearing nontoxic. Abdomen is soft urinary bladder is not distended. I did a point-of-care ultrasound was unremarkable. The patient will be signed out to the night doctor to reviewed the basic metabolic panel and urinalysis. Point-of-care ultrasound reveals no hydronephrosis bilaterally. Urinary bladder is small not grossly distended. Clinical Impression:  1. Voiding dysfunction    2.  Lower urinary tract infection 3. Hyponatremia      Disposition referral (if applicable):  Willis Andres MD  [address]  [phone]    Schedule an appointment as soon as possible for a visit       [facility]  [address]  [phone]  Go to   As needed, If symptoms worsen    Disposition medications (if applicable):  Discharge Medication List as of 4/26/2021  8:24 PM      START taking these medications    Details   nitrofurantoin, macrocrystal-monohydrate, (MACROBID) 100 MG capsule Take 1 capsule by mouth 2 times daily for 10 days, Disp-20 capsule, R-0Normal                 Ramez Ogden DO, FACEP      Comment: Please note this report has been produced using speech recognition software and maycontain errors related to that system including errors in grammar, punctuation, and spelling, as well as words and phrases that may be inappropriate. If there are any questions or concerns please feel free to contact thedictating provider for clarification.         Dawson Pearce DO  04/27/21 [id number]

## 2023-04-21 ENCOUNTER — OFFICE VISIT (OUTPATIENT)
Dept: CARDIOLOGY CLINIC | Age: 83
End: 2023-04-21

## 2023-04-21 VITALS
BODY MASS INDEX: 24.2 KG/M2 | WEIGHT: 136.6 LBS | HEART RATE: 71 BPM | OXYGEN SATURATION: 96 % | DIASTOLIC BLOOD PRESSURE: 84 MMHG | HEIGHT: 63 IN | SYSTOLIC BLOOD PRESSURE: 136 MMHG

## 2023-04-21 DIAGNOSIS — I49.3 PVCS (PREMATURE VENTRICULAR CONTRACTIONS): Primary | ICD-10-CM

## 2023-04-21 DIAGNOSIS — R00.2 HEART PALPITATIONS: ICD-10-CM

## 2023-05-10 ENCOUNTER — OFFICE VISIT (OUTPATIENT)
Dept: INTERNAL MEDICINE CLINIC | Age: 83
End: 2023-05-10
Payer: MEDICARE

## 2023-05-10 VITALS
OXYGEN SATURATION: 97 % | SYSTOLIC BLOOD PRESSURE: 120 MMHG | HEART RATE: 73 BPM | DIASTOLIC BLOOD PRESSURE: 70 MMHG | BODY MASS INDEX: 23.85 KG/M2 | HEIGHT: 63 IN | WEIGHT: 134.6 LBS

## 2023-05-10 DIAGNOSIS — M81.0 AGE-RELATED OSTEOPOROSIS WITHOUT CURRENT PATHOLOGICAL FRACTURE: ICD-10-CM

## 2023-05-10 DIAGNOSIS — E78.2 MIXED HYPERLIPIDEMIA: ICD-10-CM

## 2023-05-10 DIAGNOSIS — I10 ESSENTIAL HYPERTENSION: Primary | ICD-10-CM

## 2023-05-10 DIAGNOSIS — M54.41 ACUTE RIGHT-SIDED LOW BACK PAIN WITH RIGHT-SIDED SCIATICA: ICD-10-CM

## 2023-05-10 DIAGNOSIS — I49.3 PVCS (PREMATURE VENTRICULAR CONTRACTIONS): ICD-10-CM

## 2023-05-10 DIAGNOSIS — K21.9 GASTROESOPHAGEAL REFLUX DISEASE WITHOUT ESOPHAGITIS: ICD-10-CM

## 2023-05-10 DIAGNOSIS — E03.9 ACQUIRED HYPOTHYROIDISM: ICD-10-CM

## 2023-05-10 DIAGNOSIS — N83.201 CYST OF RIGHT OVARY: ICD-10-CM

## 2023-05-10 PROCEDURE — G8420 CALC BMI NORM PARAMETERS: HCPCS | Performed by: INTERNAL MEDICINE

## 2023-05-10 PROCEDURE — 99214 OFFICE O/P EST MOD 30 MIN: CPT | Performed by: INTERNAL MEDICINE

## 2023-05-10 PROCEDURE — G8399 PT W/DXA RESULTS DOCUMENT: HCPCS | Performed by: INTERNAL MEDICINE

## 2023-05-10 PROCEDURE — 3074F SYST BP LT 130 MM HG: CPT | Performed by: INTERNAL MEDICINE

## 2023-05-10 PROCEDURE — 1123F ACP DISCUSS/DSCN MKR DOCD: CPT | Performed by: INTERNAL MEDICINE

## 2023-05-10 PROCEDURE — 3078F DIAST BP <80 MM HG: CPT | Performed by: INTERNAL MEDICINE

## 2023-05-10 PROCEDURE — 1090F PRES/ABSN URINE INCON ASSESS: CPT | Performed by: INTERNAL MEDICINE

## 2023-05-10 PROCEDURE — G8427 DOCREV CUR MEDS BY ELIG CLIN: HCPCS | Performed by: INTERNAL MEDICINE

## 2023-05-10 PROCEDURE — 1036F TOBACCO NON-USER: CPT | Performed by: INTERNAL MEDICINE

## 2023-05-10 RX ORDER — ATORVASTATIN CALCIUM 10 MG/1
TABLET, FILM COATED ORAL
Qty: 90 TABLET | Refills: 1 | Status: SHIPPED | OUTPATIENT
Start: 2023-05-10

## 2023-05-10 RX ORDER — BENAZEPRIL HYDROCHLORIDE 10 MG/1
TABLET ORAL
Qty: 90 TABLET | Refills: 1 | Status: SHIPPED | OUTPATIENT
Start: 2023-05-10

## 2023-05-10 RX ORDER — LEVOTHYROXINE SODIUM 0.05 MG/1
TABLET ORAL
Qty: 90 TABLET | Refills: 1 | Status: SHIPPED | OUTPATIENT
Start: 2023-05-10

## 2023-05-10 RX ORDER — DOCUSATE SODIUM 100 MG/1
100 CAPSULE, LIQUID FILLED ORAL DAILY PRN
Qty: 30 CAPSULE | Refills: 2 | Status: SHIPPED | OUTPATIENT
Start: 2023-05-10

## 2023-05-10 SDOH — ECONOMIC STABILITY: INCOME INSECURITY: HOW HARD IS IT FOR YOU TO PAY FOR THE VERY BASICS LIKE FOOD, HOUSING, MEDICAL CARE, AND HEATING?: NOT VERY HARD

## 2023-05-10 SDOH — ECONOMIC STABILITY: HOUSING INSECURITY
IN THE LAST 12 MONTHS, WAS THERE A TIME WHEN YOU DID NOT HAVE A STEADY PLACE TO SLEEP OR SLEPT IN A SHELTER (INCLUDING NOW)?: NO

## 2023-05-10 SDOH — ECONOMIC STABILITY: FOOD INSECURITY: WITHIN THE PAST 12 MONTHS, YOU WORRIED THAT YOUR FOOD WOULD RUN OUT BEFORE YOU GOT MONEY TO BUY MORE.: NEVER TRUE

## 2023-05-10 SDOH — ECONOMIC STABILITY: FOOD INSECURITY: WITHIN THE PAST 12 MONTHS, THE FOOD YOU BOUGHT JUST DIDN'T LAST AND YOU DIDN'T HAVE MONEY TO GET MORE.: NEVER TRUE

## 2023-05-10 NOTE — PROGRESS NOTES
Karly Bermudez  Patient's  is 1940  Seen in office on 5/10/2023      SUBJECTIVE:  Dixie Juárez keena 78 y. o.year old female presents today   Chief Complaint   Patient presents with    Follow-up       Patient is here for follow-up of hypertension, hyperlipidemia, hypothyroidism and IBS  Patient states she had melanoma in situ left arm excised by Dr Kamala Rolle   F/u every year     Seen Dr Diane Whittington :  has PVCs and refused ablation or med  Doing well. Echo : EF 55-60 %    CT abdomen reviewed again  Dr Bri Lopez has seen her for adenexal mass : had US also done per pt and was told nothing to worry    Has small cyst like swelling on lef tside of abd wall. Pt to show to Dr Kamala Rolle. No pain    Lowr back pain has resolved  Patient has hypertension. Taking medications No headaches, no chest pain, no palpitations and no dizziness. Patient has hyperlipidemia. Taking medications. No abdominal pain, no nausea or vomiting. No myalgias. Patient has hypothyroidism and takes levothyroxine. Taking medications regularly. No side effects noted. Review of Systems  Review of system is normal except as in HPI    OBJECTIVE: /70 (Site: Right Upper Arm, Position: Sitting, Cuff Size: Medium Adult)   Pulse 73   Ht 5' 3\" (1.6 m)   Wt 134 lb 9.6 oz (61.1 kg)   SpO2 97%   BMI 23.84 kg/m²     Wt Readings from Last 3 Encounters:   05/10/23 134 lb 9.6 oz (61.1 kg)   23 136 lb 9.6 oz (62 kg)   23 136 lb 3.2 oz (61.8 kg)      GENERAL: - Alert, oriented, pleasant, in no apparent distress. HEENT: - Conjunctiva pink, no scleral icterus. ENT clear. NECK: -Supple. No jugular venous distention noted. No masses felt,  CARDIOVASCULAR: - Normal S1 and S2    PULMONARY: - No respiratory distress. No wheezes or rales. ABDOMEN: - Soft and non-tender,no masses  ororganomegaly. EXTREMITIES: - No cyanosis, clubbing, or significant edema. SKIN: Skin is warm and dry. NEUROLOGICAL: - Cranial nerves II through XII are grossly intact.

## 2023-05-18 PROBLEM — D03.62 MELANOMA IN SITU OF LEFT UPPER EXTREMITY INCLUDING SHOULDER (HCC): Status: ACTIVE | Noted: 2023-05-18

## 2023-06-09 ENCOUNTER — TELEMEDICINE (OUTPATIENT)
Dept: INTERNAL MEDICINE CLINIC | Age: 83
End: 2023-06-09
Payer: MEDICARE

## 2023-06-09 DIAGNOSIS — Z00.00 MEDICARE ANNUAL WELLNESS VISIT, SUBSEQUENT: Primary | ICD-10-CM

## 2023-06-09 PROCEDURE — G0439 PPPS, SUBSEQ VISIT: HCPCS | Performed by: INTERNAL MEDICINE

## 2023-06-09 PROCEDURE — 1123F ACP DISCUSS/DSCN MKR DOCD: CPT | Performed by: INTERNAL MEDICINE

## 2023-06-09 ASSESSMENT — PATIENT HEALTH QUESTIONNAIRE - PHQ9
SUM OF ALL RESPONSES TO PHQ9 QUESTIONS 1 & 2: 1
1. LITTLE INTEREST OR PLEASURE IN DOING THINGS: 0
SUM OF ALL RESPONSES TO PHQ QUESTIONS 1-9: 1
2. FEELING DOWN, DEPRESSED OR HOPELESS: 1

## 2023-06-09 ASSESSMENT — LIFESTYLE VARIABLES: HOW MANY STANDARD DRINKS CONTAINING ALCOHOL DO YOU HAVE ON A TYPICAL DAY: 1 OR 2

## 2023-06-09 NOTE — PROGRESS NOTES
Marlene Burger Yes Alphonse Stewart MD   benazepril (LOTENSIN) 10 MG tablet TAKE 1 TABLET BY MOUTH EVERY DAY Yes Alphonse Stewart MD   levothyroxine (SYNTHROID) 50 MCG tablet TAKE 1 TABLET BY MOUTH DAILY Yes Alphonse Stewart MD   docusate sodium (COLACE) 100 MG capsule Take 1 capsule by mouth daily as needed for Constipation Yes Alphonse Stewart MD   Magnesium 125 MG CAPS Take 250 mg by mouth daily Yes Alphonse Stewart MD   aspirin 81 MG tablet Take 1 tablet by mouth daily Every other day. Yes Historical Provider, MD   lactobacillus (CULTURELLE) capsule Take 1 capsule by mouth 2 times daily (with meals)  Patient taking differently: Take 1 capsule by mouth daily Yes Timur Thompson MD   Coenzyme Q10 (CO Q 10) 100 MG CAPS Take by mouth daily  Yes Historical Provider, MD   Cholecalciferol (VITAMIN D3) 2000 UNITS CAPS Take 1 capsule by mouth daily Yes Historical Provider, MD   Multiple Vitamins-Minerals (MULTIVITAMIN & MINERAL PO) Take 1 tablet by mouth daily Yes Historical Provider, MD   calcium carbonate (OSCAL) 500 MG TABS tablet Take 1 tablet by mouth daily Yes Historical Provider, MD [place] (Including outside providers/suppliers regularly involved in providing care):   Patient Care Team:  Alphonse Stewart MD as PCP - General (Internal Medicine)  Alphonse Stewart MD as PCP - Empaneled Provider  Caitlin Gao MD as Consulting Physician (Cardiology)  Lia Ferro MD as Consulting Physician (Gastroenterology)  Kennedy Carlson MD as Consulting Physician (Electrophysiology)     Reviewed and updated this visit:  Allergies  Meds  Med Hx  Surg Hx  Soc Hx  Fam Hx      I, Lisa Martinez LPN, 8/2/1227, performed the documented evaluation under the direct supervision of the attending physician. Sangnavneet Lockson, was evaluated through a synchronous (real-time) audio encounter. The patient (or guardian if applicable) is aware that this is a billable service, which includes applicable co-pays.  This Virtual Visit was conducted with

## 2023-06-09 NOTE — PATIENT INSTRUCTIONS
Personalized Preventive Plan for Jesika Hercules - 6/9/2023  Medicare offers a range of preventive health benefits. Some of the tests and screenings are paid in full while other may be subject to a deductible, co-insurance, and/or copay. Some of these benefits include a comprehensive review of your medical history including lifestyle, illnesses that may run in your family, and various assessments and screenings as appropriate. After reviewing your medical record and screening and assessments performed today your provider may have ordered immunizations, labs, imaging, and/or referrals for you. A list of these orders (if applicable) as well as your Preventive Care list are included within your After Visit Summary for your review. Other Preventive Recommendations:    A preventive eye exam performed by an eye specialist is recommended every 1-2 years to screen for glaucoma; cataracts, macular degeneration, and other eye disorders. A preventive dental visit is recommended every 6 months. Try to get at least 150 minutes of exercise per week or 10,000 steps per day on a pedometer . Order or download the FREE \"Exercise & Physical Activity: Your Everyday Guide\" from [employer]. Call 5-292.219.1659 or search [employer] online. You need 4975-0285 mg of calcium and 5017-1474 IU of vitamin D per day. It is possible to meet your calcium requirement with diet alone, but a vitamin D supplement is usually necessary to meet this goal.  When exposed to the sun, use a sunscreen that protects against both UVA and UVB radiation with an SPF of 30 or greater. Reapply every 2 to 3 hours or after sweating, drying off with a towel, or swimming. Always wear a seat belt when traveling in a car. Always wear a helmet when riding a bicycle or motorcycle.

## 2023-08-01 ENCOUNTER — HOSPITAL ENCOUNTER (EMERGENCY)
Age: 83
Discharge: HOME | End: 2023-08-02
Attending: EMERGENCY MEDICINE
Payer: MEDICARE

## 2023-08-01 DIAGNOSIS — M79.605 LEFT LEG PAIN: ICD-10-CM

## 2023-08-01 DIAGNOSIS — R79.89 ELEVATED D-DIMER: ICD-10-CM

## 2023-08-01 DIAGNOSIS — R10.84 GENERALIZED ABDOMINAL PAIN: Primary | ICD-10-CM

## 2023-08-01 LAB
ALBUMIN SERPL-MCNC: 3.9 GM/DL (ref 3.4–5)
ALP BLD-CCNC: 82 IU/L (ref 40–129)
ALT SERPL-CCNC: 24 U/L (ref 10–40)
ANION GAP SERPL CALCULATED.3IONS-SCNC: 8 MMOL/L (ref 4–16)
AST SERPL-CCNC: 45 IU/L (ref 15–37)
BASOPHILS ABSOLUTE: 0 K/CU MM
BASOPHILS RELATIVE PERCENT: 0.6 % (ref 0–1)
BILIRUB SERPL-MCNC: 0.3 MG/DL (ref 0–1)
BILIRUBIN URINE: NEGATIVE MG/DL
BLOOD, URINE: NEGATIVE
BUN SERPL-MCNC: 21 MG/DL (ref 6–23)
CALCIUM SERPL-MCNC: 9.1 MG/DL (ref 8.3–10.6)
CHLORIDE BLD-SCNC: 103 MMOL/L (ref 99–110)
CLARITY: CLEAR
CO2: 26 MMOL/L (ref 21–32)
COLOR: YELLOW
COMMENT UA: NORMAL
CREAT SERPL-MCNC: 0.9 MG/DL (ref 0.6–1.1)
D DIMER: 1.02 UG/ML (FEU)
DIFFERENTIAL TYPE: ABNORMAL
EOSINOPHILS ABSOLUTE: 0.2 K/CU MM
EOSINOPHILS RELATIVE PERCENT: 3.2 % (ref 0–3)
GFR SERPL CREATININE-BSD FRML MDRD: >60 ML/MIN/1.73M2
GLUCOSE SERPL-MCNC: 117 MG/DL (ref 70–99)
GLUCOSE, URINE: NEGATIVE MG/DL
HCT VFR BLD CALC: 38 % (ref 37–47)
HEMOGLOBIN: 12.6 GM/DL (ref 12.5–16)
IMMATURE NEUTROPHIL %: 0.2 % (ref 0–0.43)
KETONES, URINE: NEGATIVE MG/DL
LEUKOCYTE ESTERASE, URINE: NEGATIVE
LIPASE: 34 IU/L (ref 13–60)
LYMPHOCYTES ABSOLUTE: 2.6 K/CU MM
LYMPHOCYTES RELATIVE PERCENT: 38.9 % (ref 24–44)
MCH RBC QN AUTO: 30.2 PG (ref 27–31)
MCHC RBC AUTO-ENTMCNC: 33.2 % (ref 32–36)
MCV RBC AUTO: 91.1 FL (ref 78–100)
MONOCYTES ABSOLUTE: 0.6 K/CU MM
MONOCYTES RELATIVE PERCENT: 8.7 % (ref 0–4)
NITRITE URINE, QUANTITATIVE: NEGATIVE
PDW BLD-RTO: 13.1 % (ref 11.7–14.9)
PH, URINE: 6 (ref 5–8)
PLATELET # BLD: 203 K/CU MM (ref 140–440)
PMV BLD AUTO: 10.4 FL (ref 7.5–11.1)
POTASSIUM SERPL-SCNC: 3.9 MMOL/L (ref 3.5–5.1)
PROTEIN UA: NEGATIVE MG/DL
RBC # BLD: 4.17 M/CU MM (ref 4.2–5.4)
SEGMENTED NEUTROPHILS ABSOLUTE COUNT: 3.2 K/CU MM
SEGMENTED NEUTROPHILS RELATIVE PERCENT: 48.4 % (ref 36–66)
SODIUM BLD-SCNC: 137 MMOL/L (ref 135–145)
SPECIFIC GRAVITY UA: 1.01 (ref 1–1.03)
TOTAL IMMATURE NEUTOROPHIL: 0.01 K/CU MM
TOTAL PROTEIN: 6.4 GM/DL (ref 6.4–8.2)
UROBILINOGEN, URINE: 0.2 MG/DL (ref 0.2–1)
WBC # BLD: 6.7 K/CU MM (ref 4–10.5)

## 2023-08-01 PROCEDURE — 81003 URINALYSIS AUTO W/O SCOPE: CPT

## 2023-08-01 PROCEDURE — 85025 COMPLETE CBC W/AUTO DIFF WBC: CPT

## 2023-08-01 PROCEDURE — 83690 ASSAY OF LIPASE: CPT

## 2023-08-01 PROCEDURE — 99284 EMERGENCY DEPT VISIT MOD MDM: CPT

## 2023-08-01 PROCEDURE — 85379 FIBRIN DEGRADATION QUANT: CPT

## 2023-08-01 PROCEDURE — 80053 COMPREHEN METABOLIC PANEL: CPT

## 2023-08-01 ASSESSMENT — PAIN SCALES - GENERAL: PAINLEVEL_OUTOF10: 6

## 2023-08-01 ASSESSMENT — PAIN - FUNCTIONAL ASSESSMENT: PAIN_FUNCTIONAL_ASSESSMENT: 0-10

## 2023-08-02 ENCOUNTER — HOSPITAL ENCOUNTER (OUTPATIENT)
Dept: ULTRASOUND IMAGING | Age: 83
Discharge: HOME | End: 2023-08-02
Attending: EMERGENCY MEDICINE
Payer: MEDICARE

## 2023-08-02 VITALS
HEART RATE: 67 BPM | HEIGHT: 63 IN | BODY MASS INDEX: 23.39 KG/M2 | WEIGHT: 132 LBS | DIASTOLIC BLOOD PRESSURE: 62 MMHG | OXYGEN SATURATION: 99 % | SYSTOLIC BLOOD PRESSURE: 141 MMHG | TEMPERATURE: 98 F | RESPIRATION RATE: 16 BRPM

## 2023-08-02 DIAGNOSIS — R79.89 ELEVATED D-DIMER: ICD-10-CM

## 2023-08-02 DIAGNOSIS — M79.605 LEFT LEG PAIN: ICD-10-CM

## 2023-08-02 PROCEDURE — 96372 THER/PROPH/DIAG INJ SC/IM: CPT

## 2023-08-02 PROCEDURE — 6360000002 HC RX W HCPCS: Performed by: EMERGENCY MEDICINE

## 2023-08-02 PROCEDURE — 93971 EXTREMITY STUDY: CPT

## 2023-08-02 RX ORDER — DICYCLOMINE HCL 20 MG
20 TABLET ORAL 4 TIMES DAILY
Qty: 40 TABLET | Refills: 0 | Status: SHIPPED | OUTPATIENT
Start: 2023-08-02

## 2023-08-02 RX ORDER — ENOXAPARIN SODIUM 100 MG/ML
1 INJECTION SUBCUTANEOUS ONCE
Status: COMPLETED | OUTPATIENT
Start: 2023-08-02 | End: 2023-08-02

## 2023-08-02 RX ADMIN — ENOXAPARIN SODIUM 60 MG: 100 INJECTION SUBCUTANEOUS at 00:22

## 2023-08-02 NOTE — DISCHARGE INSTRUCTIONS
Return later this morning after 7 AM to have your ultrasound for your leg to rule out blood clot. Follow-up with your primary caregiver soon as possible. Use medications as directed.

## 2023-08-02 NOTE — ED NOTES
Discharge instructions and all medications reviewed pt vebalized understanding. No further questions noted at this time. pt's breathing even and unlabored.       Sandrita Johnson RN  08/02/23 [id number]

## 2023-08-02 NOTE — ED PROVIDER NOTES
no edema, no pain    Past Medical History:   Diagnosis Date    Acute deep vein thrombosis (DVT) of distal vein of left lower extremity ([address]) 10/23/2016    DVT involving left peroneal, posterior and anterior tibial veins 10/16 Treated for at least 3 months and repeat venous doppler was negative and was discontinued     Axillary adenopathy 8/28/2014    US of axilla 6/14 showed benign lymph nodes    C. difficile diarrhea 11/18/2016    Treated few times and now is normal    CAD (coronary artery disease)     Chest discomfort 8/1/2017    Colitis 12/13    EGD,COLONOSCOPY, SBFT normal. Dr. Veronica Means    Cyst of right ovary 9/6/2016    Seen Dr Becca Cameron and had several US per pt. Dizzy spells 6/5/2017    H/O 24 hour EKG monitoring 7/27/09    NSR, few PVCs and occasional couplet noted (total 1007 PVCs), no pauses noted. H/O colonoscopy 10/09, 12/13    f/u in 5 years    H/O echocardiogram 9/17/12    Normal LV size and function, mild mitral and tricuspid regurg., EF 60%. H/O mitral valve prolapse 2003    MR mild    H/O myocardial perfusion scan 12/16/14    Stress Cardiolite. Normal perfusion in the distribution of all coronaries, normal LV size and function, EF 70%. H/O screening mammography 8/11    Dr Charla Suarez    History of cardiac monitoring 06/05/2017    Abnormal-SR with complex ventricular arrhythmias and frequent PVCs. No symptoms during marked bradycardia or during the fastest rate of 143 with nonsustained three-beat runs of ventricular tachycardia. History of cardiac monitoring 08/23/2017    Abnormal event monitor findings suggestive of predominantly sinus bradycardia with isolated and frequent low-grade ventricular ectopy with symptoms reported.      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 Tumor closely approximate a peripheral tissue edge    Osteoarthritis of cervical spine 6/7/2016    Ovarian cyst     Papanicolaou smear 09/14/2016    Dr. Shalini Grimaldo    PVCs (premature ventricular contractions)     symptomatic frequent PVCs, sees Dr. Reina Ramsay    Ventricular tachycardia [facility]) 8/1/2017    Exercise induced 8/1/17     Past Surgical History:   Procedure Laterality Date    APPENDECTOMY      BREAST BIOPSY      CATARACT REMOVAL Bilateral     COLONOSCOPY  12/3/13,2-28-17 2.28.17 no need for follow up due to pt's advanced age per Dr. Antoinette Karimi. DIAGNOSTIC CARDIAC CATH LAB PROCEDURE  08/03/2017    Normal coronaries, EF 70%. HYSTERECTOMY (CERVIX STATUS UNKNOWN)  1961    LIVER BIOPSY  10/09    steasosis    UPPER GASTROINTESTINAL ENDOSCOPY  11/02/2017    Dr. Antoinette Karimi     Family History   Problem Relation Age of Onset    Heart Surgery Mother         CABG    Rheum Arthritis Mother     Osteoarthritis Mother     Hypertension Mother     High Cholesterol Mother     Migraines Mother     Stroke Mother     Heart Disease Mother     Heart Disease Brother     Heart Attack Father     Heart Disease Father     Heart Failure Maternal Grandmother     Heart Disease Brother      Social History     Socioeconomic History    Marital status:       Spouse name: Not on file    Number of children: Not on file    Years of education: Not on file    Highest education level: Not on file   Occupational History    Not on file   Tobacco Use    Smoking status: Never    Smokeless tobacco: Never   Vaping Use    Vaping Use: Never used   Substance and Sexual Activity    Alcohol use: Yes     Comment: 1 beers per day or 2 glasses of \"bubbly\"/day,    Drug use: No    Sexual activity: Not Currently     Partners: Male     Comment:    Other Topics Concern    Not on file   Social History Narrative    Not on file     Social Determinants of Health     Financial Resource Strain: Low Risk (H) 0 - 3 %    Basophils % 0.6 0 - 1 %    Segs Absolute 3.2 K/CU MM    Lymphocytes Absolute 2.6 K/CU MM    Monocytes Absolute 0.6 K/CU MM    Eosinophils Absolute 0.2 K/CU MM    Basophils Absolute 0.0 K/CU MM    Immature Neutrophil % 0.2 0 - 0.43 %    Total Immature Neutrophil 0.01 K/CU MM   Comprehensive Metabolic Panel   Result Value Ref Range    Sodium 137 135 - 145 MMOL/L    Potassium 3.9 3.5 - 5.1 MMOL/L    Chloride 103 99 - 110 mMol/L    CO2 26 21 - 32 MMOL/L    BUN 21 6 - 23 MG/DL    Creatinine 0.9 0.6 - 1.1 MG/DL    Est, Glom Filt Rate >60 >60 mL/min/1.73m2    Glucose 117 (H) 70 - 99 MG/DL    Calcium 9.1 8.3 - 10.6 MG/DL    Albumin 3.9 3.4 - 5.0 GM/DL    Total Protein 6.4 6.4 - 8.2 GM/DL    Total Bilirubin 0.3 0.0 - 1.0 MG/DL    ALT 24 10 - 40 U/L    AST 45 (H) 15 - 37 IU/L    Alkaline Phosphatase 82 40 - 129 IU/L    Anion Gap 8 4 - 16   Lipase   Result Value Ref Range    Lipase 34 13 - 60 IU/L   Urinalysis with Reflex to Culture    Specimen: Urine   Result Value Ref Range    Color, UA YELLOW YELLOW    Clarity, UA CLEAR CLEAR    Glucose, Urine NEGATIVE NEGATIVE MG/DL    Bilirubin Urine NEGATIVE NEGATIVE MG/DL    Ketones, Urine NEGATIVE NEGATIVE MG/DL    Specific Gravity, UA 1.010 1.001 - 1.035    Blood, Urine NEGATIVE NEGATIVE    pH, Urine 6.0 5.0 - 8.0    Protein, UA NEGATIVE NEGATIVE MG/DL    Urobilinogen, Urine 0.2 0.2 - 1.0 MG/DL    Nitrite Urine, Quantitative NEGATIVE NEGATIVE    Leukocyte Esterase, Urine NEGATIVE NEGATIVE    Urinalysis Comments       Microscopic exam not performed based on chemical results.    D-Dimer, Rapid   Result Value Ref Range    D-Dimer, Quant 1.02 (H) <0.47 ug/mL (FEU)           Radiographs (if obtained):  [] The following radiograph was interpreted by myself in the absence of a radiologist:  [x] Radiologist's Report reviewed at time of ED visit:  VL DUP LOWER EXTREMITY VENOUS LEFT    (Results Pending)       ED Course and MDM:  This patient presents to the emergency department

## 2023-08-04 ENCOUNTER — HOSPITAL ENCOUNTER (OUTPATIENT)
Age: 83
Discharge: HOME | End: 2023-08-04
Payer: MEDICARE

## 2023-08-04 DIAGNOSIS — I10 ESSENTIAL HYPERTENSION: ICD-10-CM

## 2023-08-04 DIAGNOSIS — E78.2 MIXED HYPERLIPIDEMIA: ICD-10-CM

## 2023-08-04 LAB
ALBUMIN SERPL-MCNC: 4.4 GM/DL (ref 3.4–5)
ALP BLD-CCNC: 70 IU/L (ref 40–129)
ALT SERPL-CCNC: 28 U/L (ref 10–40)
ANION GAP SERPL CALCULATED.3IONS-SCNC: 7 MMOL/L (ref 4–16)
AST SERPL-CCNC: 50 IU/L (ref 15–37)
BASOPHILS ABSOLUTE: 0.1 K/CU MM
BASOPHILS RELATIVE PERCENT: 1 % (ref 0–1)
BILIRUB SERPL-MCNC: 0.6 MG/DL (ref 0–1)
BUN SERPL-MCNC: 17 MG/DL (ref 6–23)
CALCIUM SERPL-MCNC: 9.8 MG/DL (ref 8.3–10.6)
CHLORIDE BLD-SCNC: 104 MMOL/L (ref 99–110)
CHOLESTEROL, FASTING: 149 MG/DL
CO2: 28 MMOL/L (ref 21–32)
CREAT SERPL-MCNC: 0.9 MG/DL (ref 0.6–1.1)
DIFFERENTIAL TYPE: ABNORMAL
EOSINOPHILS ABSOLUTE: 0.2 K/CU MM
EOSINOPHILS RELATIVE PERCENT: 3.2 % (ref 0–3)
GFR SERPL CREATININE-BSD FRML MDRD: >60 ML/MIN/1.73M2
GLUCOSE SERPL-MCNC: 100 MG/DL (ref 70–99)
HCT VFR BLD CALC: 43 % (ref 37–47)
HDLC SERPL-MCNC: 68 MG/DL
HEMOGLOBIN: 14 GM/DL (ref 12.5–16)
IMMATURE NEUTROPHIL %: 0.2 % (ref 0–0.43)
LDLC SERPL CALC-MCNC: 63 MG/DL
LYMPHOCYTES ABSOLUTE: 2.3 K/CU MM
LYMPHOCYTES RELATIVE PERCENT: 37.2 % (ref 24–44)
MCH RBC QN AUTO: 29.9 PG (ref 27–31)
MCHC RBC AUTO-ENTMCNC: 32.6 % (ref 32–36)
MCV RBC AUTO: 91.7 FL (ref 78–100)
MONOCYTES ABSOLUTE: 0.5 K/CU MM
MONOCYTES RELATIVE PERCENT: 7.3 % (ref 0–4)
PDW BLD-RTO: 13 % (ref 11.7–14.9)
PLATELET # BLD: 215 K/CU MM (ref 140–440)
PMV BLD AUTO: 10.3 FL (ref 7.5–11.1)
POTASSIUM SERPL-SCNC: 4.4 MMOL/L (ref 3.5–5.1)
RBC # BLD: 4.69 M/CU MM (ref 4.2–5.4)
SEGMENTED NEUTROPHILS ABSOLUTE COUNT: 3.2 K/CU MM
SEGMENTED NEUTROPHILS RELATIVE PERCENT: 51.1 % (ref 36–66)
SODIUM BLD-SCNC: 139 MMOL/L (ref 135–145)
TOTAL IMMATURE NEUTOROPHIL: 0.01 K/CU MM
TOTAL PROTEIN: 6.7 GM/DL (ref 6.4–8.2)
TRIGLYCERIDE, FASTING: 88 MG/DL
WBC # BLD: 6.2 K/CU MM (ref 4–10.5)

## 2023-08-04 PROCEDURE — 80053 COMPREHEN METABOLIC PANEL: CPT

## 2023-08-04 PROCEDURE — 85025 COMPLETE CBC W/AUTO DIFF WBC: CPT

## 2023-08-04 PROCEDURE — 36415 COLL VENOUS BLD VENIPUNCTURE: CPT

## 2023-08-04 PROCEDURE — 80061 LIPID PANEL: CPT

## 2023-08-09 ENCOUNTER — OFFICE VISIT (OUTPATIENT)
Dept: INTERNAL MEDICINE CLINIC | Age: 83
End: 2023-08-09
Payer: MEDICARE

## 2023-08-09 VITALS
SYSTOLIC BLOOD PRESSURE: 110 MMHG | OXYGEN SATURATION: 96 % | DIASTOLIC BLOOD PRESSURE: 64 MMHG | HEART RATE: 68 BPM | RESPIRATION RATE: 16 BRPM | BODY MASS INDEX: 23.56 KG/M2 | WEIGHT: 133 LBS | TEMPERATURE: 97 F

## 2023-08-09 DIAGNOSIS — K76.89 LIVER DYSFUNCTION: ICD-10-CM

## 2023-08-09 DIAGNOSIS — K76.9 HEPATIC DYSFUNCTION: ICD-10-CM

## 2023-08-09 DIAGNOSIS — R74.01 TRANSAMINASEMIA: ICD-10-CM

## 2023-08-09 DIAGNOSIS — R79.89 ABNORMAL LFTS (LIVER FUNCTION TESTS): Primary | ICD-10-CM

## 2023-08-09 DIAGNOSIS — D03.62 MELANOMA IN SITU OF LEFT UPPER EXTREMITY INCLUDING SHOULDER (HCC): ICD-10-CM

## 2023-08-09 PROCEDURE — 3078F DIAST BP <80 MM HG: CPT | Performed by: INTERNAL MEDICINE

## 2023-08-09 PROCEDURE — 1036F TOBACCO NON-USER: CPT | Performed by: INTERNAL MEDICINE

## 2023-08-09 PROCEDURE — 3074F SYST BP LT 130 MM HG: CPT | Performed by: INTERNAL MEDICINE

## 2023-08-09 PROCEDURE — G8420 CALC BMI NORM PARAMETERS: HCPCS | Performed by: INTERNAL MEDICINE

## 2023-08-09 PROCEDURE — 1090F PRES/ABSN URINE INCON ASSESS: CPT | Performed by: INTERNAL MEDICINE

## 2023-08-09 PROCEDURE — 99213 OFFICE O/P EST LOW 20 MIN: CPT | Performed by: INTERNAL MEDICINE

## 2023-08-09 PROCEDURE — G8427 DOCREV CUR MEDS BY ELIG CLIN: HCPCS | Performed by: INTERNAL MEDICINE

## 2023-08-09 PROCEDURE — G8399 PT W/DXA RESULTS DOCUMENT: HCPCS | Performed by: INTERNAL MEDICINE

## 2023-08-09 PROCEDURE — 1123F ACP DISCUSS/DSCN MKR DOCD: CPT | Performed by: INTERNAL MEDICINE

## 2023-08-10 ENCOUNTER — HOSPITAL ENCOUNTER (OUTPATIENT)
Age: 83
Discharge: HOME | End: 2023-08-10
Payer: MEDICARE

## 2023-08-10 DIAGNOSIS — R74.01 TRANSAMINASEMIA: ICD-10-CM

## 2023-08-10 DIAGNOSIS — R79.89 ABNORMAL LFTS (LIVER FUNCTION TESTS): ICD-10-CM

## 2023-08-10 LAB
FERRITIN: 65 NG/ML (ref 15–150)
IRON: 90 UG/DL (ref 37–145)
PCT TRANSFERRIN: 26 % (ref 10–44)
TOTAL IRON BINDING CAPACITY: 347 UG/DL (ref 250–450)
UNSATURATED IRON BINDING CAPACITY: 257 UG/DL (ref 110–370)

## 2023-08-10 PROCEDURE — 80074 ACUTE HEPATITIS PANEL: CPT

## 2023-08-10 PROCEDURE — 82728 ASSAY OF FERRITIN: CPT

## 2023-08-10 PROCEDURE — 86256 FLUORESCENT ANTIBODY TITER: CPT

## 2023-08-10 PROCEDURE — 83550 IRON BINDING TEST: CPT

## 2023-08-10 PROCEDURE — 83516 IMMUNOASSAY NONANTIBODY: CPT

## 2023-08-10 PROCEDURE — 86038 ANTINUCLEAR ANTIBODIES: CPT

## 2023-08-10 PROCEDURE — 83540 ASSAY OF IRON: CPT

## 2023-08-10 PROCEDURE — 36415 COLL VENOUS BLD VENIPUNCTURE: CPT

## 2023-08-11 LAB
HAV IGM SERPL QL IA: NON REACTIVE
HBV CORE IGM SERPL QL IA: NON REACTIVE
HBV SURFACE AG SERPL QL IA: NON REACTIVE
HCV AB SERPL QL IA: NON REACTIVE

## 2023-08-12 LAB
MITOCHONDRIA M2 IGG SER-ACNC: 6.6 UNITS (ref 0–24.9)
NUCLEAR IGG SER QL IA: NORMAL
SMA IGG SER-ACNC: 14 UNITS (ref 0–19)

## 2023-08-23 ENCOUNTER — OFFICE VISIT (OUTPATIENT)
Dept: INTERNAL MEDICINE CLINIC | Age: 83
End: 2023-08-23
Payer: MEDICARE

## 2023-08-23 VITALS
HEART RATE: 56 BPM | OXYGEN SATURATION: 97 % | BODY MASS INDEX: 23.74 KG/M2 | RESPIRATION RATE: 12 BRPM | DIASTOLIC BLOOD PRESSURE: 60 MMHG | TEMPERATURE: 97.2 F | SYSTOLIC BLOOD PRESSURE: 112 MMHG | WEIGHT: 134 LBS

## 2023-08-23 DIAGNOSIS — E78.2 MIXED HYPERLIPIDEMIA: ICD-10-CM

## 2023-08-23 DIAGNOSIS — K76.9 HEPATIC DYSFUNCTION: ICD-10-CM

## 2023-08-23 DIAGNOSIS — K76.89 LIVER DYSFUNCTION: Primary | ICD-10-CM

## 2023-08-23 PROCEDURE — 1123F ACP DISCUSS/DSCN MKR DOCD: CPT | Performed by: INTERNAL MEDICINE

## 2023-08-23 PROCEDURE — 99213 OFFICE O/P EST LOW 20 MIN: CPT | Performed by: INTERNAL MEDICINE

## 2023-08-23 PROCEDURE — G8420 CALC BMI NORM PARAMETERS: HCPCS | Performed by: INTERNAL MEDICINE

## 2023-08-23 PROCEDURE — 3074F SYST BP LT 130 MM HG: CPT | Performed by: INTERNAL MEDICINE

## 2023-08-23 PROCEDURE — 1090F PRES/ABSN URINE INCON ASSESS: CPT | Performed by: INTERNAL MEDICINE

## 2023-08-23 PROCEDURE — G8427 DOCREV CUR MEDS BY ELIG CLIN: HCPCS | Performed by: INTERNAL MEDICINE

## 2023-08-23 PROCEDURE — G8399 PT W/DXA RESULTS DOCUMENT: HCPCS | Performed by: INTERNAL MEDICINE

## 2023-08-23 PROCEDURE — 1036F TOBACCO NON-USER: CPT | Performed by: INTERNAL MEDICINE

## 2023-08-23 PROCEDURE — 3078F DIAST BP <80 MM HG: CPT | Performed by: INTERNAL MEDICINE

## 2023-10-23 ENCOUNTER — HOSPITAL ENCOUNTER (OUTPATIENT)
Age: 83
Discharge: HOME | End: 2023-10-23
Payer: MEDICARE

## 2023-10-23 DIAGNOSIS — K76.89 LIVER DYSFUNCTION: ICD-10-CM

## 2023-10-23 LAB
ALBUMIN SERPL-MCNC: 4.1 GM/DL (ref 3.4–5)
ALP BLD-CCNC: 74 IU/L (ref 40–129)
ALT SERPL-CCNC: 22 U/L (ref 10–40)
AST SERPL-CCNC: 28 IU/L (ref 15–37)
BILIRUB SERPL-MCNC: 0.6 MG/DL (ref 0–1)
BILIRUBIN DIRECT: 0.2 MG/DL (ref 0–0.3)
BILIRUBIN, INDIRECT: 0.4 MG/DL (ref 0–0.7)
TOTAL PROTEIN: 7 GM/DL (ref 6.4–8.2)

## 2023-10-23 PROCEDURE — 80076 HEPATIC FUNCTION PANEL: CPT

## 2023-10-23 PROCEDURE — 36415 COLL VENOUS BLD VENIPUNCTURE: CPT

## 2023-10-25 ENCOUNTER — OFFICE VISIT (OUTPATIENT)
Dept: INTERNAL MEDICINE CLINIC | Age: 83
End: 2023-10-25
Payer: MEDICARE

## 2023-10-25 VITALS
TEMPERATURE: 97.3 F | RESPIRATION RATE: 16 BRPM | WEIGHT: 134.8 LBS | DIASTOLIC BLOOD PRESSURE: 70 MMHG | BODY MASS INDEX: 23.88 KG/M2 | HEART RATE: 72 BPM | SYSTOLIC BLOOD PRESSURE: 132 MMHG

## 2023-10-25 DIAGNOSIS — R00.2 HEART PALPITATIONS: ICD-10-CM

## 2023-10-25 DIAGNOSIS — K52.9 COLITIS: ICD-10-CM

## 2023-10-25 DIAGNOSIS — K21.9 GASTROESOPHAGEAL REFLUX DISEASE WITHOUT ESOPHAGITIS: ICD-10-CM

## 2023-10-25 DIAGNOSIS — K76.89 LIVER DYSFUNCTION: ICD-10-CM

## 2023-10-25 DIAGNOSIS — M81.0 AGE-RELATED OSTEOPOROSIS WITHOUT CURRENT PATHOLOGICAL FRACTURE: ICD-10-CM

## 2023-10-25 DIAGNOSIS — I49.3 PVCS (PREMATURE VENTRICULAR CONTRACTIONS): ICD-10-CM

## 2023-10-25 DIAGNOSIS — R74.01 TRANSAMINASEMIA: Primary | ICD-10-CM

## 2023-10-25 DIAGNOSIS — E03.9 ACQUIRED HYPOTHYROIDISM: ICD-10-CM

## 2023-10-25 DIAGNOSIS — I10 ESSENTIAL HYPERTENSION: ICD-10-CM

## 2023-10-25 PROBLEM — M54.41 ACUTE RIGHT-SIDED LOW BACK PAIN WITH RIGHT-SIDED SCIATICA: Status: RESOLVED | Noted: 2022-10-10 | Resolved: 2023-10-25

## 2023-10-25 PROCEDURE — 3078F DIAST BP <80 MM HG: CPT | Performed by: INTERNAL MEDICINE

## 2023-10-25 PROCEDURE — G8399 PT W/DXA RESULTS DOCUMENT: HCPCS | Performed by: INTERNAL MEDICINE

## 2023-10-25 PROCEDURE — G0008 ADMIN INFLUENZA VIRUS VAC: HCPCS | Performed by: INTERNAL MEDICINE

## 2023-10-25 PROCEDURE — G8484 FLU IMMUNIZE NO ADMIN: HCPCS | Performed by: INTERNAL MEDICINE

## 2023-10-25 PROCEDURE — G8427 DOCREV CUR MEDS BY ELIG CLIN: HCPCS | Performed by: INTERNAL MEDICINE

## 2023-10-25 PROCEDURE — 1090F PRES/ABSN URINE INCON ASSESS: CPT | Performed by: INTERNAL MEDICINE

## 2023-10-25 PROCEDURE — 3074F SYST BP LT 130 MM HG: CPT | Performed by: INTERNAL MEDICINE

## 2023-10-25 PROCEDURE — 90694 VACC AIIV4 NO PRSRV 0.5ML IM: CPT | Performed by: INTERNAL MEDICINE

## 2023-10-25 PROCEDURE — 99214 OFFICE O/P EST MOD 30 MIN: CPT | Performed by: INTERNAL MEDICINE

## 2023-10-25 PROCEDURE — 1123F ACP DISCUSS/DSCN MKR DOCD: CPT | Performed by: INTERNAL MEDICINE

## 2023-10-25 PROCEDURE — 1036F TOBACCO NON-USER: CPT | Performed by: INTERNAL MEDICINE

## 2023-10-25 PROCEDURE — G8420 CALC BMI NORM PARAMETERS: HCPCS | Performed by: INTERNAL MEDICINE

## 2023-10-25 NOTE — PROGRESS NOTES
Vaccine Information Sheet, \"Influenza - Inactivated\"  given to Santos Crouch, or parent/legal guardian of  Santos Crouch and verbalized understanding. Patient responses:    Have you ever had a reaction to a flu vaccine? No  Are you able to eat eggs without adverse effects? Yes  Do you have any current illness? No  Have you ever had Guillian Lamy Syndrome? No    Flu vaccine given per order. Please see immunization tab.

## 2023-10-27 ENCOUNTER — OFFICE VISIT (OUTPATIENT)
Dept: CARDIOLOGY CLINIC | Age: 83
End: 2023-10-27

## 2023-10-27 VITALS
WEIGHT: 137 LBS | BODY MASS INDEX: 24.27 KG/M2 | DIASTOLIC BLOOD PRESSURE: 58 MMHG | SYSTOLIC BLOOD PRESSURE: 124 MMHG | HEART RATE: 66 BPM

## 2023-10-27 DIAGNOSIS — R00.2 HEART PALPITATIONS: ICD-10-CM

## 2023-10-27 DIAGNOSIS — I49.3 PVC (PREMATURE VENTRICULAR CONTRACTION): Primary | ICD-10-CM

## 2023-10-27 DIAGNOSIS — R06.02 SOB (SHORTNESS OF BREATH): ICD-10-CM

## 2023-10-27 NOTE — PROGRESS NOTES
Electrophysiology FU Note      Reason for consultation: PVC    Chief complaint :   6 MONTHS FOLLOW UP    Referring physician:  Rizwan Bryan      Primary care physician: Edgardo Ernst MD      History of Present Illness: This visit (10/27/2023)      Chief Complaint   Patient presents with    6 Month Follow-Up     No new cardiac symptoms - Denies chest pain, shortness of breath, palpitations, syncope or presyncope, edema            Previous visit:  (4/21/2023)      Chief Complaint   Patient presents with    Results     Follow up for echo results. Occ palp and edema bilat. Palpitations    Edema           Previous visit: (4/7/2023)      Chief Complaint   Patient presents with    Palpitations     Pt can feel PVCs     Chest Pain     No chest pain, pt states that shoulder started aching this morning, ache went down arm into finger tips. Previous visit:  (4/1/2022)      Chief Complaint   Patient presents with    Follow-up     Dr. Jeanine Oppenheim pt here for 4 month f/u, Patient was to think about Sotalol and patient decided she does not want to take it. Patient denies any cardiac complaints, Patient does not smoke and drinks alcohol. Patient does exercise. Previous visit:(9/24/2021)      Chief Complaint   Patient presents with    Dewaine Forte Dr. Jeanine Oppenheim patient here for 3 month f/u. Patient denies Chest Pain, Palpitations, SOB, Dizziness, Edema. Patient does not smoke and drinks alcohol occ and does not drink caffiene. Patient does exercise daily. Previous visit:(6/23/2021)      Chief Complaint   Patient presents with    New Patient     pt. here today for consult for PVC's see's Dr. Jeanine Oppenheim. Pt. denies chest pain, SOB, edema. Pt. denies use of tobacco and drugs. Pt. drinks maybe once a day, not always everyday. Pt. rides stationary bike some at home and walks around at stores. Palpitations     skipping on and off. Dizziness     sometimes.

## 2023-12-15 DIAGNOSIS — E03.9 ACQUIRED HYPOTHYROIDISM: ICD-10-CM

## 2023-12-15 DIAGNOSIS — E78.2 MIXED HYPERLIPIDEMIA: ICD-10-CM

## 2023-12-15 RX ORDER — BENAZEPRIL HYDROCHLORIDE 10 MG/1
TABLET ORAL
Qty: 90 TABLET | Refills: 1 | Status: SHIPPED | OUTPATIENT
Start: 2023-12-15

## 2023-12-15 RX ORDER — LEVOTHYROXINE SODIUM 0.05 MG/1
TABLET ORAL
Qty: 90 TABLET | Refills: 1 | Status: SHIPPED | OUTPATIENT
Start: 2023-12-15

## 2023-12-15 RX ORDER — ATORVASTATIN CALCIUM 10 MG/1
TABLET, FILM COATED ORAL
Qty: 90 TABLET | Refills: 1 | Status: SHIPPED | OUTPATIENT
Start: 2023-12-15

## 2023-12-29 ENCOUNTER — OFFICE VISIT (OUTPATIENT)
Dept: CARDIOLOGY CLINIC | Age: 83
End: 2023-12-29
Payer: MEDICARE

## 2023-12-29 VITALS
HEART RATE: 72 BPM | DIASTOLIC BLOOD PRESSURE: 68 MMHG | SYSTOLIC BLOOD PRESSURE: 130 MMHG | HEIGHT: 63 IN | BODY MASS INDEX: 24.27 KG/M2 | WEIGHT: 137 LBS

## 2023-12-29 DIAGNOSIS — E78.2 MIXED HYPERLIPIDEMIA: ICD-10-CM

## 2023-12-29 DIAGNOSIS — I49.3 PVCS (PREMATURE VENTRICULAR CONTRACTIONS): Primary | ICD-10-CM

## 2023-12-29 PROCEDURE — G8420 CALC BMI NORM PARAMETERS: HCPCS | Performed by: NURSE PRACTITIONER

## 2023-12-29 PROCEDURE — G8484 FLU IMMUNIZE NO ADMIN: HCPCS | Performed by: NURSE PRACTITIONER

## 2023-12-29 PROCEDURE — 3078F DIAST BP <80 MM HG: CPT | Performed by: NURSE PRACTITIONER

## 2023-12-29 PROCEDURE — 1123F ACP DISCUSS/DSCN MKR DOCD: CPT | Performed by: NURSE PRACTITIONER

## 2023-12-29 PROCEDURE — G8399 PT W/DXA RESULTS DOCUMENT: HCPCS | Performed by: NURSE PRACTITIONER

## 2023-12-29 PROCEDURE — G8427 DOCREV CUR MEDS BY ELIG CLIN: HCPCS | Performed by: NURSE PRACTITIONER

## 2023-12-29 PROCEDURE — 3075F SYST BP GE 130 - 139MM HG: CPT | Performed by: NURSE PRACTITIONER

## 2023-12-29 PROCEDURE — 1090F PRES/ABSN URINE INCON ASSESS: CPT | Performed by: NURSE PRACTITIONER

## 2023-12-29 PROCEDURE — 1036F TOBACCO NON-USER: CPT | Performed by: NURSE PRACTITIONER

## 2023-12-29 PROCEDURE — 99214 OFFICE O/P EST MOD 30 MIN: CPT | Performed by: NURSE PRACTITIONER

## 2023-12-29 PROCEDURE — 93000 ELECTROCARDIOGRAM COMPLETE: CPT | Performed by: NURSE PRACTITIONER

## 2023-12-29 NOTE — PATIENT INSTRUCTIONS
We are committed to providing you the best care possible. If you receive a survey after visiting one of our offices, please take time to share your experience concerning your physician office visit. These surveys are confidential and no health information about you is shared. We are eager to improve for you and we are counting on your feedback to help make that happen. Please be informed that if you contact our office outside of normal business hours the physician on call cannot help with any scheduling or rescheduling issues, procedure instruction questions or any type of medication issue. We advise you for any urgent/emergency that you go to the nearest emergency room! PLEASE CALL OUR OFFICE DURING NORMAL BUSINESS HOURS    Monday - Friday   8 am to 5 pm    [place]: [address] [place]: [phone]    [place]:  [phone]  Thank you for allowing us to care for you today! We want to ensure we can follow your treatment plan and we strive to give you the best outcomes and experience possible. If you ever have a life threatening emergency and call 911 - for an ambulance (EMS)   Our providers can only care for you at:   [facility] or [facility]. Even if you have someone take you or you drive yourself we can only care for you in a [facility] facility. Our providers are not setup at the other healthcare locations! **It is YOUR responsibilty to bring medication bottles and/or updated medication list to [address].  This will allow us to better serve you and all your healthcare needs**

## 2024-01-25 ENCOUNTER — OFFICE VISIT (OUTPATIENT)
Dept: INTERNAL MEDICINE CLINIC | Age: 84
End: 2024-01-25
Payer: MEDICARE

## 2024-01-25 ENCOUNTER — HOSPITAL ENCOUNTER (EMERGENCY)
Age: 84
Discharge: HOME | End: 2024-01-25
Attending: EMERGENCY MEDICINE
Payer: MEDICARE

## 2024-01-25 ENCOUNTER — APPOINTMENT (OUTPATIENT)
Dept: GENERAL RADIOLOGY | Age: 84
End: 2024-01-25
Payer: MEDICARE

## 2024-01-25 VITALS
WEIGHT: 135 LBS | BODY MASS INDEX: 23.92 KG/M2 | HEART RATE: 68 BPM | SYSTOLIC BLOOD PRESSURE: 151 MMHG | HEIGHT: 63 IN | TEMPERATURE: 97.8 F | OXYGEN SATURATION: 100 % | DIASTOLIC BLOOD PRESSURE: 74 MMHG | RESPIRATION RATE: 18 BRPM

## 2024-01-25 VITALS
SYSTOLIC BLOOD PRESSURE: 128 MMHG | TEMPERATURE: 97.2 F | DIASTOLIC BLOOD PRESSURE: 62 MMHG | OXYGEN SATURATION: 97 % | WEIGHT: 134 LBS | HEART RATE: 46 BPM | RESPIRATION RATE: 12 BRPM | BODY MASS INDEX: 23.74 KG/M2

## 2024-01-25 DIAGNOSIS — E78.2 MIXED HYPERLIPIDEMIA: ICD-10-CM

## 2024-01-25 DIAGNOSIS — I49.9 IRREGULAR HEART RATE: ICD-10-CM

## 2024-01-25 DIAGNOSIS — I47.20 VENTRICULAR TACHYCARDIA (HCC): ICD-10-CM

## 2024-01-25 DIAGNOSIS — I10 ESSENTIAL HYPERTENSION: ICD-10-CM

## 2024-01-25 DIAGNOSIS — D03.62 MELANOMA IN SITU OF LEFT UPPER EXTREMITY INCLUDING SHOULDER (HCC): ICD-10-CM

## 2024-01-25 DIAGNOSIS — R07.9 CHEST PAIN, UNSPECIFIED TYPE: Primary | ICD-10-CM

## 2024-01-25 DIAGNOSIS — R07.2 PRECORDIAL PAIN: Primary | ICD-10-CM

## 2024-01-25 LAB
ALBUMIN SERPL-MCNC: 4.1 GM/DL (ref 3.4–5)
ALP BLD-CCNC: 76 IU/L (ref 40–129)
ALT SERPL-CCNC: 20 U/L (ref 10–40)
ANION GAP SERPL CALCULATED.3IONS-SCNC: 13 MMOL/L (ref 7–16)
AST SERPL-CCNC: 27 IU/L (ref 15–37)
BILIRUB SERPL-MCNC: 0.3 MG/DL (ref 0–1)
BUN SERPL-MCNC: 24 MG/DL (ref 6–23)
CALCIUM SERPL-MCNC: 9.6 MG/DL (ref 8.3–10.6)
CHLORIDE BLD-SCNC: 101 MMOL/L (ref 99–110)
CO2: 23 MMOL/L (ref 21–32)
CREAT SERPL-MCNC: 0.8 MG/DL (ref 0.6–1.1)
EKG ATRIAL RATE: 51 BPM
EKG DIAGNOSIS: NORMAL
EKG P AXIS: 74 DEGREES
EKG P-R INTERVAL: 196 MS
EKG Q-T INTERVAL: 444 MS
EKG QRS DURATION: 74 MS
EKG QTC CALCULATION (BAZETT): 409 MS
EKG R AXIS: 62 DEGREES
EKG T AXIS: 29 DEGREES
EKG VENTRICULAR RATE: 51 BPM
GFR SERPL CREATININE-BSD FRML MDRD: >60 ML/MIN/1.73M2
GLUCOSE SERPL-MCNC: 85 MG/DL (ref 70–99)
HCT VFR BLD CALC: 41.6 % (ref 37–47)
HEMOGLOBIN: 13.6 GM/DL (ref 12.5–16)
MCH RBC QN AUTO: 29.8 PG (ref 27–31)
MCHC RBC AUTO-ENTMCNC: 32.7 % (ref 32–36)
MCV RBC AUTO: 91.2 FL (ref 78–100)
PDW BLD-RTO: 12.9 % (ref 11.7–14.9)
PLATELET # BLD: 197 K/CU MM (ref 140–440)
PMV BLD AUTO: 10.5 FL (ref 7.5–11.1)
POTASSIUM SERPL-SCNC: 4.3 MMOL/L (ref 3.5–5.1)
RBC # BLD: 4.56 M/CU MM (ref 4.2–5.4)
SODIUM BLD-SCNC: 137 MMOL/L (ref 135–145)
TOTAL PROTEIN: 6.9 GM/DL (ref 6.4–8.2)
TROPONIN, HIGH SENSITIVITY: 13 NG/L (ref 0–14)
TROPONIN, HIGH SENSITIVITY: 13 NG/L (ref 0–14)
WBC # BLD: 8.2 K/CU MM (ref 4–10.5)

## 2024-01-25 PROCEDURE — 71045 X-RAY EXAM CHEST 1 VIEW: CPT

## 2024-01-25 PROCEDURE — 3074F SYST BP LT 130 MM HG: CPT | Performed by: INTERNAL MEDICINE

## 2024-01-25 PROCEDURE — 3078F DIAST BP <80 MM HG: CPT | Performed by: INTERNAL MEDICINE

## 2024-01-25 PROCEDURE — 80053 COMPREHEN METABOLIC PANEL: CPT

## 2024-01-25 PROCEDURE — G8399 PT W/DXA RESULTS DOCUMENT: HCPCS | Performed by: INTERNAL MEDICINE

## 2024-01-25 PROCEDURE — 1036F TOBACCO NON-USER: CPT | Performed by: INTERNAL MEDICINE

## 2024-01-25 PROCEDURE — 85027 COMPLETE CBC AUTOMATED: CPT

## 2024-01-25 PROCEDURE — 99213 OFFICE O/P EST LOW 20 MIN: CPT | Performed by: INTERNAL MEDICINE

## 2024-01-25 PROCEDURE — 93000 ELECTROCARDIOGRAM COMPLETE: CPT | Performed by: INTERNAL MEDICINE

## 2024-01-25 PROCEDURE — G8420 CALC BMI NORM PARAMETERS: HCPCS | Performed by: INTERNAL MEDICINE

## 2024-01-25 PROCEDURE — 84484 ASSAY OF TROPONIN QUANT: CPT

## 2024-01-25 PROCEDURE — G8427 DOCREV CUR MEDS BY ELIG CLIN: HCPCS | Performed by: INTERNAL MEDICINE

## 2024-01-25 PROCEDURE — G8484 FLU IMMUNIZE NO ADMIN: HCPCS | Performed by: INTERNAL MEDICINE

## 2024-01-25 PROCEDURE — 1090F PRES/ABSN URINE INCON ASSESS: CPT | Performed by: INTERNAL MEDICINE

## 2024-01-25 PROCEDURE — 93010 ELECTROCARDIOGRAM REPORT: CPT | Performed by: INTERNAL MEDICINE

## 2024-01-25 PROCEDURE — 99285 EMERGENCY DEPT VISIT HI MDM: CPT

## 2024-01-25 PROCEDURE — 93005 ELECTROCARDIOGRAM TRACING: CPT | Performed by: EMERGENCY MEDICINE

## 2024-01-25 PROCEDURE — 1123F ACP DISCUSS/DSCN MKR DOCD: CPT | Performed by: INTERNAL MEDICINE

## 2024-01-25 ASSESSMENT — PATIENT HEALTH QUESTIONNAIRE - PHQ9
SUM OF ALL RESPONSES TO PHQ QUESTIONS 1-9: 0
2. FEELING DOWN, DEPRESSED OR HOPELESS: 0
SUM OF ALL RESPONSES TO PHQ9 QUESTIONS 1 & 2: 0
SUM OF ALL RESPONSES TO PHQ QUESTIONS 1-9: 0
SUM OF ALL RESPONSES TO PHQ QUESTIONS 1-9: 0
1. LITTLE INTEREST OR PLEASURE IN DOING THINGS: 0
SUM OF ALL RESPONSES TO PHQ QUESTIONS 1-9: 0

## 2024-01-25 ASSESSMENT — PAIN SCALES - GENERAL: PAINLEVEL_OUTOF10: 0

## 2024-01-25 NOTE — PROGRESS NOTES
Brenda Ye  Patient's  is 1940  Seen in office on 2024      SUBJECTIVE:  Brenda brink 83 y.o.year old female presents today   Chief Complaint   Patient presents with    3 Month Follow-Up    Pain     Last pm started in her left clavicle down to left elbow and across her chest, sharp pains, took an 325 mg ASA and pain went away. Felt nauseated and dizzy last pm, states she feels \"unbalanced\" today    Other     Low heart rate     Patient came here for the routine appointment but she states  Last night she had a pain in the left shoulder going to the elbow and also developed chest pain under the left breast.  That lasted for about an hours.  Denies any dizziness.  She took 4 baby aspirin.  She denies any shortness of breath with that.    She came to the office she has no chest pain.  But she does states she feels nauseated  She had a cardiac cath done in  that was normal.  She has a history of cardiac arrhythmias ventricular tachycardia.  She has seen Dr. Armas.  He wanted her to be on some medication but she does not want to take any medication for that and refused ablation also.    She has a history of hypertension, hyperlipidemia and hypothyroidism.  She is taking medications for that.  Her blood pressure is stable.  Taking medications regularly. No side effects noted.    Review of Systems    OBJECTIVE: /62   Pulse (!) 46 Comment: irreg  Temp 97.2 °F (36.2 °C) (Temporal)   Resp 12   Wt 60.8 kg (134 lb)   SpO2 97%   BMI 23.74 kg/m²     Wt Readings from Last 3 Encounters:   24 60.8 kg (134 lb)   23 62.1 kg (137 lb)   10/27/23 62.1 kg (137 lb)      GENERAL: - Alert, oriented, pleasant, in no apparent distress.    HEENT: - Conjunctiva pink, no scleral icterus. ENT clear.  NECK: -Supple.  No jugular venous distention noted. No masses felt,  CARDIOVASCULAR: - Normal S1 and S2    PULMONARY: - No respiratory distress.  No wheezes or rales.    ABDOMEN: - Soft and non-tender,no masses

## 2024-01-25 NOTE — ED PROVIDER NOTES
degrees    T Axis 29 degrees    Diagnosis       Sinus bradycardia  Otherwise normal ECG  When compared with ECG of 09-JAN-2020 10:21,  premature atrial complexes are no longer present        Radiographs (if obtained):  [] The following radiograph was interpreted by myself in the absence of a radiologist:   [x] Radiologist's Report Reviewed:  XR CHEST PORTABLE   Final Result   No acute abnormality.             Procedures:  12 lead EKG per my interpretation:  Normal Sinus Rhythm  Rate: 51  QTc is normal  There are no T wave changes.  There are are no ST concerning segment changes.    Prior EKG to compare with was available and is the same    (All EKG's are interpreted by myself in the absence of a cardiologist)      Chart review shows recent radiographs:  No results found.      MDM:     Discussion with Other Professionals : None    Social Determinants: Psychiatric illness (i.e. anxiety, depression, substance use disorder, bipolar, schizophrenia, mood disorder, etc)    Records Reviewed : Outpatient Notes shows the patient does have anxiety based off of notes from cardiology last year.  Patient had cardiology visit just a few weeks ago as well and at that time had fairly reassuring workup.  Has an echocardiogram scheduled for a few months from now.  Previously was told by cardiology that she does need an ablation and then potentially sotalol.  These were all discussed with her as well.  Heart catheterization 2017 reviewed and was reassuring    Chronic conditions affecting care: HTN    Labs ordered and results as above (interpreted by myself), CBC showed no significant concerning anemia, chemistry showed no significant concerning electrolyte derangements or acute renal failure, hepatic function panel showed no transaminitis to suggest hepatic dysfunction/inflammation, and troponin was either normal or close to baseline so lower suspicion for acute MI, myocarditis, pericarditis  Imaging interpreted and reviewed by myself:

## 2024-01-31 DIAGNOSIS — R10.32 ABDOMINAL PAIN, LLQ (LEFT LOWER QUADRANT): ICD-10-CM

## 2024-02-28 ENCOUNTER — HOSPITAL ENCOUNTER (EMERGENCY)
Age: 84
Discharge: HOME | End: 2024-02-28
Attending: STUDENT IN AN ORGANIZED HEALTH CARE EDUCATION/TRAINING PROGRAM
Payer: MEDICARE

## 2024-02-28 VITALS
TEMPERATURE: 98.1 F | OXYGEN SATURATION: 98 % | HEART RATE: 75 BPM | WEIGHT: 132 LBS | BODY MASS INDEX: 23.39 KG/M2 | RESPIRATION RATE: 18 BRPM | HEIGHT: 63 IN | SYSTOLIC BLOOD PRESSURE: 141 MMHG | DIASTOLIC BLOOD PRESSURE: 108 MMHG

## 2024-02-28 DIAGNOSIS — E86.0 DEHYDRATION: Primary | ICD-10-CM

## 2024-02-28 DIAGNOSIS — A08.4 VIRAL GASTROENTERITIS: ICD-10-CM

## 2024-02-28 LAB
ALBUMIN SERPL-MCNC: 4.3 GM/DL (ref 3.4–5)
ALP BLD-CCNC: 79 IU/L (ref 40–129)
ALT SERPL-CCNC: 22 U/L (ref 10–40)
ANION GAP SERPL CALCULATED.3IONS-SCNC: 14 MMOL/L (ref 7–16)
AST SERPL-CCNC: 29 IU/L (ref 15–37)
BASOPHILS ABSOLUTE: 0 K/CU MM
BASOPHILS RELATIVE PERCENT: 0.1 % (ref 0–1)
BILIRUB SERPL-MCNC: 0.7 MG/DL (ref 0–1)
BUN SERPL-MCNC: 23 MG/DL (ref 6–23)
CALCIUM SERPL-MCNC: 9 MG/DL (ref 8.3–10.6)
CHLORIDE BLD-SCNC: 101 MMOL/L (ref 99–110)
CO2: 21 MMOL/L (ref 21–32)
CREAT SERPL-MCNC: 0.9 MG/DL (ref 0.6–1.1)
DIFFERENTIAL TYPE: ABNORMAL
EOSINOPHILS ABSOLUTE: 0 K/CU MM
EOSINOPHILS RELATIVE PERCENT: 0 % (ref 0–3)
GFR SERPL CREATININE-BSD FRML MDRD: >60 ML/MIN/1.73M2
GLUCOSE SERPL-MCNC: 150 MG/DL (ref 70–99)
HCT VFR BLD CALC: 46.4 % (ref 37–47)
HEMOGLOBIN: 15.2 GM/DL (ref 12.5–16)
IMMATURE NEUTROPHIL %: 0.3 % (ref 0–0.43)
LACTIC ACID, SEPSIS: 1.2 MMOL/L (ref 0.4–2)
LACTIC ACID, SEPSIS: 2.1 MMOL/L (ref 0.4–2)
LIPASE: 31 IU/L (ref 13–60)
LYMPHOCYTES ABSOLUTE: 0.4 K/CU MM
LYMPHOCYTES RELATIVE PERCENT: 3.2 % (ref 24–44)
MCH RBC QN AUTO: 30 PG (ref 27–31)
MCHC RBC AUTO-ENTMCNC: 32.8 % (ref 32–36)
MCV RBC AUTO: 91.5 FL (ref 78–100)
MONOCYTES ABSOLUTE: 0.2 K/CU MM
MONOCYTES RELATIVE PERCENT: 1.3 % (ref 0–4)
PDW BLD-RTO: 14 % (ref 11.7–14.9)
PLATELET # BLD: 291 K/CU MM (ref 140–440)
PMV BLD AUTO: 9.9 FL (ref 7.5–11.1)
POTASSIUM SERPL-SCNC: 4.4 MMOL/L (ref 3.5–5.1)
RBC # BLD: 5.07 M/CU MM (ref 4.2–5.4)
SEGMENTED NEUTROPHILS ABSOLUTE COUNT: 11.5 K/CU MM
SEGMENTED NEUTROPHILS RELATIVE PERCENT: 95.1 % (ref 36–66)
SODIUM BLD-SCNC: 136 MMOL/L (ref 135–145)
TOTAL IMMATURE NEUTOROPHIL: 0.04 K/CU MM
TOTAL PROTEIN: 7.9 GM/DL (ref 6.4–8.2)
WBC # BLD: 12.1 K/CU MM (ref 4–10.5)

## 2024-02-28 PROCEDURE — 83690 ASSAY OF LIPASE: CPT

## 2024-02-28 PROCEDURE — 96361 HYDRATE IV INFUSION ADD-ON: CPT

## 2024-02-28 PROCEDURE — 96374 THER/PROPH/DIAG INJ IV PUSH: CPT

## 2024-02-28 PROCEDURE — 83605 ASSAY OF LACTIC ACID: CPT

## 2024-02-28 PROCEDURE — 85025 COMPLETE CBC W/AUTO DIFF WBC: CPT

## 2024-02-28 PROCEDURE — 6360000002 HC RX W HCPCS: Performed by: STUDENT IN AN ORGANIZED HEALTH CARE EDUCATION/TRAINING PROGRAM

## 2024-02-28 PROCEDURE — 80053 COMPREHEN METABOLIC PANEL: CPT

## 2024-02-28 PROCEDURE — 2580000003 HC RX 258: Performed by: STUDENT IN AN ORGANIZED HEALTH CARE EDUCATION/TRAINING PROGRAM

## 2024-02-28 PROCEDURE — 99284 EMERGENCY DEPT VISIT MOD MDM: CPT

## 2024-02-28 RX ORDER — ONDANSETRON 2 MG/ML
4 INJECTION INTRAMUSCULAR; INTRAVENOUS
Status: DISCONTINUED | OUTPATIENT
Start: 2024-02-28 | End: 2024-02-28 | Stop reason: HOSPADM

## 2024-02-28 RX ORDER — 0.9 % SODIUM CHLORIDE 0.9 %
1000 INTRAVENOUS SOLUTION INTRAVENOUS ONCE
Status: COMPLETED | OUTPATIENT
Start: 2024-02-28 | End: 2024-02-28

## 2024-02-28 RX ORDER — ONDANSETRON 4 MG/1
4 TABLET, ORALLY DISINTEGRATING ORAL
Qty: 6 TABLET | Refills: 0 | Status: SHIPPED | OUTPATIENT
Start: 2024-02-28

## 2024-02-28 RX ADMIN — ONDANSETRON 4 MG: 2 INJECTION INTRAMUSCULAR; INTRAVENOUS at 12:02

## 2024-02-28 RX ADMIN — SODIUM CHLORIDE 1000 ML: 9 INJECTION, SOLUTION INTRAVENOUS at 12:01

## 2024-02-28 ASSESSMENT — PAIN - FUNCTIONAL ASSESSMENT: PAIN_FUNCTIONAL_ASSESSMENT: 0-10

## 2024-02-28 ASSESSMENT — ENCOUNTER SYMPTOMS
COUGH: 0
VOMITING: 1
DIARRHEA: 1
CONSTIPATION: 0
ABDOMINAL PAIN: 1
NAUSEA: 1
SHORTNESS OF BREATH: 0
SORE THROAT: 0

## 2024-02-28 ASSESSMENT — PAIN SCALES - GENERAL: PAINLEVEL_OUTOF10: 0

## 2024-02-28 NOTE — ED NOTES
Discharge instructions reviewed with patient. Reviewed medications with patient. No additional questions asked.  Voiced understanding. Encouraged patient to follow up as discussed by the ED physician.Discussed the use of prescribed nausea medication. Encouraged patient to wait 20 to 30 minutes after taking medication before attempting to eat or drink. Recommended clear liquids only for the next 6 to 12 hours then slowly advance diet as tolerated. Patient voiced understanding. No additional questions asked.

## 2024-02-28 NOTE — ED PROVIDER NOTES
Pupils are equal, round, and reactive to light.   Cardiovascular:      Rate and Rhythm: Normal rate and regular rhythm.   Pulmonary:      Effort: Pulmonary effort is normal.      Breath sounds: Normal breath sounds.   Abdominal:      General: There is no distension.      Palpations: Abdomen is soft.      Tenderness: There is no abdominal tenderness.   Musculoskeletal:         General: Normal range of motion.      Cervical back: Normal range of motion and neck supple.   Skin:     General: Skin is warm and dry.   Neurological:      Mental Status: She is alert and oriented to person, place, and time.       ORDERS     PLAN (LABS / IMAGING / EKG):  Orders Placed This Encounter   Procedures    CMP    CBC with Auto Differential    Lipase    Lactate, Sepsis       MEDICATIONS ORDERED:  Orders Placed This Encounter   Medications    sodium chloride 0.9 % bolus 1,000 mL    DISCONTD: ondansetron (ZOFRAN) injection 4 mg    ondansetron (ZOFRAN-ODT) 4 MG disintegrating tablet     Sig: Take 1 tablet by mouth every 4-6 hours as needed for Nausea or Vomiting     Dispense:  6 tablet     Refill:  0         PROCEDURES     Procedures    DIAGNOSTIC RESULTS / EMERGENCY DEPARTMENT COURSE / MDM     LAB RESULTS:  Results for orders placed or performed during the hospital encounter of 02/28/24   CMP   Result Value Ref Range    Sodium 136 135 - 145 MMOL/L    Potassium 4.4 3.5 - 5.1 MMOL/L    Chloride 101 99 - 110 mMol/L    CO2 21 21 - 32 MMOL/L    Anion Gap 14 7 - 16    Glucose 150 (H) 70 - 99 MG/DL    BUN 23 6 - 23 MG/DL    Creatinine 0.9 0.6 - 1.1 MG/DL    Est, Glom Filt Rate >60 >60 mL/min/1.73m2    Calcium 9.0 8.3 - 10.6 MG/DL    Total Protein 7.9 6.4 - 8.2 GM/DL    Albumin 4.3 3.4 - 5.0 GM/DL    Total Bilirubin 0.7 0.0 - 1.0 MG/DL    Alkaline Phosphatase 79 40 - 129 IU/L    ALT 22 10 - 40 U/L    AST 29 15 - 37 IU/L   CBC with Auto Differential   Result Value Ref Range    WBC 12.1 (H) 4.0 - 10.5 K/CU MM    RBC 5.07 4.2 - 5.4 M/CU MM

## 2024-02-28 NOTE — DISCHARGE INSTRUCTIONS
Please see the attached instructions and return precautions.  I recommend you increase your oral intake of fluids with Gatorade Pedialyte, sports drinks.      If you would like to use the antidiarrheal agent I recommend loperamide which she can get over-the-counter.  Please talk to your local pharmacist.    I am prescribing you a few Zofran tablets to use as needed for nausea and vomiting.    For pain I recommend taking 1000 mg of Tylenol every 8 hours.    Please return the emergency department for any high fevers, severe abdominal pain, black or bloody stools, persistent nausea and vomiting, passing out, new worsening concerning complaints.    Please follow-up with your primary care physician or gastroenterologist as soon as possible for reevaluation.

## 2024-02-29 ENCOUNTER — CARE COORDINATION (OUTPATIENT)
Dept: CARE COORDINATION | Age: 84
End: 2024-02-29

## 2024-02-29 ENCOUNTER — TELEPHONE (OUTPATIENT)
Dept: INTERNAL MEDICINE CLINIC | Age: 84
End: 2024-02-29

## 2024-02-29 NOTE — TELEPHONE ENCOUNTER
I called patient and talk to her.  She is concerned Imodium because her previous is worse.  Told patient to take Pepto-Bismol and see if that helps her diarrhea.  Patient states she went to the emergency room yesterday with nausea vomiting and diarrhea.  Nausea and vomiting have improved she still has some diarrhea.  Encourage patient to take fluids.

## 2024-02-29 NOTE — CARE COORDINATION
LPN CC unable to reach patient for care coordination outreach s/p ED visit. Patient has been in contact with PCP & has f/u scheduled for 3/5.   Gianna Clancy LPN CC  Care Transitions  [phone]

## 2024-02-29 NOTE — TELEPHONE ENCOUNTER
Patient was given immodium at the hospital she does not want to take this precautions say not to take if you have irregular heart beat. Please advise

## 2024-03-01 ENCOUNTER — HOSPITAL ENCOUNTER (EMERGENCY)
Age: 84
Discharge: HOME | End: 2024-03-01
Attending: STUDENT IN AN ORGANIZED HEALTH CARE EDUCATION/TRAINING PROGRAM
Payer: MEDICARE

## 2024-03-01 VITALS
WEIGHT: 131 LBS | TEMPERATURE: 97.8 F | HEIGHT: 63 IN | BODY MASS INDEX: 23.21 KG/M2 | RESPIRATION RATE: 16 BRPM | SYSTOLIC BLOOD PRESSURE: 127 MMHG | OXYGEN SATURATION: 100 % | DIASTOLIC BLOOD PRESSURE: 55 MMHG | HEART RATE: 55 BPM

## 2024-03-01 DIAGNOSIS — R19.7 DIARRHEA, UNSPECIFIED TYPE: Primary | ICD-10-CM

## 2024-03-01 DIAGNOSIS — E87.1 HYPONATREMIA: ICD-10-CM

## 2024-03-01 LAB
ANION GAP SERPL CALCULATED.3IONS-SCNC: 12 MMOL/L (ref 7–16)
BASOPHILS ABSOLUTE: 0 K/CU MM
BASOPHILS RELATIVE PERCENT: 0.2 % (ref 0–1)
BUN SERPL-MCNC: 17 MG/DL (ref 6–23)
CALCIUM SERPL-MCNC: 8.2 MG/DL (ref 8.3–10.6)
CHLORIDE BLD-SCNC: 98 MMOL/L (ref 99–110)
CO2: 20 MMOL/L (ref 21–32)
CREAT SERPL-MCNC: 0.8 MG/DL (ref 0.6–1.1)
DIFFERENTIAL TYPE: ABNORMAL
EOSINOPHILS ABSOLUTE: 0.1 K/CU MM
EOSINOPHILS RELATIVE PERCENT: 1.6 % (ref 0–3)
GFR SERPL CREATININE-BSD FRML MDRD: >60 ML/MIN/1.73M2
GLUCOSE SERPL-MCNC: 75 MG/DL (ref 70–99)
HCT VFR BLD CALC: 34.8 % (ref 37–47)
HEMOGLOBIN: 11.7 GM/DL (ref 12.5–16)
IMMATURE NEUTROPHIL %: 0.2 % (ref 0–0.43)
LACTIC ACID, SEPSIS: 1.1 MMOL/L (ref 0.4–2)
LYMPHOCYTES ABSOLUTE: 1.7 K/CU MM
LYMPHOCYTES RELATIVE PERCENT: 36.9 % (ref 24–44)
MCH RBC QN AUTO: 29.9 PG (ref 27–31)
MCHC RBC AUTO-ENTMCNC: 33.6 % (ref 32–36)
MCV RBC AUTO: 89 FL (ref 78–100)
MONOCYTES ABSOLUTE: 0.5 K/CU MM
MONOCYTES RELATIVE PERCENT: 10.7 % (ref 0–4)
PDW BLD-RTO: 13.7 % (ref 11.7–14.9)
PLATELET # BLD: 177 K/CU MM (ref 140–440)
PMV BLD AUTO: 9.9 FL (ref 7.5–11.1)
POTASSIUM SERPL-SCNC: 3.9 MMOL/L (ref 3.5–5.1)
RBC # BLD: 3.91 M/CU MM (ref 4.2–5.4)
SEGMENTED NEUTROPHILS ABSOLUTE COUNT: 2.3 K/CU MM
SEGMENTED NEUTROPHILS RELATIVE PERCENT: 50.4 % (ref 36–66)
SODIUM BLD-SCNC: 130 MMOL/L (ref 135–145)
TOTAL IMMATURE NEUTOROPHIL: 0.01 K/CU MM
WBC # BLD: 4.5 K/CU MM (ref 4–10.5)

## 2024-03-01 PROCEDURE — 80048 BASIC METABOLIC PNL TOTAL CA: CPT

## 2024-03-01 PROCEDURE — 99284 EMERGENCY DEPT VISIT MOD MDM: CPT

## 2024-03-01 PROCEDURE — 83605 ASSAY OF LACTIC ACID: CPT

## 2024-03-01 PROCEDURE — 85025 COMPLETE CBC W/AUTO DIFF WBC: CPT

## 2024-03-01 PROCEDURE — 2580000003 HC RX 258: Performed by: STUDENT IN AN ORGANIZED HEALTH CARE EDUCATION/TRAINING PROGRAM

## 2024-03-01 RX ORDER — 0.9 % SODIUM CHLORIDE 0.9 %
1000 INTRAVENOUS SOLUTION INTRAVENOUS ONCE
Status: COMPLETED | OUTPATIENT
Start: 2024-03-01 | End: 2024-03-01

## 2024-03-01 RX ADMIN — SODIUM CHLORIDE 1000 ML: 9 INJECTION, SOLUTION INTRAVENOUS at 10:35

## 2024-03-01 ASSESSMENT — ENCOUNTER SYMPTOMS
NAUSEA: 0
COUGH: 0
ABDOMINAL PAIN: 0
SHORTNESS OF BREATH: 0
BLOOD IN STOOL: 0
DIARRHEA: 1
VOMITING: 0
SORE THROAT: 0

## 2024-03-01 ASSESSMENT — LIFESTYLE VARIABLES
HOW OFTEN DO YOU HAVE A DRINK CONTAINING ALCOHOL: MONTHLY OR LESS
HOW MANY STANDARD DRINKS CONTAINING ALCOHOL DO YOU HAVE ON A TYPICAL DAY: PATIENT DOES NOT DRINK

## 2024-03-01 ASSESSMENT — PAIN - FUNCTIONAL ASSESSMENT: PAIN_FUNCTIONAL_ASSESSMENT: NONE - DENIES PAIN

## 2024-03-01 ASSESSMENT — PAIN SCALES - GENERAL: PAINLEVEL_OUTOF10: 0

## 2024-03-01 NOTE — ED PROVIDER NOTES
DO Zeyad  Emergency Medicine    (Please note that portions of this note were completed with a voice recognition program.  Efforts were made to edit the dictations but occasionally words are mis-transcribed.)        Edward Jeffers DO  03/02/24 1741

## 2024-03-01 NOTE — DISCHARGE INSTRUCTIONS
Please see the attached instructions and return precautions.  Please follow-up with your primary care physician next few days for reevaluation.  Return the emergency department for passing out, severe abdominal pain, black or bloody stools, fevers, new worsening concerning complaints.

## 2024-03-01 NOTE — ED TRIAGE NOTES
Reports having diarrhea that started today around 0100. Says that about 0830 she had another episode of diarrhea. Denies blood. No fever when she checked yesterday.

## 2024-03-04 ENCOUNTER — TELEPHONE (OUTPATIENT)
Dept: INTERNAL MEDICINE CLINIC | Age: 84
End: 2024-03-04

## 2024-03-04 NOTE — TELEPHONE ENCOUNTER
Care Transitions Initial Follow Up Call    Call within 2 business days of discharge: Yes     Patient: Brenda Ye Patient : 1940 MRN: [id number]    [unfilled]    RARS: No data recorded     Spoke with: patient     Discharge department/facility: [facility]    Non-face-to-face services provided:  Scheduled with pcp    Follow Up  Future Appointments   Date Time Provider Department Center   3/5/2024  9:45 AM Dorie Velazquez MD urb rhc pc [facility]   2024 10:00 AM [facility] SPR ECHO [facility]   2024 10:00 AM Elier Armas MD [facility] Heart [facility]   2024  9:00 AM Lpn, Srmx [facility] urb [facility] pc [facility]   2024  9:20 AM Rosi Salmon APRN - CNP [facility]       Haily Duarte MA

## 2024-03-05 ENCOUNTER — OFFICE VISIT (OUTPATIENT)
Age: 84
End: 2024-03-05
Payer: MEDICARE

## 2024-03-05 VITALS
SYSTOLIC BLOOD PRESSURE: 128 MMHG | OXYGEN SATURATION: 99 % | TEMPERATURE: 97 F | DIASTOLIC BLOOD PRESSURE: 68 MMHG | HEART RATE: 58 BPM | BODY MASS INDEX: 23.06 KG/M2 | WEIGHT: 130.2 LBS | RESPIRATION RATE: 16 BRPM

## 2024-03-05 DIAGNOSIS — I49.3 PVCS (PREMATURE VENTRICULAR CONTRACTIONS): ICD-10-CM

## 2024-03-05 DIAGNOSIS — E03.9 ACQUIRED HYPOTHYROIDISM: ICD-10-CM

## 2024-03-05 DIAGNOSIS — I10 ESSENTIAL HYPERTENSION: ICD-10-CM

## 2024-03-05 DIAGNOSIS — N83.201 CYST OF RIGHT OVARY: ICD-10-CM

## 2024-03-05 DIAGNOSIS — I47.20 VENTRICULAR TACHYCARDIA (HCC): ICD-10-CM

## 2024-03-05 DIAGNOSIS — K21.9 GASTROESOPHAGEAL REFLUX DISEASE WITHOUT ESOPHAGITIS: ICD-10-CM

## 2024-03-05 DIAGNOSIS — N28.1 CYST OF LEFT KIDNEY: Primary | ICD-10-CM

## 2024-03-05 DIAGNOSIS — E78.2 MIXED HYPERLIPIDEMIA: ICD-10-CM

## 2024-03-05 DIAGNOSIS — R00.2 HEART PALPITATIONS: ICD-10-CM

## 2024-03-05 PROCEDURE — 1123F ACP DISCUSS/DSCN MKR DOCD: CPT | Performed by: INTERNAL MEDICINE

## 2024-03-05 PROCEDURE — G8420 CALC BMI NORM PARAMETERS: HCPCS | Performed by: INTERNAL MEDICINE

## 2024-03-05 PROCEDURE — 1090F PRES/ABSN URINE INCON ASSESS: CPT | Performed by: INTERNAL MEDICINE

## 2024-03-05 PROCEDURE — 99214 OFFICE O/P EST MOD 30 MIN: CPT | Performed by: INTERNAL MEDICINE

## 2024-03-05 PROCEDURE — 1036F TOBACCO NON-USER: CPT | Performed by: INTERNAL MEDICINE

## 2024-03-05 PROCEDURE — G8484 FLU IMMUNIZE NO ADMIN: HCPCS | Performed by: INTERNAL MEDICINE

## 2024-03-05 PROCEDURE — G8399 PT W/DXA RESULTS DOCUMENT: HCPCS | Performed by: INTERNAL MEDICINE

## 2024-03-05 PROCEDURE — G8427 DOCREV CUR MEDS BY ELIG CLIN: HCPCS | Performed by: INTERNAL MEDICINE

## 2024-03-05 PROCEDURE — 3074F SYST BP LT 130 MM HG: CPT | Performed by: INTERNAL MEDICINE

## 2024-03-05 PROCEDURE — 3078F DIAST BP <80 MM HG: CPT | Performed by: INTERNAL MEDICINE

## 2024-03-05 RX ORDER — DOCUSATE SODIUM 100 MG/1
100 CAPSULE, LIQUID FILLED ORAL DAILY PRN
Qty: 30 CAPSULE | Refills: 5 | Status: SHIPPED | OUTPATIENT
Start: 2024-03-05

## 2024-03-05 RX ORDER — ATORVASTATIN CALCIUM 10 MG/1
TABLET, FILM COATED ORAL
Qty: 90 TABLET | Refills: 1 | Status: SHIPPED | OUTPATIENT
Start: 2024-03-05

## 2024-03-05 RX ORDER — CICLOPIROX 80 MG/ML
SOLUTION TOPICAL
COMMUNITY
Start: 2024-02-13

## 2024-03-05 RX ORDER — TRIAMCINOLONE ACETONIDE 1 MG/G
CREAM TOPICAL
COMMUNITY
Start: 2024-01-30

## 2024-03-05 RX ORDER — BENAZEPRIL HYDROCHLORIDE 10 MG/1
10 TABLET ORAL DAILY
Qty: 90 TABLET | Refills: 1 | Status: SHIPPED | OUTPATIENT
Start: 2024-03-05

## 2024-03-05 RX ORDER — LEVOTHYROXINE SODIUM 0.05 MG/1
TABLET ORAL
Qty: 90 TABLET | Refills: 1 | Status: SHIPPED | OUTPATIENT
Start: 2024-03-05

## 2024-03-05 ASSESSMENT — PATIENT HEALTH QUESTIONNAIRE - PHQ9
2. FEELING DOWN, DEPRESSED OR HOPELESS: 1
SUM OF ALL RESPONSES TO PHQ QUESTIONS 1-9: 1
SUM OF ALL RESPONSES TO PHQ9 QUESTIONS 1 & 2: 1
1. LITTLE INTEREST OR PLEASURE IN DOING THINGS: 0

## 2024-03-08 ENCOUNTER — HOSPITAL ENCOUNTER (OUTPATIENT)
Dept: ULTRASOUND IMAGING | Age: 84
Discharge: HOME | End: 2024-03-08
Payer: MEDICARE

## 2024-03-08 DIAGNOSIS — N28.1 CYST OF LEFT KIDNEY: ICD-10-CM

## 2024-03-08 PROCEDURE — 76775 US EXAM ABDO BACK WALL LIM: CPT

## 2024-03-12 NOTE — ASSESSMENT & PLAN NOTE
: pt seen Dr Ludivina Zelaay  Not on any medications .
Dr. Lugo Code saw patient . Had colonosocpy 11/13 and SBFT   Pt takes bentyl prn. Has not used it for some time.
Exercise induced 8/1/17[facility] normal coronories  Referred to Dr Wang Deal.  Recommended EP study  He is planning to start her on some medication in hospital.   She will decide : but patient decide against it
Hypertension in control. Follow low salt diet. Continue current treatment.   On benzapril 10 mg daily
Patient on lipitor 10 mg daily  for hyperlipidemia  Lipid profile is good
Pt is taking synthroid 50 mcg daily
Detail Level: Simple
Initiate Treatment: Triamcinolone - Apply to lesion on the buttock twice daily x 2 weeks. Not safe for face, armpits or groin.

## 2024-04-26 ENCOUNTER — OFFICE VISIT (OUTPATIENT)
Dept: CARDIOLOGY CLINIC | Age: 84
End: 2024-04-26
Payer: MEDICARE

## 2024-04-26 VITALS
WEIGHT: 136.8 LBS | DIASTOLIC BLOOD PRESSURE: 64 MMHG | BODY MASS INDEX: 24.24 KG/M2 | HEART RATE: 55 BPM | HEIGHT: 63 IN | SYSTOLIC BLOOD PRESSURE: 124 MMHG

## 2024-04-26 DIAGNOSIS — R00.2 HEART PALPITATIONS: Primary | ICD-10-CM

## 2024-04-26 PROCEDURE — 93000 ELECTROCARDIOGRAM COMPLETE: CPT | Performed by: INTERNAL MEDICINE

## 2024-04-26 PROCEDURE — 1123F ACP DISCUSS/DSCN MKR DOCD: CPT | Performed by: INTERNAL MEDICINE

## 2024-04-26 PROCEDURE — 1090F PRES/ABSN URINE INCON ASSESS: CPT | Performed by: INTERNAL MEDICINE

## 2024-04-26 PROCEDURE — G8420 CALC BMI NORM PARAMETERS: HCPCS | Performed by: INTERNAL MEDICINE

## 2024-04-26 PROCEDURE — 99214 OFFICE O/P EST MOD 30 MIN: CPT | Performed by: INTERNAL MEDICINE

## 2024-04-26 PROCEDURE — G8399 PT W/DXA RESULTS DOCUMENT: HCPCS | Performed by: INTERNAL MEDICINE

## 2024-04-26 PROCEDURE — G8427 DOCREV CUR MEDS BY ELIG CLIN: HCPCS | Performed by: INTERNAL MEDICINE

## 2024-04-26 PROCEDURE — 3078F DIAST BP <80 MM HG: CPT | Performed by: INTERNAL MEDICINE

## 2024-04-26 PROCEDURE — 3074F SYST BP LT 130 MM HG: CPT | Performed by: INTERNAL MEDICINE

## 2024-04-26 PROCEDURE — 1036F TOBACCO NON-USER: CPT | Performed by: INTERNAL MEDICINE

## 2024-04-26 RX ORDER — DOXYCYCLINE HYCLATE 100 MG
100 TABLET ORAL DAILY
Qty: 7 TABLET | Refills: 0 | Status: SHIPPED | OUTPATIENT
Start: 2024-04-26 | End: 2024-05-03

## 2024-04-26 ASSESSMENT — ENCOUNTER SYMPTOMS
PHOTOPHOBIA: 0
SHORTNESS OF BREATH: 0
CHEST TIGHTNESS: 0
NAUSEA: 0
COUGH: 0
ABDOMINAL PAIN: 0
DIARRHEA: 0
WHEEZING: 0
BLOOD IN STOOL: 0
COLOR CHANGE: 1
VOMITING: 0
CONSTIPATION: 0
BACK PAIN: 0
EYE PAIN: 0

## 2024-04-26 NOTE — PROGRESS NOTES
Electrophysiology FU Note      Reason for consultation: PVC    Chief complaint :   palpitations, fall    Referring physician:        Primary care physician: Dorie Velazquez MD      History of Present Illness:     This visit (4/26/2024)      Chief Complaint   Patient presents with    6 Month Follow-Up     Patient c/o palpitations often.   Patient states she fell last week while being in garden and has an open sore on her RT shin.  She did not pass out. She slipped had a mechanical fall.  Patient c/o she has some abrasion on right leg and right arm, She has been using neosporin.  Patient denies use of tobacco.   Patient states she consumes \"1 beer\" in moderation.   Patient denies caffeine consumption.   Patient states she goes to the [facility] 3x weekly. Patient does not have any upcoming surgeries or procedures.           Previous visit:  (10/27/2023)      Chief Complaint   Patient presents with    6 Month Follow-Up     No new cardiac symptoms - Denies chest pain, shortness of breath, palpitations, syncope or presyncope, edema            Previous visit:  (4/21/2023)      Chief Complaint   Patient presents with    Results     Follow up for echo results.  Occ palp and edema bilat.     Palpitations    Edema           Previous visit: (4/7/2023)      Chief Complaint   Patient presents with    Palpitations     Pt can feel PVCs     Chest Pain     No chest pain, pt states that shoulder started aching this morning, ache went down arm into finger tips.            Previous visit:  (4/1/2022)      Chief Complaint   Patient presents with    Follow-up     Dr. Jacinto pt here for 4 month f/u, Patient was to think about Sotalol and patient decided she does not want to take it. Patient denies any cardiac complaints, Patient does not smoke and drinks alcohol. Patient does exercise.            Previous visit:(9/24/2021)      Chief Complaint   Patient presents with    3 Month Follow-Up

## 2024-04-27 ENCOUNTER — HOSPITAL ENCOUNTER (EMERGENCY)
Age: 84
Discharge: HOME | End: 2024-04-27
Attending: EMERGENCY MEDICINE
Payer: MEDICARE

## 2024-04-27 VITALS
RESPIRATION RATE: 15 BRPM | BODY MASS INDEX: 24.06 KG/M2 | WEIGHT: 135.8 LBS | SYSTOLIC BLOOD PRESSURE: 157 MMHG | DIASTOLIC BLOOD PRESSURE: 66 MMHG | OXYGEN SATURATION: 98 % | HEIGHT: 63 IN | TEMPERATURE: 98.2 F | HEART RATE: 63 BPM

## 2024-04-27 DIAGNOSIS — Z51.89 VISIT FOR WOUND CHECK: Primary | ICD-10-CM

## 2024-04-27 PROCEDURE — 99282 EMERGENCY DEPT VISIT SF MDM: CPT

## 2024-04-27 ASSESSMENT — PAIN - FUNCTIONAL ASSESSMENT: PAIN_FUNCTIONAL_ASSESSMENT: NONE - DENIES PAIN

## 2024-04-28 NOTE — DISCHARGE INSTR - COC
Continuity of Care Form    Patient Name: Brenda Ye   :  1940  MRN:  [id number]    Admit date:  2024  Discharge date:  ***    Code Status Order: Prior   Advance Directives:     Admitting Physician:  No admitting provider for patient encounter.  PCP: Dorie Velazquez MD    Discharging Nurse: ***  Discharging Hospital Unit/Room#:   Discharging Unit Phone Number: ***    Emergency Contact:   Extended Emergency Contact Information  Primary Emergency Contact: Matti Ye  Home Phone: [phone]  Relation: Child  Preferred language: English    Past Surgical History:  Past Surgical History:   Procedure Laterality Date    APPENDECTOMY      BREAST BIOPSY      CATARACT REMOVAL Bilateral     COLONOSCOPY  12/3/13,17 no need for follow up due to pt's advanced age per Dr. Troncoso.    DIAGNOSTIC CARDIAC CATH LAB PROCEDURE  2017    Normal coronaries, EF 70%.    HYSTERECTOMY (CERVIX STATUS UNKNOWN)      LIVER BIOPSY  10/09    steasosis    UPPER GASTROINTESTINAL ENDOSCOPY  2017    Dr. Troncoso       Immunization History:   Immunization History   Administered Date(s) Administered    COVID-19, PFIZER Bivalent, DO NOT Dilute, (age 12y+), IM, 30 mcg/0.3 mL 2022    COVID-19, PFIZER PURPLE top, DILUTE for use, (age 12 y+), 30mcg/0.3mL 2021, 2021, 10/26/2021    Influenza, FLUAD, (age 65 y+), Adjuvanted, 0.5mL 10/14/2020, 10/25/2023    Influenza, FLUZONE (age 65 y+), High Dose, 0.7mL 10/15/2021, 2022    Influenza, High Dose (Fluzone 65 yrs and older) 10/14/2015, 2016, 10/23/2018    Influenza, Triv, inactivated, subunit, adjuvanted, IM (Fluad 65 yrs and older) 2019    Pneumococcal, PCV-13, PREVNAR 13, (age 6w+), IM, 0.5mL 2017    Pneumococcal, PPSV23, PNEUMOVAX 23, (age 2y+), SC/IM, 0.5mL 2004, 2015    Zoster Live (Zostavax) 2014       Active Problems:  Patient Active Problem List   Diagnosis Code    Essential hypertension I10    Mixed

## 2024-04-28 NOTE — ED NOTES
The patient presents to the er today with complaints of a fall 2 weeks ago. She reports of some right knee pain. She reports of \" putting neosporin on the right knee.\"  When I asked if she had a cut on the knee she said \" I don't think so.\"  The patient was placed in a gown and she has redness and an abrasion below the right knee and an abrasion on the right forearm. The call light is within reach.

## 2024-04-28 NOTE — ED PROVIDER NOTES
Triage Chief Complaint:   Fall and Knee Pain (The patient reports of a fall 2 weeks ago has the complaint of right knee pain.    )    [place]:  Brenda Ye is a 83 y.o. female that presents for wound check.  The patient states that she had a fall 2 weeks ago and scraped her right forearm on her right shin.  States that she has been using Band-Aids, apply Neosporin to her right shin and was at [facility] to get some more supplies with the pharmacist told her she should come to the ER for evaluation.  She denies any fevers, difficulty ambulating.  No other acute complaints.    ROS:  At least 4 systems reviewed and otherwise acutely negative except as in the [place].    Past Medical History:   Diagnosis Date    Acute deep vein thrombosis (DVT) of distal vein of left lower extremity (HCC) 10/23/2016    DVT involving left peroneal, posterior and anterior tibial veins 10/16 Treated for at least 3 months and repeat venous doppler was negative and was discontinued     Axillary adenopathy 8/28/2014    US of axilla 6/14 showed benign lymph nodes    C. difficile diarrhea 11/18/2016    Treated few times and now is normal    CAD (coronary artery disease)     Chest discomfort 8/1/2017    Colitis 12/13    EGD,COLONOSCOPY, SBFT normal. Dr. Mckeon    Cyst of right ovary 9/6/2016    Seen Dr Leavitt and had several US per pt.    Dizzy spells 6/5/2017    H/O 24 hour EKG monitoring 7/27/09    NSR, few PVCs and occasional couplet noted (total 1007 PVCs), no pauses noted.     H/O colonoscopy 10/09, 12/13    f/u in 5 years    H/O echocardiogram 9/17/12    Normal LV size and function, mild mitral and tricuspid regurg., EF 60%.    H/O mitral valve prolapse 2003    MR mild    H/O myocardial perfusion scan 12/16/14    Stress Cardiolite.  Normal perfusion in the distribution of all coronaries, normal LV size and function, EF 70%.    H/O screening mammography 8/11    Dr Stevens    History of cardiac monitoring 06/05/2017    Abnormal-SR with complex

## 2024-04-30 ENCOUNTER — TELEPHONE (OUTPATIENT)
Age: 84
End: 2024-04-30

## 2024-04-30 NOTE — TELEPHONE ENCOUNTER
Care Transitions Initial Follow Up Call    Call within 2 business days of discharge: Yes     Patient: Brenda Ye Patient : 1940 MRN: [id number]    [unfilled]    RARS: No data recorded     Spoke with: patient on 2024      Discharge department/facility: [facility] ed    Non-face-to-face services provided:  Scheduled appointment with PCP-     Follow Up  Future Appointments   Date Time Provider Department Center   2024  1:45 PM Dorie Velazquez MD ur[facility] pc [facility]   2024 11:00 AM Dorie Velazquez MD [facility]   2024  9:00 AM Lpn, Srmx [facility]v [facility] pc [facility]   2024  9:20 AM Rosi Salmon APRN - [facility] Heart [facility]   10/29/2024  9:30 AM Lizeth Mathur APRN - [facility] Heart [facility]       Haily Duarte MA

## 2024-05-06 ENCOUNTER — OFFICE VISIT (OUTPATIENT)
Age: 84
End: 2024-05-06
Payer: MEDICARE

## 2024-05-06 VITALS
WEIGHT: 131.8 LBS | HEART RATE: 60 BPM | BODY MASS INDEX: 23.35 KG/M2 | SYSTOLIC BLOOD PRESSURE: 132 MMHG | OXYGEN SATURATION: 100 % | DIASTOLIC BLOOD PRESSURE: 72 MMHG | RESPIRATION RATE: 16 BRPM | TEMPERATURE: 97.1 F

## 2024-05-06 DIAGNOSIS — E78.2 MIXED HYPERLIPIDEMIA: ICD-10-CM

## 2024-05-06 DIAGNOSIS — S80.811D ABRASION OF RIGHT LOWER EXTREMITY, SUBSEQUENT ENCOUNTER: Primary | ICD-10-CM

## 2024-05-06 DIAGNOSIS — I10 ESSENTIAL HYPERTENSION: ICD-10-CM

## 2024-05-06 DIAGNOSIS — K76.89 LIVER DYSFUNCTION: ICD-10-CM

## 2024-05-06 DIAGNOSIS — E03.9 ACQUIRED HYPOTHYROIDISM: ICD-10-CM

## 2024-05-06 PROBLEM — S80.811A ABRASION OF RIGHT LEG: Status: ACTIVE | Noted: 2024-05-06

## 2024-05-06 PROCEDURE — 1036F TOBACCO NON-USER: CPT | Performed by: INTERNAL MEDICINE

## 2024-05-06 PROCEDURE — G8399 PT W/DXA RESULTS DOCUMENT: HCPCS | Performed by: INTERNAL MEDICINE

## 2024-05-06 PROCEDURE — 1123F ACP DISCUSS/DSCN MKR DOCD: CPT | Performed by: INTERNAL MEDICINE

## 2024-05-06 PROCEDURE — 3075F SYST BP GE 130 - 139MM HG: CPT | Performed by: INTERNAL MEDICINE

## 2024-05-06 PROCEDURE — 99213 OFFICE O/P EST LOW 20 MIN: CPT | Performed by: INTERNAL MEDICINE

## 2024-05-06 PROCEDURE — 1090F PRES/ABSN URINE INCON ASSESS: CPT | Performed by: INTERNAL MEDICINE

## 2024-05-06 PROCEDURE — G8420 CALC BMI NORM PARAMETERS: HCPCS | Performed by: INTERNAL MEDICINE

## 2024-05-06 PROCEDURE — G8427 DOCREV CUR MEDS BY ELIG CLIN: HCPCS | Performed by: INTERNAL MEDICINE

## 2024-05-06 PROCEDURE — 3078F DIAST BP <80 MM HG: CPT | Performed by: INTERNAL MEDICINE

## 2024-05-06 NOTE — PROGRESS NOTES
Brenda Ye  Patient's  is 1940  Seen in office on 2024      SUBJECTIVE:  Brenda brink 83 y.o.year old female presents today   Chief Complaint   Patient presents with    Follow-up     ED visit 24, fell pulling a limb and had skin abrasion on right lower leg and right forearm.    Constipation     \"Feels like bowel has dropped down\"  Saw Dr Mckeon and all he said was \"I do not do colonoscopies on 83yr old people\" never addressed the constipation complaint except for Miralax once daily. Patient not really wanting to go back to him.     Bladder Problem     Bladder prolapsed     Pt states she fell at home when she wooded area on side of the house. She was trying to pull a limb of tree when she lost balance and fell bruising right shin and right forearm post   She cleaned the wound and used OTC antibiotic  and went to ER a week later  Nothing done in ER . It is getting better    Pt has constipation and saw Dr Mckeon. He felt no need for colonoscopy , advised to take metamcul along with colace    Pt feels her bladder is prolapsed    Pt has seen Dr Cruz. He is going to US and has appt with him in 2024  Taking medications regularly. No side effects noted.    Review of Systems    OBJECTIVE: /72   Pulse 60   Temp 97.1 °F (36.2 °C) (Temporal)   Resp 16   Wt 59.8 kg (131 lb 12.8 oz)   SpO2 100%   BMI 23.35 kg/m²     Wt Readings from Last 3 Encounters:   24 59.8 kg (131 lb 12.8 oz)   24 61.6 kg (135 lb 12.8 oz)   24 62.1 kg (136 lb 12.8 oz)      GENERAL: - Alert, oriented, pleasant, in no apparent distress.    HEENT: - Conjunctiva pink, no scleral icterus. ENT clear.  NECK: -Supple.  No jugular venous distention noted. No masses felt,  CARDIOVASCULAR: - Normal S1 and S2    PULMONARY: - No respiratory distress.  No wheezes or rales.    ABDOMEN: - Soft and non-tender,no masses  ororganomegaly.  EXTREMITIES: - No cyanosis, clubbing, or significant edema.  SKIN: Skin is warm and dry.

## 2024-06-03 ENCOUNTER — HOSPITAL ENCOUNTER (OUTPATIENT)
Age: 84
Discharge: HOME | End: 2024-06-03
Payer: MEDICARE

## 2024-06-03 DIAGNOSIS — E03.9 ACQUIRED HYPOTHYROIDISM: ICD-10-CM

## 2024-06-03 DIAGNOSIS — I10 ESSENTIAL HYPERTENSION: ICD-10-CM

## 2024-06-03 DIAGNOSIS — E78.2 MIXED HYPERLIPIDEMIA: ICD-10-CM

## 2024-06-03 LAB
ALBUMIN SERPL-MCNC: 4.1 GM/DL (ref 3.4–5)
ALP BLD-CCNC: 92 IU/L (ref 40–129)
ALT SERPL-CCNC: 21 U/L (ref 10–40)
ANION GAP SERPL CALCULATED.3IONS-SCNC: 10 MMOL/L (ref 7–16)
AST SERPL-CCNC: 28 IU/L (ref 15–37)
BASOPHILS ABSOLUTE: 0.1 K/CU MM
BASOPHILS RELATIVE PERCENT: 0.9 % (ref 0–1)
BILIRUB SERPL-MCNC: 0.3 MG/DL (ref 0–1)
BUN SERPL-MCNC: 21 MG/DL (ref 6–23)
CALCIUM SERPL-MCNC: 9.6 MG/DL (ref 8.3–10.6)
CHLORIDE BLD-SCNC: 103 MMOL/L (ref 99–110)
CHOLESTEROL, FASTING: 177 MG/DL
CO2: 25 MMOL/L (ref 21–32)
CREAT SERPL-MCNC: 0.9 MG/DL (ref 0.6–1.1)
DIFFERENTIAL TYPE: ABNORMAL
EOSINOPHILS ABSOLUTE: 0.2 K/CU MM
EOSINOPHILS RELATIVE PERCENT: 2.7 % (ref 0–3)
GFR, ESTIMATED: 63 ML/MIN/1.73M2
GLUCOSE SERPL-MCNC: 101 MG/DL (ref 70–99)
HCT VFR BLD CALC: 41.9 % (ref 37–47)
HDLC SERPL-MCNC: 72 MG/DL
HEMOGLOBIN: 13.8 GM/DL (ref 12.5–16)
IMMATURE NEUTROPHIL %: 0.2 % (ref 0–0.43)
LDLC SERPL CALC-MCNC: 93 MG/DL
LYMPHOCYTES ABSOLUTE: 2.4 K/CU MM
LYMPHOCYTES RELATIVE PERCENT: 44 % (ref 24–44)
MCH RBC QN AUTO: 30.5 PG (ref 27–31)
MCHC RBC AUTO-ENTMCNC: 32.9 % (ref 32–36)
MCV RBC AUTO: 92.5 FL (ref 78–100)
MONOCYTES ABSOLUTE: 0.4 K/CU MM
MONOCYTES RELATIVE PERCENT: 7.1 % (ref 0–4)
NEUTROPHILS ABSOLUTE: 2.5 K/CU MM
NEUTROPHILS RELATIVE PERCENT: 45.1 % (ref 36–66)
PDW BLD-RTO: 13.1 % (ref 11.7–14.9)
PLATELET # BLD: 211 K/CU MM (ref 140–440)
PMV BLD AUTO: 10.4 FL (ref 7.5–11.1)
POTASSIUM SERPL-SCNC: 5 MMOL/L (ref 3.5–5.1)
RBC # BLD: 4.53 M/CU MM (ref 4.2–5.4)
SODIUM BLD-SCNC: 138 MMOL/L (ref 135–145)
TOTAL IMMATURE NEUTOROPHIL: 0.01 K/CU MM
TOTAL PROTEIN: 7.1 GM/DL (ref 6.4–8.2)
TRIGLYCERIDE, FASTING: 61 MG/DL
TSH SERPL DL<=0.005 MIU/L-ACNC: 2.46 UIU/ML (ref 0.27–4.2)
WBC # BLD: 5.5 K/CU MM (ref 4–10.5)

## 2024-06-03 PROCEDURE — 84443 ASSAY THYROID STIM HORMONE: CPT

## 2024-06-03 PROCEDURE — 80061 LIPID PANEL: CPT

## 2024-06-03 PROCEDURE — 85025 COMPLETE CBC W/AUTO DIFF WBC: CPT

## 2024-06-03 PROCEDURE — 80053 COMPREHEN METABOLIC PANEL: CPT

## 2024-06-03 PROCEDURE — 36415 COLL VENOUS BLD VENIPUNCTURE: CPT

## 2024-06-04 ENCOUNTER — TELEPHONE (OUTPATIENT)
Age: 84
End: 2024-06-04

## 2024-06-04 ENCOUNTER — OFFICE VISIT (OUTPATIENT)
Age: 84
End: 2024-06-04
Payer: MEDICARE

## 2024-06-04 VITALS
HEART RATE: 56 BPM | BODY MASS INDEX: 23.38 KG/M2 | WEIGHT: 132 LBS | RESPIRATION RATE: 16 BRPM | OXYGEN SATURATION: 98 % | SYSTOLIC BLOOD PRESSURE: 120 MMHG | TEMPERATURE: 98 F | DIASTOLIC BLOOD PRESSURE: 60 MMHG

## 2024-06-04 DIAGNOSIS — E03.9 ACQUIRED HYPOTHYROIDISM: ICD-10-CM

## 2024-06-04 DIAGNOSIS — I49.3 PVCS (PREMATURE VENTRICULAR CONTRACTIONS): ICD-10-CM

## 2024-06-04 DIAGNOSIS — I10 ESSENTIAL HYPERTENSION: Primary | ICD-10-CM

## 2024-06-04 DIAGNOSIS — K21.9 GASTROESOPHAGEAL REFLUX DISEASE WITHOUT ESOPHAGITIS: ICD-10-CM

## 2024-06-04 DIAGNOSIS — E78.2 MIXED HYPERLIPIDEMIA: ICD-10-CM

## 2024-06-04 DIAGNOSIS — K76.89 LIVER DYSFUNCTION: ICD-10-CM

## 2024-06-04 DIAGNOSIS — I47.20 VENTRICULAR TACHYCARDIA (HCC): ICD-10-CM

## 2024-06-04 DIAGNOSIS — M81.0 AGE-RELATED OSTEOPOROSIS WITHOUT CURRENT PATHOLOGICAL FRACTURE: ICD-10-CM

## 2024-06-04 DIAGNOSIS — D03.62 MELANOMA IN SITU OF LEFT UPPER EXTREMITY INCLUDING SHOULDER (HCC): ICD-10-CM

## 2024-06-04 DIAGNOSIS — N83.201 CYST OF RIGHT OVARY: ICD-10-CM

## 2024-06-04 PROBLEM — S80.811A ABRASION OF RIGHT LEG: Status: RESOLVED | Noted: 2024-05-06 | Resolved: 2024-06-04

## 2024-06-04 PROCEDURE — 1123F ACP DISCUSS/DSCN MKR DOCD: CPT | Performed by: INTERNAL MEDICINE

## 2024-06-04 PROCEDURE — 99214 OFFICE O/P EST MOD 30 MIN: CPT | Performed by: INTERNAL MEDICINE

## 2024-06-04 PROCEDURE — 1036F TOBACCO NON-USER: CPT | Performed by: INTERNAL MEDICINE

## 2024-06-04 PROCEDURE — 93005 ELECTROCARDIOGRAM TRACING: CPT | Performed by: INTERNAL MEDICINE

## 2024-06-04 PROCEDURE — 93010 ELECTROCARDIOGRAM REPORT: CPT | Performed by: INTERNAL MEDICINE

## 2024-06-04 PROCEDURE — G8420 CALC BMI NORM PARAMETERS: HCPCS | Performed by: INTERNAL MEDICINE

## 2024-06-04 PROCEDURE — G8399 PT W/DXA RESULTS DOCUMENT: HCPCS | Performed by: INTERNAL MEDICINE

## 2024-06-04 PROCEDURE — 1090F PRES/ABSN URINE INCON ASSESS: CPT | Performed by: INTERNAL MEDICINE

## 2024-06-04 PROCEDURE — G8427 DOCREV CUR MEDS BY ELIG CLIN: HCPCS | Performed by: INTERNAL MEDICINE

## 2024-06-04 PROCEDURE — 3074F SYST BP LT 130 MM HG: CPT | Performed by: INTERNAL MEDICINE

## 2024-06-04 PROCEDURE — 3078F DIAST BP <80 MM HG: CPT | Performed by: INTERNAL MEDICINE

## 2024-06-04 RX ORDER — DOCUSATE SODIUM 100 MG/1
100 CAPSULE, LIQUID FILLED ORAL DAILY PRN
Qty: 30 CAPSULE | Refills: 5 | Status: CANCELLED | OUTPATIENT
Start: 2024-06-04

## 2024-06-04 RX ORDER — DOCUSATE SODIUM 100 MG/1
100 CAPSULE, LIQUID FILLED ORAL DAILY PRN
Qty: 30 CAPSULE | Refills: 5 | Status: SHIPPED | OUTPATIENT
Start: 2024-06-04

## 2024-06-04 SDOH — ECONOMIC STABILITY: INCOME INSECURITY: HOW HARD IS IT FOR YOU TO PAY FOR THE VERY BASICS LIKE FOOD, HOUSING, MEDICAL CARE, AND HEATING?: NOT VERY HARD

## 2024-06-04 SDOH — ECONOMIC STABILITY: FOOD INSECURITY: WITHIN THE PAST 12 MONTHS, THE FOOD YOU BOUGHT JUST DIDN'T LAST AND YOU DIDN'T HAVE MONEY TO GET MORE.: NEVER TRUE

## 2024-06-04 SDOH — ECONOMIC STABILITY: FOOD INSECURITY: WITHIN THE PAST 12 MONTHS, YOU WORRIED THAT YOUR FOOD WOULD RUN OUT BEFORE YOU GOT MONEY TO BUY MORE.: NEVER TRUE

## 2024-06-04 ASSESSMENT — PATIENT HEALTH QUESTIONNAIRE - PHQ9
2. FEELING DOWN, DEPRESSED OR HOPELESS: NOT AT ALL
SUM OF ALL RESPONSES TO PHQ QUESTIONS 1-9: 0
SUM OF ALL RESPONSES TO PHQ QUESTIONS 1-9: 0
SUM OF ALL RESPONSES TO PHQ9 QUESTIONS 1 & 2: 0
SUM OF ALL RESPONSES TO PHQ QUESTIONS 1-9: 0
SUM OF ALL RESPONSES TO PHQ QUESTIONS 1-9: 0
1. LITTLE INTEREST OR PLEASURE IN DOING THINGS: NOT AT ALL

## 2024-06-04 NOTE — PROGRESS NOTES
Brenda Ye  Patient's  is 1940  Seen in office on 2024      SUBJECTIVE:  Brenda brink 83 y.o.year old female presents today   Chief Complaint   Patient presents with    3 Month Follow-Up    Results     Lab review    Medication Refill     Pt states her BM are regular now  Taking miralax or stool softner  No abdominal pain  No chest pain , no SOB  Lab results reviewed  all normal range   CBC , CMP, Lipid and TSH are normal or acceptable range     Pt had Dexa scan : on 24 :  She has osteoporosis and is on calcium and vitamin D   Patient has hypertension. Taking medications No headaches, no chest pain, no palpitations and no dizziness.  Patient has history of PVCs and V. tach in the past.  Denies any dizziness.  She does complain of some fatigue but no palpitations.  No syncope or near syncope.      Taking medications regularly. No side effects noted.    Review of Systems    OBJECTIVE: /60   Pulse 56   Temp 98 °F (36.7 °C) (Temporal)   Resp 16   Wt 59.9 kg (132 lb)   SpO2 98%   BMI 23.38 kg/m²     Wt Readings from Last 3 Encounters:   24 59.9 kg (132 lb)   24 59.8 kg (131 lb 12.8 oz)   24 61.6 kg (135 lb 12.8 oz)      GENERAL: - Alert, oriented, pleasant, in no apparent distress.    HEENT: - Conjunctiva pink, no scleral icterus. ENT clear.  NECK: -Supple.  No jugular venous distention noted. No masses felt,  CARDIOVASCULAR: - Normal S1 and S2    PULMONARY: - No respiratory distress.  No wheezes or rales.    ABDOMEN: - Soft and non-tender,no masses  ororganomegaly.  EXTREMITIES: - No cyanosis, clubbing, or significant edema.  SKIN: Skin is warm and dry.   NEUROLOGICAL: - Cranial nerves II through XII are grossly intact.      IMPRESSION:    Encounter Diagnoses   Name Primary?    Essential hypertension Yes    Acquired hypothyroidism     Age-related osteoporosis without current pathological fracture     Gastroesophageal reflux disease without esophagitis     Cyst of right ovary

## 2024-06-13 ENCOUNTER — TELEPHONE (OUTPATIENT)
Age: 84
End: 2024-06-13

## 2024-06-13 NOTE — TELEPHONE ENCOUNTER
Fax received from [facility] for dexa scan - scanned in media - [facility] sent fax wanting you to treat patient for osteoporosis per WHO criteria

## 2024-06-21 ENCOUNTER — TELEMEDICINE (OUTPATIENT)
Age: 84
End: 2024-06-21

## 2024-06-21 DIAGNOSIS — Z00.00 MEDICARE ANNUAL WELLNESS VISIT, SUBSEQUENT: Primary | ICD-10-CM

## 2024-06-21 ASSESSMENT — PATIENT HEALTH QUESTIONNAIRE - PHQ9
SUM OF ALL RESPONSES TO PHQ QUESTIONS 1-9: 0
1. LITTLE INTEREST OR PLEASURE IN DOING THINGS: NOT AT ALL
2. FEELING DOWN, DEPRESSED OR HOPELESS: NOT AT ALL
SUM OF ALL RESPONSES TO PHQ QUESTIONS 1-9: 0
SUM OF ALL RESPONSES TO PHQ QUESTIONS 1-9: 0
SUM OF ALL RESPONSES TO PHQ9 QUESTIONS 1 & 2: 0
SUM OF ALL RESPONSES TO PHQ QUESTIONS 1-9: 0

## 2024-06-21 ASSESSMENT — LIFESTYLE VARIABLES
HOW MANY STANDARD DRINKS CONTAINING ALCOHOL DO YOU HAVE ON A TYPICAL DAY: 1 OR 2
HOW OFTEN DO YOU HAVE A DRINK CONTAINING ALCOHOL: 2-4 TIMES A MONTH

## 2024-06-21 NOTE — PATIENT INSTRUCTIONS
Personalized Preventive Plan for Brenda Ye - 6/21/2024  Medicare offers a range of preventive health benefits. Some of the tests and screenings are paid in full while other may be subject to a deductible, co-insurance, and/or copay.    Some of these benefits include a comprehensive review of your medical history including lifestyle, illnesses that may run in your family, and various assessments and screenings as appropriate.    After reviewing your medical record and screening and assessments performed today your provider may have ordered immunizations, labs, imaging, and/or referrals for you.  A list of these orders (if applicable) as well as your Preventive Care list are included within your After Visit Summary for your review.    Other Preventive Recommendations:    A preventive eye exam performed by an eye specialist is recommended every 1-2 years to screen for glaucoma; cataracts, macular degeneration, and other eye disorders.  A preventive dental visit is recommended every 6 months.  Try to get at least 150 minutes of exercise per week or 10,000 steps per day on a pedometer .  Order or download the FREE \"Exercise & Physical Activity: Your Everyday Guide\" from [employer]. Call 1-336.605.3378 or search [employer] online.  You need 6935-0233 mg of calcium and 0098-3772 IU of vitamin D per day. It is possible to meet your calcium requirement with diet alone, but a vitamin D supplement is usually necessary to meet this goal.  When exposed to the sun, use a sunscreen that protects against both UVA and UVB radiation with an SPF of 30 or greater. Reapply every 2 to 3 hours or after sweating, drying off with a towel, or swimming.  Always wear a seat belt when traveling in a car. Always wear a helmet when riding a bicycle or motorcycle.

## 2024-06-21 NOTE — PROGRESS NOTES
Medicare Annual Wellness Visit    Brenda Ye is here for Medicare AWV    Assessment & Plan   Medicare annual wellness visit, subsequent  Recommendations for Preventive Services Due: see orders and patient instructions/AVS.  Recommended screening schedule for the next 5-10 years is provided to the patient in written form: see Patient Instructions/AVS.     No follow-ups on file.     Subjective       Patient's complete Health Risk Assessment and screening values have been reviewed and are found in Flowsheets. The following problems were reviewed today and where indicated follow up appointments were made and/or referrals ordered.    Positive Risk Factor Screenings with Interventions:    Fall Risk:  Do you feel unsteady or are you worried about falling? : (!) yes (has fear of falling, states she is stable)  2 or more falls in past year?: no  Fall with injury in past year?: (!) yes (fell doing yard work, but no serious injury)     Interventions:    Patient comments: states she did fall when doing yard work, but denies injuries, besides scrapes/scratches. She does have a fear of falling, but, states she is stable.  Reviewed medications, home hazards, visual acuity, and co-morbidities that can increase risk for falls  Patient declines any further evaluation or treatment            General HRA Questions:  Select all that apply: (!) Stress (family issues)    Stress Interventions:  Patient comments: states family issues.  Patient declined any further interventions or treatment       Dentist Screen:  Have you seen the dentist within the past year?: (!) No (needs)    Intervention:  Advised to schedule with their dentist      Safety:  Do you have any tripping hazards - loose or unsecured carpets or rugs?: (!) Yes  Interventions:  Patient comments: we discussed putting a rubber backing under her rugs, or replacing them with those that have a rubber backing and remain stationary.     Advanced Directives:  Do you have a Living

## 2024-07-08 ENCOUNTER — APPOINTMENT (OUTPATIENT)
Dept: CT IMAGING | Age: 84
End: 2024-07-08
Payer: MEDICARE

## 2024-07-08 ENCOUNTER — HOSPITAL ENCOUNTER (EMERGENCY)
Age: 84
Discharge: HOME | End: 2024-07-08
Payer: MEDICARE

## 2024-07-08 VITALS
TEMPERATURE: 98.3 F | WEIGHT: 132 LBS | BODY MASS INDEX: 23.38 KG/M2 | DIASTOLIC BLOOD PRESSURE: 72 MMHG | RESPIRATION RATE: 15 BRPM | OXYGEN SATURATION: 98 % | HEART RATE: 54 BPM | SYSTOLIC BLOOD PRESSURE: 148 MMHG

## 2024-07-08 DIAGNOSIS — R10.9 ABDOMINAL PAIN, UNSPECIFIED ABDOMINAL LOCATION: ICD-10-CM

## 2024-07-08 DIAGNOSIS — N83.8 OVARIAN MASS: Primary | ICD-10-CM

## 2024-07-08 LAB
ALBUMIN SERPL-MCNC: 4.3 GM/DL (ref 3.4–5)
ALP BLD-CCNC: 70 IU/L (ref 40–128)
ALT SERPL-CCNC: 21 U/L (ref 10–40)
ANION GAP SERPL CALCULATED.3IONS-SCNC: 11 MMOL/L (ref 7–16)
AST SERPL-CCNC: 31 IU/L (ref 15–37)
BACTERIA: NEGATIVE /HPF
BASOPHILS ABSOLUTE: 0 K/CU MM
BASOPHILS RELATIVE PERCENT: 0.5 % (ref 0–1)
BILIRUB SERPL-MCNC: 0.5 MG/DL (ref 0–1)
BILIRUBIN, URINE: NEGATIVE MG/DL
BLOOD, URINE: NEGATIVE
BUN SERPL-MCNC: 19 MG/DL (ref 6–23)
CALCIUM SERPL-MCNC: 9.9 MG/DL (ref 8.3–10.6)
CHLORIDE BLD-SCNC: 100 MMOL/L (ref 99–110)
CLARITY, UA: CLEAR
CO2: 25 MMOL/L (ref 21–32)
COLOR, UA: YELLOW
CREAT SERPL-MCNC: 0.9 MG/DL (ref 0.6–1.1)
DIFFERENTIAL TYPE: ABNORMAL
EOSINOPHILS ABSOLUTE: 0.1 K/CU MM
EOSINOPHILS RELATIVE PERCENT: 0.8 % (ref 0–3)
GFR, ESTIMATED: 63 ML/MIN/1.73M2
GLUCOSE SERPL-MCNC: 96 MG/DL (ref 70–99)
GLUCOSE URINE: NEGATIVE MG/DL
HCT VFR BLD CALC: 42.6 % (ref 37–47)
HEMOGLOBIN: 14.1 GM/DL (ref 12.5–16)
HYALINE CASTS: 0 /LPF
IMMATURE NEUTROPHIL %: 0.3 % (ref 0–0.43)
KETONES, URINE: NEGATIVE MG/DL
LACTATE: 0.9 MMOL/L (ref 0.5–1.9)
LEUKOCYTE ESTERASE, URINE: ABNORMAL
LYMPHOCYTES ABSOLUTE: 2.7 K/CU MM
LYMPHOCYTES RELATIVE PERCENT: 35.8 % (ref 24–44)
MCH RBC QN AUTO: 30.3 PG (ref 27–31)
MCHC RBC AUTO-ENTMCNC: 33.1 % (ref 32–36)
MCV RBC AUTO: 91.4 FL (ref 78–100)
MONOCYTES ABSOLUTE: 0.4 K/CU MM
MONOCYTES RELATIVE PERCENT: 5.9 % (ref 0–4)
MUCUS: ABNORMAL HPF
NEUTROPHILS ABSOLUTE: 4.3 K/CU MM
NEUTROPHILS RELATIVE PERCENT: 56.7 % (ref 36–66)
NITRITE URINE, QUANTITATIVE: NEGATIVE
NON SQUAM EPI CELLS: <1 /HPF
NUCLEATED RBC %: 0 %
PDW BLD-RTO: 12.5 % (ref 11.7–14.9)
PH, URINE: 6 (ref 5–8)
PLATELET # BLD: 187 K/CU MM (ref 140–440)
PMV BLD AUTO: 10.9 FL (ref 7.5–11.1)
POTASSIUM SERPL-SCNC: 4.3 MMOL/L (ref 3.5–5.1)
PROTEIN UA: NEGATIVE MG/DL
RBC # BLD: 4.66 M/CU MM (ref 4.2–5.4)
RBC URINE: 0 /HPF (ref 0–6)
SODIUM BLD-SCNC: 136 MMOL/L (ref 135–145)
SPECIFIC GRAVITY UA: <1.005 (ref 1–1.03)
SQUAMOUS EPITHELIAL: 1 /HPF
TOTAL IMMATURE NEUTOROPHIL: 0.02 K/CU MM
TOTAL NUCLEATED RBC: 0 K/CU MM
TOTAL PROTEIN: 8 GM/DL (ref 6.4–8.2)
TRICHOMONAS: ABNORMAL /HPF
UROBILINOGEN, URINE: 0.2 MG/DL (ref 0.2–1)
WBC # BLD: 7.5 K/CU MM (ref 4–10.5)
WBC UA: 2 /HPF (ref 0–5)

## 2024-07-08 PROCEDURE — 6360000004 HC RX CONTRAST MEDICATION: Performed by: NURSE PRACTITIONER

## 2024-07-08 PROCEDURE — 85025 COMPLETE CBC W/AUTO DIFF WBC: CPT

## 2024-07-08 PROCEDURE — 86304 IMMUNOASSAY TUMOR CA 125: CPT

## 2024-07-08 PROCEDURE — 83605 ASSAY OF LACTIC ACID: CPT

## 2024-07-08 PROCEDURE — 99285 EMERGENCY DEPT VISIT HI MDM: CPT

## 2024-07-08 PROCEDURE — 74177 CT ABD & PELVIS W/CONTRAST: CPT

## 2024-07-08 PROCEDURE — 81001 URINALYSIS AUTO W/SCOPE: CPT

## 2024-07-08 PROCEDURE — 80053 COMPREHEN METABOLIC PANEL: CPT

## 2024-07-08 RX ADMIN — IOPAMIDOL 80 ML: 755 INJECTION, SOLUTION INTRAVENOUS at 12:53

## 2024-07-08 NOTE — ED PROVIDER NOTES
[facility] EMERGENCY DEPARTMENT  EMERGENCY DEPARTMENT ENCOUNTER        Pt Name: Brenda Ye  MRN: [id number]  Birthdate 1940  Date of evaluation: 7/8/2024  Provider: JESSENIA MORENO - CNP  PCP: Dorie Velazquez MD    MIC. I have evaluated this patient.        Triage CHIEF COMPLAINT       Chief Complaint   Patient presents with    Lower Abdominal Pain         HISTORY OF PRESENT ILLNESS      Chief Complaint: lower abdominal pain    Brenda Ye is a 83 y.o. female who presents for evaluation of lower abdominal pain for a couple of months.  She has been dealing with chronic constipation, taking stool softeners and miralax.  Last BM was small 3 days ago.  She reports increasing pain the last couple of days.  Denies fevers, N/V, urinary symptoms.  Has had partial hysterectomy, appendectomy.  She does have a prolapsed bladder which has been chronic.  No history of diverticulitis.      Nursing Notes were all reviewed and agreed with or any disagreements were addressed in the HPI.    REVIEW OF SYSTEMS     Pertinent ROS as noted in HPI.      PAST MEDICAL HISTORY     Past Medical History:   Diagnosis Date    Acute deep vein thrombosis (DVT) of distal vein of left lower extremity (HCC) 10/23/2016    DVT involving left peroneal, posterior and anterior tibial veins 10/16 Treated for at least 3 months and repeat venous doppler was negative and was discontinued     Axillary adenopathy 8/28/2014    US of axilla 6/14 showed benign lymph nodes    C. difficile diarrhea 11/18/2016    Treated few times and now is normal    CAD (coronary artery disease)     Chest discomfort 8/1/2017    Colitis 12/13    EGD,COLONOSCOPY, SBFT normal. Dr. Mckeon    Cyst of right ovary 9/6/2016    Seen Dr Leavitt and had several US per pt.    Dizzy spells 6/5/2017    H/O 24 hour EKG monitoring 7/27/09    NSR, few PVCs and occasional couplet noted (total 1007 PVCs), no pauses noted.     H/O colonoscopy

## 2024-07-08 NOTE — DISCHARGE INSTRUCTIONS
Follow up with Dr. Washington as soon as possible to review your CT results and the lab results today.  Your CT today shows the cysts on your ovaries are getting larger and could be concerning for cancer.      Take the miralax 1 capful daily until stools are soft and then decrease to 1 teaspoon daily.

## 2024-07-09 LAB — CANCER AG125 SERPL-ACNC: 13 U/ML

## 2024-07-18 ENCOUNTER — OFFICE VISIT (OUTPATIENT)
Dept: CARDIOLOGY CLINIC | Age: 84
End: 2024-07-18
Payer: MEDICARE

## 2024-07-18 VITALS
HEIGHT: 63 IN | BODY MASS INDEX: 23.35 KG/M2 | SYSTOLIC BLOOD PRESSURE: 126 MMHG | OXYGEN SATURATION: 98 % | WEIGHT: 131.8 LBS | HEART RATE: 54 BPM | DIASTOLIC BLOOD PRESSURE: 60 MMHG

## 2024-07-18 DIAGNOSIS — I49.3 PVCS (PREMATURE VENTRICULAR CONTRACTIONS): Primary | ICD-10-CM

## 2024-07-18 PROCEDURE — 1036F TOBACCO NON-USER: CPT | Performed by: INTERNAL MEDICINE

## 2024-07-18 PROCEDURE — G8420 CALC BMI NORM PARAMETERS: HCPCS | Performed by: INTERNAL MEDICINE

## 2024-07-18 PROCEDURE — 3078F DIAST BP <80 MM HG: CPT | Performed by: INTERNAL MEDICINE

## 2024-07-18 PROCEDURE — G8427 DOCREV CUR MEDS BY ELIG CLIN: HCPCS | Performed by: INTERNAL MEDICINE

## 2024-07-18 PROCEDURE — G8399 PT W/DXA RESULTS DOCUMENT: HCPCS | Performed by: INTERNAL MEDICINE

## 2024-07-18 PROCEDURE — 3074F SYST BP LT 130 MM HG: CPT | Performed by: INTERNAL MEDICINE

## 2024-07-18 PROCEDURE — 1090F PRES/ABSN URINE INCON ASSESS: CPT | Performed by: INTERNAL MEDICINE

## 2024-07-18 PROCEDURE — 1123F ACP DISCUSS/DSCN MKR DOCD: CPT | Performed by: INTERNAL MEDICINE

## 2024-07-18 PROCEDURE — 99214 OFFICE O/P EST MOD 30 MIN: CPT | Performed by: INTERNAL MEDICINE

## 2024-07-18 NOTE — PROGRESS NOTES
CARDIOLOGY NOTE      7/18/2024    RE: Brenda Ye  (1940)                               TO:  Dorie Clark MD            CHIEF COMPLAINT   Brenda is a 83 y.o. female who was seen today for management of  pvcs                                    HPI:                   Pt has h/o htn, hyperlipidimea, PVCs, seen today for  fu. 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) 05/18/2023    Age-related osteoporosis without current pathological fracture 08/31/2022    Gastroesophageal reflux disease without esophagitis 11/28/2017    Ventricular tachycardia (HCC) 08/01/2017    Cyst of right ovary 09/06/2016    Heart palpitations 11/20/2014    Colitis 11/14/2013    Essential hypertension     Mixed hyperlipidemia     PVCs (premature ventricular contractions)     H/O mitral valve prolapse     Liver dysfunction     Acquired hypothyroidism      Current Outpatient Medications   Medication Sig Dispense Refill    docusate sodium (COLACE) 100 MG capsule Take 1 capsule by mouth daily as needed for Constipation 30 capsule 5    benazepril (LOTENSIN) 10 MG tablet Take 1 tablet by mouth daily 90 tablet 1    atorvastatin (LIPITOR) 10 MG tablet TAKE 1 TABLET BY MOUTH EVERY DAY 90 tablet 1    levothyroxine (SYNTHROID) 50 MCG tablet TAKE 1 TABLET BY MOUTH EVERY DAY 90 tablet 1    Magnesium 125 MG CAPS Take 250 mg by mouth daily 90 capsule 1    aspirin 81 MG tablet Take 1 tablet by mouth daily Every other day.      lactobacillus (CULTURELLE) capsule Take 1 capsule by mouth 2 times daily (with meals) 30 capsule 0    Coenzyme Q10 (CO Q 10) 100 MG CAPS Take by mouth daily       Cholecalciferol (VITAMIN D3) 2000 UNITS CAPS Take 1 capsule by mouth daily      Multiple Vitamins-Minerals (MULTIVITAMIN &

## 2024-09-03 ENCOUNTER — OFFICE VISIT (OUTPATIENT)
Age: 84
End: 2024-09-03

## 2024-09-03 VITALS
RESPIRATION RATE: 12 BRPM | SYSTOLIC BLOOD PRESSURE: 136 MMHG | TEMPERATURE: 97.8 F | DIASTOLIC BLOOD PRESSURE: 70 MMHG | WEIGHT: 130 LBS | OXYGEN SATURATION: 97 % | BODY MASS INDEX: 23.03 KG/M2 | HEART RATE: 64 BPM

## 2024-09-03 DIAGNOSIS — M81.0 AGE-RELATED OSTEOPOROSIS WITHOUT CURRENT PATHOLOGICAL FRACTURE: ICD-10-CM

## 2024-09-03 DIAGNOSIS — N83.201 CYST OF RIGHT OVARY: ICD-10-CM

## 2024-09-03 DIAGNOSIS — I10 ESSENTIAL HYPERTENSION: ICD-10-CM

## 2024-09-03 DIAGNOSIS — E03.9 ACQUIRED HYPOTHYROIDISM: ICD-10-CM

## 2024-09-03 DIAGNOSIS — I47.20 VENTRICULAR TACHYCARDIA (HCC): ICD-10-CM

## 2024-09-03 DIAGNOSIS — E78.2 MIXED HYPERLIPIDEMIA: ICD-10-CM

## 2024-09-03 DIAGNOSIS — K21.9 GASTROESOPHAGEAL REFLUX DISEASE WITHOUT ESOPHAGITIS: ICD-10-CM

## 2024-09-03 DIAGNOSIS — D03.62 MELANOMA IN SITU OF LEFT UPPER EXTREMITY INCLUDING SHOULDER (HCC): ICD-10-CM

## 2024-09-03 DIAGNOSIS — K62.89 RECTAL PAIN: Primary | ICD-10-CM

## 2024-09-03 DIAGNOSIS — R00.2 HEART PALPITATIONS: ICD-10-CM

## 2024-09-03 DIAGNOSIS — I49.3 PVCS (PREMATURE VENTRICULAR CONTRACTIONS): ICD-10-CM

## 2024-09-03 RX ORDER — PANTOPRAZOLE SODIUM 40 MG/1
40 TABLET, DELAYED RELEASE ORAL
Qty: 30 TABLET | Refills: 2 | Status: SHIPPED | OUTPATIENT
Start: 2024-09-03

## 2024-09-03 RX ORDER — FLUTICASONE PROPIONATE 50 MCG
2 SPRAY, SUSPENSION (ML) NASAL DAILY
COMMUNITY
Start: 2024-08-19

## 2024-09-03 ASSESSMENT — PATIENT HEALTH QUESTIONNAIRE - PHQ9
1. LITTLE INTEREST OR PLEASURE IN DOING THINGS: NOT AT ALL
2. FEELING DOWN, DEPRESSED OR HOPELESS: SEVERAL DAYS
SUM OF ALL RESPONSES TO PHQ QUESTIONS 1-9: 1
SUM OF ALL RESPONSES TO PHQ9 QUESTIONS 1 & 2: 1
SUM OF ALL RESPONSES TO PHQ QUESTIONS 1-9: 1

## 2024-09-03 NOTE — PROGRESS NOTES
Gastroesophageal reflux disease without esophagitis  Overview:  Pt had EGD done in 11/17 : stopped taking PPI  Doing well with out it  9. Heart palpitations  Overview:  Pt has PAC and PVC in the past.   30 event monitor : nothing significant. Has cardiology appt.   Sees Dr Jacinto and Dr Armas   Going to see Dr Armas again.   10. 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 S/p wide excision and margins free of tumor  Pt sees Dr Hernandez  11. Ventricular tachycardia (HCC)  Overview:  Exercise induced 8/1/17Cath normal coronories  Referred to Dr Armas. Recommended EP study  He is planning to start her on some medication in hospital.   She will decide : but patient decide against it   Patient is having ventricular bigeminy on EKG advised patient to see Dr. Armas but she is still reluctant to get any treatment.  If symptoms get worse go to the emergency room.    Orders Placed This Encounter   Procedures    Shauna Santiago MD, General Surgery, [place]     Return to office in 3 months.        Medications were reviewed with the patient. Continue current medications.  Appropriate prescriptions were addressed. After visit summery provided.  Follow up as directed : sooner if needed.  Questions were answered and patient verbalized understanding. Call for any problems, questions, or concerns.       Allergies   Allergen Reactions    Amiodarone      Severe hair loss    Amoxicillin Other (See Comments)     Pt states she got C-diff., so she doesn't like atb     Current Outpatient Medications   Medication Sig Dispense Refill    fluticasone (FLONASE) 50 MCG/ACT nasal spray 2 sprays by Each Nostril route daily      docusate sodium (COLACE) 100 MG capsule Take 1 capsule by mouth daily as needed for Constipation 30 capsule 5    benazepril (LOTENSIN) 10 MG tablet Take 1 tablet by mouth daily 90 tablet 1    atorvastatin (LIPITOR) 10 MG tablet TAKE 1 TABLET BY MOUTH

## 2024-09-19 ENCOUNTER — OFFICE VISIT (OUTPATIENT)
Dept: SURGERY | Age: 84
End: 2024-09-19
Payer: MEDICARE

## 2024-09-19 VITALS
WEIGHT: 130.1 LBS | SYSTOLIC BLOOD PRESSURE: 116 MMHG | OXYGEN SATURATION: 95 % | HEART RATE: 103 BPM | DIASTOLIC BLOOD PRESSURE: 80 MMHG | HEIGHT: 63 IN | BODY MASS INDEX: 23.05 KG/M2

## 2024-09-19 DIAGNOSIS — K21.9 GASTROESOPHAGEAL REFLUX DISEASE, UNSPECIFIED WHETHER ESOPHAGITIS PRESENT: Primary | ICD-10-CM

## 2024-09-19 DIAGNOSIS — K62.89 ANAL OR RECTAL PAIN: ICD-10-CM

## 2024-09-19 PROCEDURE — 1036F TOBACCO NON-USER: CPT | Performed by: PHYSICIAN ASSISTANT

## 2024-09-19 PROCEDURE — 1090F PRES/ABSN URINE INCON ASSESS: CPT | Performed by: PHYSICIAN ASSISTANT

## 2024-09-19 PROCEDURE — G8427 DOCREV CUR MEDS BY ELIG CLIN: HCPCS | Performed by: PHYSICIAN ASSISTANT

## 2024-09-19 PROCEDURE — 1123F ACP DISCUSS/DSCN MKR DOCD: CPT | Performed by: PHYSICIAN ASSISTANT

## 2024-09-19 PROCEDURE — G8420 CALC BMI NORM PARAMETERS: HCPCS | Performed by: PHYSICIAN ASSISTANT

## 2024-09-19 PROCEDURE — 99214 OFFICE O/P EST MOD 30 MIN: CPT | Performed by: PHYSICIAN ASSISTANT

## 2024-09-21 ASSESSMENT — ENCOUNTER SYMPTOMS
BACK PAIN: 0
ANAL BLEEDING: 0
EYE ITCHING: 0
BLOOD IN STOOL: 0
CONSTIPATION: 1
COLOR CHANGE: 0
EYE REDNESS: 0
STRIDOR: 0
ABDOMINAL PAIN: 1
VOMITING: 0
PHOTOPHOBIA: 0
RECTAL PAIN: 1
APNEA: 0
SORE THROAT: 0
NAUSEA: 0
CHOKING: 0

## 2024-09-24 ENCOUNTER — HOSPITAL ENCOUNTER (OUTPATIENT)
Dept: GENERAL RADIOLOGY | Age: 84
Discharge: HOME | End: 2024-09-24
Payer: MEDICARE

## 2024-09-24 DIAGNOSIS — K21.9 GASTROESOPHAGEAL REFLUX DISEASE, UNSPECIFIED WHETHER ESOPHAGITIS PRESENT: ICD-10-CM

## 2024-09-24 PROCEDURE — 74220 X-RAY XM ESOPHAGUS 1CNTRST: CPT

## 2024-09-29 ENCOUNTER — HOSPITAL ENCOUNTER (EMERGENCY)
Age: 84
Discharge: HOME | End: 2024-09-29
Attending: STUDENT IN AN ORGANIZED HEALTH CARE EDUCATION/TRAINING PROGRAM
Payer: MEDICARE

## 2024-09-29 VITALS
RESPIRATION RATE: 16 BRPM | BODY MASS INDEX: 23.39 KG/M2 | OXYGEN SATURATION: 100 % | WEIGHT: 132 LBS | TEMPERATURE: 98.5 F | HEIGHT: 63 IN | SYSTOLIC BLOOD PRESSURE: 161 MMHG | DIASTOLIC BLOOD PRESSURE: 65 MMHG | HEART RATE: 49 BPM

## 2024-09-29 DIAGNOSIS — Z51.89 VISIT FOR WOUND CHECK: Primary | ICD-10-CM

## 2024-09-29 PROCEDURE — 99282 EMERGENCY DEPT VISIT SF MDM: CPT

## 2024-09-29 ASSESSMENT — PAIN - FUNCTIONAL ASSESSMENT: PAIN_FUNCTIONAL_ASSESSMENT: NONE - DENIES PAIN

## 2024-09-29 NOTE — DISCHARGE INSTRUCTIONS
You are seen today for a wound check.  Your wound appears to be healing well.  Please call the office Monday or Tuesday to get clarification on your continued wound care.  Right now, you have Steri-Strips over the wound.  Because you have Steri-Strips in place, you do not need to wash the wound or apply Vaseline.  Please clarify with your physician if they would like you to remove the Steri-Strips and begin to wash the wound daily and apply Vaseline.    If you have any issues, worsening or concerning symptoms, please feel free to return to the emergency department at any time

## 2024-09-29 NOTE — ED PROVIDER NOTES
Emergency Department Encounter    Patient: Brenda Ye  MRN: [id number]  : 1940  Date of Evaluation: 2024  ED Provider:  Sabiha Donahue MD    Triage Chief Complaint:   Wound Check (Pt had spot removed from L thigh at dermatologist Thurs afternoon. Pt states she noticed some blood around steristrips and was concerned. No active bleeding in triage, no malodor, no purulent drainage noted. )    [place]:  Brenda Ye is a 84 y.o. female that presents for a wound check.   Patient had a excision performed as an outpatient on the left thigh on 2024.  States that her instructions were after 48 hours she could remove the dressing.  She states that today she removed the dressing and there was significant amount of dried blood on the Steri-Strips which made her concerned.  She was unsure if she was supposed to remove the Steri-Strips as she was told to leave them in place, but was also told she needed to start washing the wound daily and applying Vaseline.  She states otherwise she is having no issues.  Has been doing well.    Review Of Systems   Review of Systems   Constitutional:  Negative for chills and fever.   Respiratory:  Negative for shortness of breath.    Cardiovascular:  Negative for chest pain.   Gastrointestinal:  Negative for abdominal pain.       Physical Exam   Triage VS:    ED Triage Vitals [24 0106]   Encounter Vitals Group      BP (!) 175/78      Systolic BP Percentile       Diastolic BP Percentile       Pulse (!) 49      Respirations 16      Temp 98.5 °F (36.9 °C)      Temp Source Oral      SpO2 100 %      Weight - Scale 59.9 kg (132 lb)      Height 1.6 m (5' 3\")      Head Circumference       Peak Flow       Pain Score       Pain Loc       Pain Education       Exclude from Growth Chart      Physical Exam  Vitals and nursing note reviewed.   Constitutional:       General: She is not in acute distress.     Appearance: She is not toxic-appearing.   HENT:      Head: Normocephalic and  None

## 2024-09-30 ENCOUNTER — HOSPITAL ENCOUNTER (OUTPATIENT)
Age: 84
Setting detail: OBSERVATION
Discharge: HOME | End: 2024-10-01
Attending: EMERGENCY MEDICINE | Admitting: STUDENT IN AN ORGANIZED HEALTH CARE EDUCATION/TRAINING PROGRAM
Payer: MEDICARE

## 2024-09-30 ENCOUNTER — APPOINTMENT (OUTPATIENT)
Dept: GENERAL RADIOLOGY | Age: 84
End: 2024-09-30
Payer: MEDICARE

## 2024-09-30 DIAGNOSIS — E03.9 ACQUIRED HYPOTHYROIDISM: ICD-10-CM

## 2024-09-30 DIAGNOSIS — R07.9 CHEST PAIN, UNSPECIFIED TYPE: Primary | ICD-10-CM

## 2024-09-30 LAB
ALBUMIN SERPL-MCNC: 4.1 G/DL (ref 3.4–5)
ALBUMIN/GLOB SERPL: 1.8 {RATIO} (ref 1.1–2.2)
ALP SERPL-CCNC: 77 U/L (ref 40–129)
ALT SERPL-CCNC: 20 U/L (ref 10–40)
ANION GAP SERPL CALCULATED.3IONS-SCNC: 11 MMOL/L (ref 4–16)
AST SERPL-CCNC: 28 U/L (ref 15–37)
BILIRUB SERPL-MCNC: 0.3 MG/DL (ref 0–1)
BUN SERPL-MCNC: 23 MG/DL (ref 6–23)
CALCIUM SERPL-MCNC: 9.2 MG/DL (ref 8.3–10.6)
CHLORIDE SERPL-SCNC: 101 MMOL/L (ref 99–110)
CO2 SERPL-SCNC: 24 MMOL/L (ref 21–32)
CREAT SERPL-MCNC: 0.8 MG/DL (ref 0.6–1.1)
ERYTHROCYTE [DISTWIDTH] IN BLOOD BY AUTOMATED COUNT: 13.5 % (ref 11.7–14.9)
GFR, ESTIMATED: 72 ML/MIN/1.73M2
GLUCOSE SERPL-MCNC: 90 MG/DL (ref 70–99)
HCT VFR BLD AUTO: 39.7 % (ref 37–47)
HGB BLD-MCNC: 13.3 G/DL (ref 12.5–16)
MCH RBC QN AUTO: 30.5 PG (ref 27–31)
MCHC RBC AUTO-ENTMCNC: 33.5 G/DL (ref 32–36)
MCV RBC AUTO: 91.1 FL (ref 78–100)
PLATELET # BLD AUTO: 201 K/UL (ref 140–440)
PMV BLD AUTO: 10.3 FL (ref 7.5–11.1)
POTASSIUM SERPL-SCNC: 4.2 MMOL/L (ref 3.5–5.1)
PROT SERPL-MCNC: 6.4 G/DL (ref 6.4–8.2)
RBC # BLD AUTO: 4.36 M/UL (ref 4.2–5.4)
SODIUM SERPL-SCNC: 136 MMOL/L (ref 135–145)
TROPONIN I SERPL HS-MCNC: 13 NG/L (ref 0–13)
TROPONIN I SERPL HS-MCNC: 13 NG/L (ref 0–13)
TROPONIN I SERPL HS-MCNC: 14 NG/L (ref 0–13)
TSH SERPL DL<=0.05 MIU/L-ACNC: 3.26 UIU/ML (ref 0.27–4.2)
WBC OTHER # BLD: 7.6 K/UL (ref 4–10.5)

## 2024-09-30 PROCEDURE — 99285 EMERGENCY DEPT VISIT HI MDM: CPT

## 2024-09-30 PROCEDURE — 85027 COMPLETE CBC AUTOMATED: CPT

## 2024-09-30 PROCEDURE — G0378 HOSPITAL OBSERVATION PER HR: HCPCS

## 2024-09-30 PROCEDURE — 93005 ELECTROCARDIOGRAM TRACING: CPT | Performed by: EMERGENCY MEDICINE

## 2024-09-30 PROCEDURE — 93005 ELECTROCARDIOGRAM TRACING: CPT | Performed by: NURSE PRACTITIONER

## 2024-09-30 PROCEDURE — 2580000003 HC RX 258: Performed by: NURSE PRACTITIONER

## 2024-09-30 PROCEDURE — 71045 X-RAY EXAM CHEST 1 VIEW: CPT

## 2024-09-30 PROCEDURE — 84443 ASSAY THYROID STIM HORMONE: CPT

## 2024-09-30 PROCEDURE — 80053 COMPREHEN METABOLIC PANEL: CPT

## 2024-09-30 PROCEDURE — 84484 ASSAY OF TROPONIN QUANT: CPT

## 2024-09-30 PROCEDURE — 6370000000 HC RX 637 (ALT 250 FOR IP): Performed by: NURSE PRACTITIONER

## 2024-09-30 RX ORDER — SODIUM CHLORIDE 9 MG/ML
INJECTION, SOLUTION INTRAVENOUS PRN
Status: DISCONTINUED | OUTPATIENT
Start: 2024-09-30 | End: 2024-10-01 | Stop reason: HOSPADM

## 2024-09-30 RX ORDER — MAGNESIUM SULFATE IN WATER 40 MG/ML
2000 INJECTION, SOLUTION INTRAVENOUS PRN
Status: DISCONTINUED | OUTPATIENT
Start: 2024-09-30 | End: 2024-10-01 | Stop reason: HOSPADM

## 2024-09-30 RX ORDER — NITROGLYCERIN 0.4 MG/1
0.4 TABLET SUBLINGUAL EVERY 5 MIN PRN
Status: DISCONTINUED | OUTPATIENT
Start: 2024-09-30 | End: 2024-10-01 | Stop reason: HOSPADM

## 2024-09-30 RX ORDER — POTASSIUM CHLORIDE 1500 MG/1
40 TABLET, EXTENDED RELEASE ORAL PRN
Status: DISCONTINUED | OUTPATIENT
Start: 2024-09-30 | End: 2024-10-01 | Stop reason: HOSPADM

## 2024-09-30 RX ORDER — ONDANSETRON 2 MG/ML
4 INJECTION INTRAMUSCULAR; INTRAVENOUS EVERY 6 HOURS PRN
Status: DISCONTINUED | OUTPATIENT
Start: 2024-09-30 | End: 2024-10-01 | Stop reason: HOSPADM

## 2024-09-30 RX ORDER — POLYETHYLENE GLYCOL 3350 17 G/17G
17 POWDER, FOR SOLUTION ORAL DAILY PRN
Status: DISCONTINUED | OUTPATIENT
Start: 2024-09-30 | End: 2024-10-01 | Stop reason: HOSPADM

## 2024-09-30 RX ORDER — POTASSIUM CHLORIDE 7.45 MG/ML
10 INJECTION INTRAVENOUS PRN
Status: DISCONTINUED | OUTPATIENT
Start: 2024-09-30 | End: 2024-10-01 | Stop reason: HOSPADM

## 2024-09-30 RX ORDER — ACETAMINOPHEN 650 MG/1
650 SUPPOSITORY RECTAL EVERY 6 HOURS PRN
Status: DISCONTINUED | OUTPATIENT
Start: 2024-09-30 | End: 2024-10-01 | Stop reason: HOSPADM

## 2024-09-30 RX ORDER — SODIUM CHLORIDE 9 MG/ML
INJECTION, SOLUTION INTRAVENOUS CONTINUOUS
Status: DISCONTINUED | OUTPATIENT
Start: 2024-09-30 | End: 2024-10-01

## 2024-09-30 RX ORDER — SODIUM CHLORIDE 0.9 % (FLUSH) 0.9 %
10 SYRINGE (ML) INJECTION EVERY 12 HOURS SCHEDULED
Status: DISCONTINUED | OUTPATIENT
Start: 2024-09-30 | End: 2024-10-01 | Stop reason: HOSPADM

## 2024-09-30 RX ORDER — ONDANSETRON 4 MG/1
4 TABLET, ORALLY DISINTEGRATING ORAL EVERY 8 HOURS PRN
Status: DISCONTINUED | OUTPATIENT
Start: 2024-09-30 | End: 2024-10-01 | Stop reason: HOSPADM

## 2024-09-30 RX ORDER — LEVOTHYROXINE SODIUM 50 UG/1
50 TABLET ORAL DAILY
Status: DISCONTINUED | OUTPATIENT
Start: 2024-10-01 | End: 2024-10-01 | Stop reason: HOSPADM

## 2024-09-30 RX ORDER — PANTOPRAZOLE SODIUM 40 MG/1
40 TABLET, DELAYED RELEASE ORAL
Qty: 90 TABLET | Refills: 1 | Status: SHIPPED | OUTPATIENT
Start: 2024-09-30

## 2024-09-30 RX ORDER — ENOXAPARIN SODIUM 100 MG/ML
40 INJECTION SUBCUTANEOUS DAILY
Status: DISCONTINUED | OUTPATIENT
Start: 2024-10-01 | End: 2024-10-01 | Stop reason: HOSPADM

## 2024-09-30 RX ORDER — CARVEDILOL 3.12 MG/1
3.12 TABLET ORAL 2 TIMES DAILY WITH MEALS
Status: DISCONTINUED | OUTPATIENT
Start: 2024-10-01 | End: 2024-10-01 | Stop reason: HOSPADM

## 2024-09-30 RX ORDER — SODIUM CHLORIDE 0.9 % (FLUSH) 0.9 %
10 SYRINGE (ML) INJECTION PRN
Status: DISCONTINUED | OUTPATIENT
Start: 2024-09-30 | End: 2024-10-01 | Stop reason: HOSPADM

## 2024-09-30 RX ORDER — MORPHINE SULFATE 4 MG/ML
4 INJECTION, SOLUTION INTRAMUSCULAR; INTRAVENOUS ONCE
Status: DISCONTINUED | OUTPATIENT
Start: 2024-09-30 | End: 2024-10-01 | Stop reason: HOSPADM

## 2024-09-30 RX ORDER — ATORVASTATIN CALCIUM 10 MG/1
10 TABLET, FILM COATED ORAL NIGHTLY
Status: DISCONTINUED | OUTPATIENT
Start: 2024-09-30 | End: 2024-10-01 | Stop reason: HOSPADM

## 2024-09-30 RX ORDER — ACETAMINOPHEN 325 MG/1
650 TABLET ORAL EVERY 6 HOURS PRN
Status: DISCONTINUED | OUTPATIENT
Start: 2024-09-30 | End: 2024-10-01 | Stop reason: HOSPADM

## 2024-09-30 RX ORDER — PANTOPRAZOLE SODIUM 40 MG/1
40 TABLET, DELAYED RELEASE ORAL
Status: DISCONTINUED | OUTPATIENT
Start: 2024-10-01 | End: 2024-10-01 | Stop reason: HOSPADM

## 2024-09-30 RX ORDER — ASPIRIN 81 MG/1
81 TABLET, CHEWABLE ORAL DAILY
Status: DISCONTINUED | OUTPATIENT
Start: 2024-10-01 | End: 2024-10-01 | Stop reason: ALTCHOICE

## 2024-09-30 RX ADMIN — ATORVASTATIN CALCIUM 10 MG: 10 TABLET, FILM COATED ORAL at 23:00

## 2024-09-30 RX ADMIN — SODIUM CHLORIDE: 9 INJECTION, SOLUTION INTRAVENOUS at 22:36

## 2024-09-30 ASSESSMENT — PAIN - FUNCTIONAL ASSESSMENT: PAIN_FUNCTIONAL_ASSESSMENT: NONE - DENIES PAIN

## 2024-09-30 NOTE — ED PROVIDER NOTES
esophagogastric junction is widely patent. Limited imaging of the stomach shows suboptimal coating of the gastric wall. While nonspecific, this may be due to excess gastric secretions. Normal appearance of the duodenum is seen. Electronically signed by Sammy Alvarez MD        MDM:     Discussion with Other Professionals : Admitting Team Dr. Patton and Consultant Dr. Del Angel    Social Determinants: None    Records Reviewed : Outpatient Labs & Studies shows echocardiogram from earlier this year which showed ejection fraction of 60 to 65%    Chronic conditions affecting care: HTN, HLD    Labs ordered and results as above (interpreted by myself), CBC showed no significant concerning anemia, chemistry showed no significant concerning electrolyte derangements or acute renal failure, and hepatic function panel showed no transaminitis to suggest hepatic dysfunction/inflammation  Imaging interpreted and reviewed by myself: CXR showed no evidence of pneumonia, new effusion, or pneumothorax.  EKG interpreted by myself as above, not acutely concerning    Patient was given the following medications:  Medications - No data to display    Disposition Discussion:  Patient does have an elevated heart score so we did discuss hospitalization.  Patient was agreeable.  Spoke with  team who accepted admission as long as cardiology was consulted and second troponin was negative.  Plan at this time will be to hospitalize here at [facility] for further care.  Patient remains neurovascularly intact currently with only minimal symptoms    Is this patient to be included in the SEP-1 core measure due to severe sepsis or septic shock? No Exclusion criteria - the patient is NOT to be included for SEP-1 Core Measure due to: Infection is not suspected     Disposition: Hospitalized     I am the primary physician of record    Clinical Impression:  1. Chest pain, unspecified type      Disposition referral (if applicable):  No follow-up provider

## 2024-10-01 ENCOUNTER — APPOINTMENT (OUTPATIENT)
Age: 84
End: 2024-10-01
Payer: MEDICARE

## 2024-10-01 VITALS
RESPIRATION RATE: 18 BRPM | SYSTOLIC BLOOD PRESSURE: 125 MMHG | DIASTOLIC BLOOD PRESSURE: 61 MMHG | HEIGHT: 63 IN | OXYGEN SATURATION: 98 % | BODY MASS INDEX: 22.68 KG/M2 | WEIGHT: 128 LBS | HEART RATE: 58 BPM | TEMPERATURE: 98.1 F

## 2024-10-01 LAB
ALBUMIN SERPL-MCNC: 3.4 G/DL (ref 3.4–5)
ALBUMIN/GLOB SERPL: 1.5 {RATIO} (ref 1.1–2.2)
ALP SERPL-CCNC: 56 U/L (ref 40–129)
ALT SERPL-CCNC: 16 U/L (ref 10–40)
ANION GAP SERPL CALCULATED.3IONS-SCNC: 8 MMOL/L (ref 4–16)
AST SERPL-CCNC: 24 U/L (ref 15–37)
BILIRUB SERPL-MCNC: 0.4 MG/DL (ref 0–1)
BUN SERPL-MCNC: 17 MG/DL (ref 6–23)
CALCIUM SERPL-MCNC: 8.7 MG/DL (ref 8.3–10.6)
CHLORIDE SERPL-SCNC: 107 MMOL/L (ref 99–110)
CHOLEST SERPL-MCNC: 136 MG/DL (ref 125–199)
CO2 SERPL-SCNC: 25 MMOL/L (ref 21–32)
CREAT SERPL-MCNC: 0.7 MG/DL (ref 0.6–1.1)
ECHO AO DESC DIAM: 1.6 CM
ECHO AO DESCENDING AORTA INDEX: 1 CM/M2
ECHO AO ROOT DIAM: 3.1 CM
ECHO AO ROOT INDEX: 1.94 CM/M2
ECHO AV AREA PEAK VELOCITY: 1.8 CM2
ECHO AV AREA VTI: 2 CM2
ECHO AV AREA/BSA PEAK VELOCITY: 1.1 CM2/M2
ECHO AV AREA/BSA VTI: 1.3 CM2/M2
ECHO AV MEAN GRADIENT: 4 MMHG
ECHO AV MEAN VELOCITY: 0.9 M/S
ECHO AV PEAK GRADIENT: 6 MMHG
ECHO AV PEAK VELOCITY: 1.3 M/S
ECHO AV VELOCITY RATIO: 0.54
ECHO AV VTI: 34.3 CM
ECHO BSA: 1.61 M2
ECHO EST RA PRESSURE: 3 MMHG
ECHO IVC PROX: 1.3 CM
ECHO LA AREA 4C: 16.1 CM2
ECHO LA DIAMETER INDEX: 2.06 CM/M2
ECHO LA DIAMETER: 3.3 CM
ECHO LA MAJOR AXIS: 6 CM
ECHO LA TO AORTIC ROOT RATIO: 1.06
ECHO LA VOL MOD A4C: 35 ML (ref 22–52)
ECHO LA VOLUME INDEX MOD A4C: 22 ML/M2 (ref 16–34)
ECHO LV E' LATERAL VELOCITY: 10.5 CM/S
ECHO LV E' SEPTAL VELOCITY: 5.7 CM/S
ECHO LV EDV A4C: 72 ML
ECHO LV EDV INDEX A4C: 45 ML/M2
ECHO LV EF PHYSICIAN: 55 %
ECHO LV EJECTION FRACTION A4C: 61 %
ECHO LV ESV A4C: 28 ML
ECHO LV ESV INDEX A4C: 18 ML/M2
ECHO LV FRACTIONAL SHORTENING: 62 % (ref 28–44)
ECHO LV INTERNAL DIMENSION DIASTOLE INDEX: 2.44 CM/M2
ECHO LV INTERNAL DIMENSION DIASTOLIC: 3.9 CM (ref 3.9–5.3)
ECHO LV INTERNAL DIMENSION SYSTOLIC INDEX: 0.94 CM/M2
ECHO LV INTERNAL DIMENSION SYSTOLIC: 1.5 CM
ECHO LV IVSD: 0.7 CM (ref 0.6–0.9)
ECHO LV MASS 2D: 105.3 G (ref 67–162)
ECHO LV MASS INDEX 2D: 65.8 G/M2 (ref 43–95)
ECHO LV POSTERIOR WALL DIASTOLIC: 1.1 CM (ref 0.6–0.9)
ECHO LV RELATIVE WALL THICKNESS RATIO: 0.56
ECHO LVOT AREA: 3.1 CM2
ECHO LVOT AV VTI INDEX: 0.65
ECHO LVOT DIAM: 2 CM
ECHO LVOT MEAN GRADIENT: 2 MMHG
ECHO LVOT PEAK GRADIENT: 2 MMHG
ECHO LVOT PEAK VELOCITY: 0.7 M/S
ECHO LVOT STROKE VOLUME INDEX: 43.6 ML/M2
ECHO LVOT SV: 69.7 ML
ECHO LVOT VTI: 22.2 CM
ECHO MV A VELOCITY: 0.86 M/S
ECHO MV E VELOCITY: 1.13 M/S
ECHO MV E/A RATIO: 1.31
ECHO MV E/E' LATERAL: 10.76
ECHO MV E/E' RATIO (AVERAGED): 15.29
ECHO MV E/E' SEPTAL: 19.82
ECHO RIGHT VENTRICULAR SYSTOLIC PRESSURE (RVSP): 17 MMHG
ECHO RV INTERNAL DIMENSION: 2.4 CM
ECHO TV REGURGITANT MAX VELOCITY: 1.9 M/S
ECHO TV REGURGITANT PEAK GRADIENT: 14 MMHG
EKG ATRIAL RATE: 31 BPM
EKG ATRIAL RATE: 92 BPM
EKG DIAGNOSIS: NORMAL
EKG DIAGNOSIS: NORMAL
EKG P AXIS: 65 DEGREES
EKG P AXIS: 72 DEGREES
EKG P-R INTERVAL: 204 MS
EKG P-R INTERVAL: 234 MS
EKG Q-T INTERVAL: 396 MS
EKG Q-T INTERVAL: 408 MS
EKG QRS DURATION: 76 MS
EKG QRS DURATION: 78 MS
EKG QTC CALCULATION (BAZETT): 476 MS
EKG QTC CALCULATION (BAZETT): 489 MS
EKG R AXIS: 31 DEGREES
EKG R AXIS: 60 DEGREES
EKG T AXIS: -16 DEGREES
EKG T AXIS: 34 DEGREES
EKG VENTRICULAR RATE: 82 BPM
EKG VENTRICULAR RATE: 92 BPM
ERYTHROCYTE [DISTWIDTH] IN BLOOD BY AUTOMATED COUNT: 13.6 % (ref 11.7–14.9)
EST. AVERAGE GLUCOSE BLD GHB EST-MCNC: 122 MG/DL
GFR, ESTIMATED: 84 ML/MIN/1.73M2
GLUCOSE SERPL-MCNC: 88 MG/DL (ref 70–99)
HBA1C MFR BLD: 5.9 % (ref 4.2–6.3)
HCT VFR BLD AUTO: 35.8 % (ref 37–47)
HDLC SERPL-MCNC: 58 MG/DL
HGB BLD-MCNC: 12 G/DL (ref 12.5–16)
LDLC SERPL CALC-MCNC: 64 MG/DL
MAGNESIUM SERPL-MCNC: 2 MG/DL (ref 1.8–2.4)
MCH RBC QN AUTO: 30.7 PG (ref 27–31)
MCHC RBC AUTO-ENTMCNC: 33.5 G/DL (ref 32–36)
MCV RBC AUTO: 91.6 FL (ref 78–100)
PLATELET # BLD AUTO: 182 K/UL (ref 140–440)
PMV BLD AUTO: 10.4 FL (ref 7.5–11.1)
POTASSIUM SERPL-SCNC: 4.3 MMOL/L (ref 3.5–5.1)
PROT SERPL-MCNC: 5.6 G/DL (ref 6.4–8.2)
RBC # BLD AUTO: 3.91 M/UL (ref 4.2–5.4)
SODIUM SERPL-SCNC: 140 MMOL/L (ref 135–145)
TRIGL SERPL-MCNC: 70 MG/DL
WBC OTHER # BLD: 5.4 K/UL (ref 4–10.5)

## 2024-10-01 PROCEDURE — 83735 ASSAY OF MAGNESIUM: CPT

## 2024-10-01 PROCEDURE — G0378 HOSPITAL OBSERVATION PER HR: HCPCS

## 2024-10-01 PROCEDURE — 83036 HEMOGLOBIN GLYCOSYLATED A1C: CPT

## 2024-10-01 PROCEDURE — 93306 TTE W/DOPPLER COMPLETE: CPT

## 2024-10-01 PROCEDURE — 99222 1ST HOSP IP/OBS MODERATE 55: CPT | Performed by: INTERNAL MEDICINE

## 2024-10-01 PROCEDURE — 93010 ELECTROCARDIOGRAM REPORT: CPT | Performed by: INTERNAL MEDICINE

## 2024-10-01 PROCEDURE — 80061 LIPID PANEL: CPT

## 2024-10-01 PROCEDURE — 85027 COMPLETE CBC AUTOMATED: CPT

## 2024-10-01 PROCEDURE — 6370000000 HC RX 637 (ALT 250 FOR IP): Performed by: NURSE PRACTITIONER

## 2024-10-01 PROCEDURE — 96372 THER/PROPH/DIAG INJ SC/IM: CPT

## 2024-10-01 PROCEDURE — 6360000002 HC RX W HCPCS: Performed by: NURSE PRACTITIONER

## 2024-10-01 PROCEDURE — 80053 COMPREHEN METABOLIC PANEL: CPT

## 2024-10-01 RX ORDER — CARVEDILOL 3.12 MG/1
3.12 TABLET ORAL 2 TIMES DAILY WITH MEALS
Qty: 60 TABLET | Refills: 3 | Status: SHIPPED | OUTPATIENT
Start: 2024-10-01

## 2024-10-01 RX ORDER — ASPIRIN 81 MG/1
81 TABLET ORAL DAILY
Status: DISCONTINUED | OUTPATIENT
Start: 2024-10-01 | End: 2024-10-01 | Stop reason: HOSPADM

## 2024-10-01 RX ADMIN — CARVEDILOL 3.12 MG: 3.12 TABLET, FILM COATED ORAL at 09:37

## 2024-10-01 RX ADMIN — ASPIRIN 81 MG: 81 TABLET, COATED ORAL at 09:37

## 2024-10-01 RX ADMIN — PANTOPRAZOLE SODIUM 40 MG: 40 TABLET, DELAYED RELEASE ORAL at 06:02

## 2024-10-01 RX ADMIN — LEVOTHYROXINE SODIUM 50 MCG: 0.05 TABLET ORAL at 06:02

## 2024-10-01 RX ADMIN — ENOXAPARIN SODIUM 40 MG: 100 INJECTION SUBCUTANEOUS at 09:37

## 2024-10-01 NOTE — PROGRESS NOTES
Went over discharge paperwork. Pt is to follow up with Cardiology on 10/29/2024. Pt will also follow up with PCP within a week. IV removed by [facility] and dressing placed. Went over new medication that was started here Coreg 3.125 mg. Pt was taken by wheelchair to her car that was parked in ER. Pt drove home.

## 2024-10-01 NOTE — PLAN OF CARE
Problem: ABCDS Injury Assessment  Goal: Absence of physical injury  Outcome: Progressing     Problem: Cardiovascular - Adult  Goal: Maintains optimal cardiac output and hemodynamic stability  Outcome: Progressing  Goal: Absence of cardiac dysrhythmias or at baseline  Outcome: Progressing     Problem: Skin/Tissue Integrity - Adult  Goal: Skin integrity remains intact  Outcome: Progressing  Goal: Incisions, wounds, or drain sites healing without S/S of infection  Outcome: Progressing     Problem: Hematologic - Adult  Goal: Maintains hematologic stability  Outcome: Progressing     Problem: Discharge Planning  Goal: Discharge to home or other facility with appropriate resources  Outcome: Progressing     Problem: Pain  Goal: Verbalizes/displays adequate comfort level or baseline comfort level  Outcome: Progressing

## 2024-10-01 NOTE — PLAN OF CARE
Problem: ABCDS Injury Assessment  Goal: Absence of physical injury  Outcome: Completed     Problem: Cardiovascular - Adult  Goal: Maintains optimal cardiac output and hemodynamic stability  Outcome: Completed  Goal: Absence of cardiac dysrhythmias or at baseline  Outcome: Completed     Problem: Skin/Tissue Integrity - Adult  Goal: Skin integrity remains intact  Outcome: Completed  Goal: Incisions, wounds, or drain sites healing without S/S of infection  Outcome: Completed     Problem: Hematologic - Adult  Goal: Maintains hematologic stability  Outcome: Completed     Problem: Discharge Planning  Goal: Discharge to home or other facility with appropriate resources  Outcome: Completed     Problem: Pain  Goal: Verbalizes/displays adequate comfort level or baseline comfort level  Outcome: Completed

## 2024-10-01 NOTE — PROGRESS NOTES
V2.0  [facility] Hospitalist Progress Note      Name:  Brenda Ye /Age/Sex: 1940  (84 y.o. female)   MRN & CSN:  [id number] & [id number] Encounter Date/Time: 10/1/2024 11:10 AM EDT    Location:   PCP: Dorie Velazquez MD       Hospital Day: 2    Assessment and Plan:   Brenda Ye is a 84 y.o. female who presents with Chest pain      Plan:    Chest pain r/o ACS.   -Concern for anginal source given risk factors.   -Hx of exercise-induced frequent complex ventricular arrhythmia   -[facility]  normal coronaries   -Troponin trend negative    -EKG shows no ischemic changes.  Telemetry interpreted by me sinus rhythm with PVCs.   -CXR nonacute  -Telemetry monitoring   -Cardiology consult pending. Follows with Dr. Jacinto as outpatient  -Antiplatelet/BB/Statin/sl Nitro on medication regimen.  -Repeat echo completed; read pending. Last TTE (2024)   -Dispo: pending cardio eval, may need transfer to [facility] for stress test     Hypothyroid  -TSH 3.26  -Continue Synthroid     Hyperlipidemia   -Continue statin  -Lipid panel reviewed     Hx of recurrent C Diff      Hx fo LE DVT       Diet ADULT DIET; Regular; No Caffeine   DVT Prophylaxis [x] Lovenox, []  Heparin, [] SCDs, [] Ambulation,  [] Eliquis, [] Xarelto  [] Coumadin   Code Status Full Code   Disposition From: Home  Expected Disposition: Home  Estimated Date of Discharge: TBD  Patient requires continued admission due to pending consult   Surrogate Decision Maker/ POA Son Matti     Subjective:     Patient seen and examined this morning.  She currently denies any complaints.  She reports that her chest pain is intermittent.  She states that she does not recall ever having a stress test.  She currently is chest pain-free.      Review of Systems:    Review of Systems    10 point review of systems complete, negative unless stated above    Objective:   No intake or output data in the 24 hours ending 10/01/24 1110     Vitals:   Vitals:    10/01/24 0930   BP:    Pulse: 55

## 2024-10-01 NOTE — PROGRESS NOTES
The patient's daily home medications were updated in the chart's Home Medication List after being reviewed with:   [x] the patient.  [] the patient's facility MAR.  [] the patient's family or caregiver.  [] the patient's pharmacy.

## 2024-10-01 NOTE — CONSULTS
John, APRN - CNP   40 mg at 10/01/24 0937    morphine sulfate (PF) injection 4 mg  4 mg IntraVENous Once John Ding APRN - CNP           Physical Examination:      Vitals:    10/01/24 0711 10/01/24 0844 10/01/24 0930 10/01/24 1249   BP: (!) 129/58 (!) 129/58  125/61   Pulse: (!) 47 (!) 49 55 58   Resp: 18      Temp: 98.1 °F (36.7 °C)      TempSrc:       SpO2: 98%      Weight:  58.1 kg (128 lb)     Height:  1.6 m (5' 3\")         Wt Readings from Last 3 Encounters:   10/01/24 58.1 kg (128 lb)   09/29/24 59.9 kg (132 lb)   09/19/24 59 kg (130 lb 1.6 oz)       General appearance: Normally built and nourished female in no apparent distress    NECK: No JVP or thyromegaly    Cardiovascular:  Auscultation: Normal S1 and S2.  No significant murmurs appreciated. No bruits noted in both carotids.      Respiratory:  Respiratory effort is normal.  Breath sounds are clear to auscultation    Extremities: Has clean incision covered with Steri-Strips which are dry without any discharge on her left thigh in that aspect.  There is no swelling or subcutaneous hematoma.    SKIN: Warm and well perfused, no pallor or cyanosis    Neurologic:  Oriented to time, place, and person and non-anxious. No focal neurological deficit noted.    Psychiatric: Judgment/Insight and Mood is normal    Lab Review   Recent Labs     10/01/24  0615   WBC 5.4   HGB 12.0*   HCT 35.8*         Recent Labs     10/01/24  0615      K 4.3      CO2 25   BUN 17   CREATININE 0.7     Recent Labs     10/01/24  0615   AST 24   ALT 16   BILITOT 0.4   ALKPHOS 56     Lab Results   Component Value Date    CHOLFAST 177 06/03/2024    TRIGLYCFAST 61 06/03/2024    HDL 58 10/01/2024     Lab Results   Component Value Date    CKTOTAL 152 09/14/2012    CKTOTAL 319 (H) 06/29/2012    CKMB 2.4 09/14/2012    TROPONINI <0.006 09/15/2012    TROPONINI 0.007 09/14/2012    TROPONINI <0.006 09/14/2012    TROPHS 14 (H) 09/30/2024    TROPHS 13 09/30/2024    TROPHS 13

## 2024-10-01 NOTE — PROGRESS NOTES
This RN has reviewed and agrees with YANIV Hightower LPN's data collection and has collaborated with this LPN regarding the patient's care plan.

## 2024-10-01 NOTE — DISCHARGE SUMMARY
V2.0  Discharge Summary    Name:  Brenda Ye /Age/Sex: 1940 (84 y.o. female)   Admit Date: 2024  Discharge Date: 10/1/24    MRN & CSN:  [id number] & [id number] Encounter Date and Time 10/1/24 2:58 PM EDT    Attending:  Mike Philippe MD Discharging Provider: JESSENIA Chan - CNP       Hospital Course:     Brief HPI: Brenda Ye is a 84 y.o. female who presented with chest pain     Brief Problem Based Course:     Chest pain r/o ACS.   -Concern for anginal source given risk factors.   -Hx of exercise-induced frequent complex ventricular arrhythmia   -[facility]  normal coronaries   -Troponin trend negative    -EKG shows no ischemic changes.  Telemetry interpreted by me sinus rhythm with PVCs.   -CXR nonacute  -Telemetry monitoring   -Cardiology evaluated.  Recommends patient monitor her blood pressure as an outpatient, and follow-up with Dr. Jacinto on 10/29.    -Previously has declined PVC ablation with Dr. Armas.  -Continue Antiplatelet/BB/Statin/sl Nitro on discharge.  -Echo with EF 55 to 60%.  Grade 2 diastolic dysfunction.  -Dispo: Discharge home     Hypothyroid  -TSH 3.26  -Continue Synthroid on discharge     Hyperlipidemia   -Continue statin on discharge  -Lipid panel reviewed     Hx of recurrent C Diff      Hx fo LE DVT    The patient expressed appropriate understanding of, and agreement with the discharge recommendations, medications, and plan.     Consults this admission:  IP CONSULT TO CARDIOLOGY    Discharge Diagnosis:   Chest pain    Discharge Instruction:   Follow up appointments: Dr. Jacinto 10/29  Primary care physician: Dorie Velazquez MD within 2 weeks  Diet: cardiac diet   Activity: activity as tolerated  Disposition: Discharged to:   [x]Home, []C, []SNF, []Acute Rehab, []Hospice   Condition on discharge: Stable  Labs and Tests to be Followed up as an outpatient by PCP or Specialist:     Discharge Medications:        Medication List        START taking these medications

## 2024-10-01 NOTE — PROGRESS NOTES
4 Eyes Skin Assessment     NAME:  Brenda Ye  YOB: 1940  MEDICAL RECORD NUMBER:  [id number]    The patient is being assessed for  Admission    I agree that at least one RN has performed a thorough Head to Toe Skin Assessment on the patient. ALL assessment sites listed below have been assessed.      Areas assessed by both nurses:    Head, Face, Ears, Shoulders, Back, Chest, Arms, Elbows, Hands, Sacrum. Buttock, Coccyx, Ischium, and Legs. Feet and Heels        Does the Patient have a Wound? No noted wound(s)       Roly Prevention initiated by RN: No  Wound Care Orders initiated by RN: No    Pressure Injury (Stage 3,4, Unstageable, DTI, NWPT, and Complex wounds) if present, place Wound referral order by RN under : No    New Ostomies, if present place, Ostomy referral order under : No     Nurse 1 eSignature: Electronically signed by Lavern Ramos RN on 10/1/24 at 12:34 AM EDT    **SHARE this note so that the co-signing nurse can place an eSignature**    Nurse 2 eSignature: Electronically signed by Katherin Lorenzo RN on 10/1/24 at 12:42 AM EDT

## 2024-10-01 NOTE — H&P
V2.0  History and Physical      Name:  Brenda Ye /Age/Sex: 1940  (84 y.o. female)   MRN & CSN:  [id number] & [id number] Encounter Date/Time: 2024 9:26 PM EDT   Location:   PCP: Dorie Velazquez MD       Hospital Day: 1    Assessment and Plan:   Brenda Ye is a 84 y.o. female who presents with Chest pain    Hospital Problems             Last Modified POA    * (Principal) Chest pain 2024 Yes       Plan:    Chest pain r/o ACS.   -Concern for anginal source given risk factors.   -Hx of exercise-induced frequent complex ventricular arrhythmia   -[facility] 2017 normal coronaries   -Initial troponin trend negative    -EKG shows no ischemic changes.  Telemetry interpreted by me sinus rhythm with PVCs.   -CXR nonacute  -Telemetry monitoring   -Serial troponin level and repeat EKG in AM  -Cardiology consulted while in ED. Follows with Dr. Jacinto  -Pending labs for further risk stratification   -Antiplatelet/BB/Statin/sl Nitro on medication regimen.  -Repeat echo. Last TTE (2024)   -Admit to Med/Surg under OBSERVATION status.    Hypothyroid  -Check TSH  -Continue Synthroid    Hyperlipidemia   -Continue statin  -Pending lipid panel    Hx of recurrent C Diff     Hx fo LE DVT          Disposition:   Current Living situation: Home  Expected Disposition: Home  Estimated D/C: 10/1    Diet Adult general   DVT Prophylaxis [x] Lovenox, []  Heparin, [] SCDs, [] Ambulation,  [] Eliquis, [] Xarelto, [] Coumadin   Code Status Full code   Surrogate Decision Maker/ POA Son: Matti     Personally reviewed Lab Studies and Imaging     Discussed management of the case with ER physician who recommended admission    EKG interpreted personally and results as noted above    Imaging that was interpreted personally includes CXR and results as noted above        History from:     patient, electronic medical record, Quality of history:  good historian    History of Present Illness:     Chief Complaint: Chest pain   Brenda Ye is a

## 2024-10-01 NOTE — CARE COORDINATION
10/01/24 0702   Service Assessment   Patient Orientation Alert and Oriented   Cognition Alert   History Provided By Patient   Primary Caregiver Self   Support Systems Children;Friends/Neighbors   Patient's Healthcare Decision Maker is: Patient Declined (Legal Next of Kin Remains as Decision Maker)   PCP Verified by CM Yes   Prior Functional Level Independent in ADLs/IADLs   Current Functional Level Independent in ADLs/IADLs   Can patient return to prior living arrangement Yes   Ability to make needs known: Good   Family able to assist with home care needs: Yes   Would you like for me to discuss the discharge plan with any other family members/significant others, and if so, who? No   Financial Resources Medicare   Social/Functional History   Lives With Alone   Home Equipment None   Receives Help From Other (comment)  (Independent)   Active  Yes   Discharge Planning   Type of Residence House   Living Arrangements Alone   Current Services Prior To Admission None   Patient expects to be discharged to: House   Services At/After Discharge   Services At/After Discharge None   Mode of Transport at Discharge Self   Confirm Follow Up Transport Self   Condition of Participation: Discharge Planning   The Plan for Transition of Care is related to the following treatment goals: Plans to return home   The Patient and/or Patient Representative was provided with a Choice of Provider? Patient   The Patient and/Or Patient Representative agree with the Discharge Plan? Yes   Freedom of Choice list was provided with basic dialogue that supports the patient's individualized plan of care/goals, treatment preferences, and shares the quality data associated with the providers?  Yes     CM met with the patient to initiate discharge planning.  Patient lives at home alone, has medical coverage & PCP, stated that she is independent with ADLs, and is an active .  Patient stated that she does not require the use of any assistive

## 2024-10-02 ENCOUNTER — CARE COORDINATION (OUTPATIENT)
Dept: CASE MANAGEMENT | Age: 84
End: 2024-10-02

## 2024-10-02 NOTE — CARE COORDINATION
Care Transitions Note    Initial Call - Call within 2 business days of discharge: Yes    1st attempt to reach for Care Transition discharge call unsuccessful. HIPAA compliant message left requesting call back. UTR letter sent via [employer].      Outreach Attempts:   HIPAA compliant voicemail left for patient.   [employer] message sent.     Patient: Brenda Ye    Patient : 1940   MRN: [id number]    Reason for Admission: Chest Pain  Discharge Date: 10/1/24  RURS: No data recorded  Facility: [facility] 24-10/1/24    Last Discharge Facility       Date Complaint Diagnosis Description Type Department Provider    24 Chest Pain Chest pain, unspecified type ... ED to Hosp-Admission (Discharged) (ADMITTED) GABRIELE Philippe, Mike WOODALL MD; Luis Carlos Carballo...     Future Appointments         Provider Specialty Dept Phone    10/3/2024 10:30 AM Shauna Hyman MD General Surgery [phone]    10/29/2024 9:30 AM Lizeth Mathur, APRN - CNP Cardiology [phone]    2024 10:00 AM Dorie Velazquez MD Primary Care [phone]    1/15/2025 10:00 AM Rosi Salmon, APRN - CNP Cardiology [phone]    2025 8:30 AM Lpn, [facility] Primary Care [phone]            Challenges to be reviewed by the provider   Patient: Brenda Ye    Patient : 1940   MRN: [id number]    Reason for Admission: Chest Pain  Facility: [facility] 24-10/1/24    Please contact for scheduling of 7 day hospital follow up/ TCM appt due by 10/9/24        Method of communication with provider : chart routing PCP Clinical Pool    Plan for follow-up on next business day. 2nd attempt.     Haily Maza RN

## 2024-10-03 ENCOUNTER — CARE COORDINATION (OUTPATIENT)
Dept: CASE MANAGEMENT | Age: 84
End: 2024-10-03

## 2024-10-03 ENCOUNTER — HOSPITAL ENCOUNTER (EMERGENCY)
Age: 84
Discharge: HOME | End: 2024-10-03
Attending: STUDENT IN AN ORGANIZED HEALTH CARE EDUCATION/TRAINING PROGRAM
Payer: MEDICARE

## 2024-10-03 VITALS
SYSTOLIC BLOOD PRESSURE: 160 MMHG | OXYGEN SATURATION: 100 % | TEMPERATURE: 97.5 F | WEIGHT: 128 LBS | BODY MASS INDEX: 22.68 KG/M2 | RESPIRATION RATE: 16 BRPM | HEIGHT: 63 IN | HEART RATE: 62 BPM | DIASTOLIC BLOOD PRESSURE: 77 MMHG

## 2024-10-03 DIAGNOSIS — I10 UNCONTROLLED HYPERTENSION: Primary | ICD-10-CM

## 2024-10-03 DIAGNOSIS — I47.29 NONSUSTAINED VENTRICULAR TACHYCARDIA (HCC): ICD-10-CM

## 2024-10-03 LAB
ANION GAP SERPL CALCULATED.3IONS-SCNC: 12 MMOL/L (ref 4–16)
BASOPHILS # BLD: 0.05 K/UL
BASOPHILS NFR BLD: 1 % (ref 0–1)
BUN SERPL-MCNC: 21 MG/DL (ref 6–23)
CALCIUM SERPL-MCNC: 8.9 MG/DL (ref 8.3–10.6)
CHLORIDE SERPL-SCNC: 103 MMOL/L (ref 99–110)
CO2 SERPL-SCNC: 23 MMOL/L (ref 21–32)
CREAT SERPL-MCNC: 0.8 MG/DL (ref 0.6–1.1)
EKG ATRIAL RATE: 54 BPM
EKG DIAGNOSIS: NORMAL
EKG P AXIS: 75 DEGREES
EKG P-R INTERVAL: 220 MS
EKG Q-T INTERVAL: 460 MS
EKG QRS DURATION: 80 MS
EKG QTC CALCULATION (BAZETT): 436 MS
EKG R AXIS: 41 DEGREES
EKG T AXIS: 27 DEGREES
EKG VENTRICULAR RATE: 54 BPM
EOSINOPHIL # BLD: 0.24 K/UL
EOSINOPHILS RELATIVE PERCENT: 4 % (ref 0–3)
ERYTHROCYTE [DISTWIDTH] IN BLOOD BY AUTOMATED COUNT: 13.3 % (ref 11.7–14.9)
GFR, ESTIMATED: 72 ML/MIN/1.73M2
GLUCOSE SERPL-MCNC: 104 MG/DL (ref 70–99)
HCT VFR BLD AUTO: 37.3 % (ref 37–47)
HGB BLD-MCNC: 12.3 G/DL (ref 12.5–16)
IMM GRANULOCYTES # BLD AUTO: 0 K/UL
IMM GRANULOCYTES NFR BLD: 0 %
LYMPHOCYTES NFR BLD: 1.83 K/UL
LYMPHOCYTES RELATIVE PERCENT: 33 % (ref 24–44)
MAGNESIUM SERPL-MCNC: 2 MG/DL (ref 1.8–2.4)
MCH RBC QN AUTO: 30.3 PG (ref 27–31)
MCHC RBC AUTO-ENTMCNC: 33 G/DL (ref 32–36)
MCV RBC AUTO: 91.9 FL (ref 78–100)
MONOCYTES NFR BLD: 0.34 K/UL
MONOCYTES NFR BLD: 6 % (ref 0–4)
NEUTROPHILS NFR BLD: 56 % (ref 36–66)
NEUTS SEG NFR BLD: 3.16 K/UL
PLATELET # BLD AUTO: 181 K/UL (ref 140–440)
PMV BLD AUTO: 10.6 FL (ref 7.5–11.1)
POTASSIUM SERPL-SCNC: 4.1 MMOL/L (ref 3.5–5.1)
RBC # BLD AUTO: 4.06 M/UL (ref 4.2–5.4)
SODIUM SERPL-SCNC: 138 MMOL/L (ref 135–145)
TROPONIN I SERPL HS-MCNC: 13 NG/L (ref 0–13)
WBC OTHER # BLD: 5.6 K/UL (ref 4–10.5)

## 2024-10-03 PROCEDURE — 83735 ASSAY OF MAGNESIUM: CPT

## 2024-10-03 PROCEDURE — 84484 ASSAY OF TROPONIN QUANT: CPT

## 2024-10-03 PROCEDURE — 93005 ELECTROCARDIOGRAM TRACING: CPT | Performed by: STUDENT IN AN ORGANIZED HEALTH CARE EDUCATION/TRAINING PROGRAM

## 2024-10-03 PROCEDURE — 99284 EMERGENCY DEPT VISIT MOD MDM: CPT

## 2024-10-03 PROCEDURE — 93010 ELECTROCARDIOGRAM REPORT: CPT | Performed by: INTERNAL MEDICINE

## 2024-10-03 PROCEDURE — 80048 BASIC METABOLIC PNL TOTAL CA: CPT

## 2024-10-03 PROCEDURE — 85025 COMPLETE CBC W/AUTO DIFF WBC: CPT

## 2024-10-03 ASSESSMENT — PAIN - FUNCTIONAL ASSESSMENT: PAIN_FUNCTIONAL_ASSESSMENT: 0-10

## 2024-10-03 ASSESSMENT — PAIN SCALES - GENERAL: PAINLEVEL_OUTOF10: 0

## 2024-10-03 NOTE — ED NOTES
Pt reports BP reading high this morning. Felt weak. During triage continues to talk about high reading and questioning it being a sign of stroke. Pt made aware of high BP playing factor in risk but does not mean you are having a stoke. Pt alert and oriented. Reports driving self here. Had a FU appt this morning but came to ED d/t BP.

## 2024-10-03 NOTE — ED PROVIDER NOTES
Activity: Insufficiently Active (6/21/2024)    Exercise Vital Sign     Days of Exercise per Week: 7 days     Minutes of Exercise per Session: 20 min   Stress: Not on file   Social Connections: Not on file   Intimate Partner Violence: Not on file   Housing Stability: Low Risk  (9/30/2024)    Housing Stability Vital Sign     Unable to Pay for Housing in the Last Year: No     Number of Times Moved in the Last Year: 1     Homeless in the Last Year: No     Medications:   No current facility-administered medications for this encounter.     Current Outpatient Medications   Medication Sig Dispense Refill    carvedilol (COREG) 3.125 MG tablet Take 1 tablet by mouth 2 times daily (with meals) 60 tablet 3    pantoprazole (PROTONIX) 40 MG tablet TAKE 1 TABLET BY MOUTH EVERY DAY BEFORE BREAKFAST (Patient not taking: Reported on 9/30/2024) 90 tablet 1    aluminum hydroxide-magnesium carbonate (GENATON)  MG/15ML suspension Take 15 mLs by mouth 4 times daily (with meals and nightly) As needed      fluticasone (FLONASE) 50 MCG/ACT nasal spray 2 sprays by Each Nostril route daily as needed for Rhinitis or Allergies      docusate sodium (COLACE) 100 MG capsule Take 1 capsule by mouth daily as needed for Constipation (Patient taking differently: Take 1 capsule by mouth 2 times daily) 30 capsule 5    benazepril (LOTENSIN) 10 MG tablet Take 1 tablet by mouth daily 90 tablet 1    atorvastatin (LIPITOR) 10 MG tablet TAKE 1 TABLET BY MOUTH EVERY DAY 90 tablet 1    levothyroxine (SYNTHROID) 50 MCG tablet TAKE 1 TABLET BY MOUTH EVERY DAY 90 tablet 1    Magnesium 125 MG CAPS Take 250 mg by mouth daily (Patient taking differently: Take 125 mg by mouth daily) 90 capsule 1    aspirin 81 MG tablet Take 1 tablet by mouth daily Every other day.      lactobacillus (CULTURELLE) capsule Take 1 capsule by mouth 2 times daily (with meals) 30 capsule 0    Coenzyme Q10 (CO Q 10) 100 MG CAPS Take by mouth daily       Cholecalciferol (VITAMIN D3) 2000

## 2024-10-03 NOTE — CARE COORDINATION
Care Transitions Note    Initial Call - Call within 2 business days of discharge: Yes    2nd unsuccessful attempt to reach for Care Transition discharge call as Patient noted to be back at [facility] ED. CT program closed per protocol.     Outreach Attempts:   Unable to leave message.     Patient: Brenda Ye    Patient : 1940   MRN: [id number]    Reason for Admission: Chest Pain  Discharge Date: 10/1/24  RURS: No data recorded  Facility: [facility] 24-10/1/24     Last Discharge Facility       Date Complaint Diagnosis Description Type Department Provider    10/3/24 Hypertension Uncontrolled hypertension ED [facility] ED Leeanne Santiago,           Future Appointments         Provider Specialty Dept Phone    10/3/2024 10:30 AM Shauna Hyman MD General Surgery [phone]    10/29/2024 9:30 AM Lizeth Mathur APRN - CNP Cardiology [phone]    2024 10:00 AM Dorie Vealzquez MD Primary Care [phone]    1/15/2025 10:00 AM Rosi Salmon APRN - CNP Cardiology [phone]    2025 8:30 AM Lpn, [facility] Primary Care [phone]          Haily Maza RN

## 2024-10-06 ENCOUNTER — HOSPITAL ENCOUNTER (EMERGENCY)
Age: 84
Discharge: HOME | End: 2024-10-06
Attending: EMERGENCY MEDICINE
Payer: MEDICARE

## 2024-10-06 VITALS
HEIGHT: 63 IN | OXYGEN SATURATION: 100 % | HEART RATE: 67 BPM | BODY MASS INDEX: 22.86 KG/M2 | SYSTOLIC BLOOD PRESSURE: 176 MMHG | RESPIRATION RATE: 16 BRPM | WEIGHT: 129 LBS | DIASTOLIC BLOOD PRESSURE: 64 MMHG | TEMPERATURE: 98.6 F

## 2024-10-06 DIAGNOSIS — I10 ASYMPTOMATIC HYPERTENSION: Primary | ICD-10-CM

## 2024-10-06 PROCEDURE — 99283 EMERGENCY DEPT VISIT LOW MDM: CPT

## 2024-10-06 PROCEDURE — 6370000000 HC RX 637 (ALT 250 FOR IP): Performed by: EMERGENCY MEDICINE

## 2024-10-06 RX ORDER — LISINOPRIL 20 MG/1
20 TABLET ORAL ONCE
Status: COMPLETED | OUTPATIENT
Start: 2024-10-06 | End: 2024-10-06

## 2024-10-06 RX ADMIN — LISINOPRIL 20 MG: 20 TABLET ORAL at 11:05

## 2024-10-06 ASSESSMENT — PAIN - FUNCTIONAL ASSESSMENT: PAIN_FUNCTIONAL_ASSESSMENT: NONE - DENIES PAIN

## 2024-10-06 NOTE — ED PROVIDER NOTES
Emergency Department Encounter  Location: [facility] EMERGENCY DEPARTMENT    Patient: Brenda Ye  MRN: [id number]  : 1940  Date of evaluation: 10/6/2024  ED Provider: Rocky Bravo DO, FACEP    Chief Complaint:    Hypertension (States this is the 4th time she has been here recently. States at home her blood pressure was elevated. Is concerned her cuff isn't working right. Denies pain or discomfort. No distress noted.)    [place]:  Brenda Ye is a 84 y.o. female that presents to the emergency department with complaints of high blood pressure.  The patient is anxious because her blood pressure was high on her monitor at home.  It was in the 190s.  Upon arrival to the ER the patient was 194/87 however on recheck is now 157/68.  She denies any headache, chest pain, shortness of breath, swelling in her legs or other abnormality.  The patient was seen on Friday for similar problem.  The patient had been ordered on Coreg by Dr. Ishaan Del Angel but she has not taken that because her heart rate does have a tendency to drop down into the 40s periodically and Dr. Pierson was concerned that this may make her heart rate dropped further.  She has not started the Coreg subsequently.  She has not taken her BenzePrO this morning.  She is on 10 mg of that daily.  She states she was on 20 mg prior to that but was taken off of the 20 mg dose because her blood pressure was a little low.  She is denying any problems or concerns.  She denies fever chills or other problem.    ROS:  At least 4 systems reviewed and otherwise acutely negative except as in the [place].  Negative for fever or chills  Negative for chest pain  Negative for shortness of breath  Negative for nausea vomiting diarrhea or constipation    Past Medical History:   Diagnosis Date    Acute deep vein thrombosis (DVT) of distal vein of left lower extremity (HCC) 10/23/2016    DVT involving left peroneal, posterior and anterior tibial veins 10/16 Treated for at

## 2024-10-06 NOTE — DISCHARGE INSTRUCTIONS
Use 20 mg of benazepril (two 10 mg tablets) daily starting tomorrow morning.  Follow-up with your primary caregiver as scheduled.  Return to the ER for headache shortness of breath chest pain or other problems.

## 2024-10-06 NOTE — ED TRIAGE NOTES
Arrived ambulatory to room 4 for triage. Tolerated without difficulty. Bed in lowest position. Call light given. Gowned for exam. BP & PoX applied.

## 2024-10-06 NOTE — ED NOTES
Discharge instructions reviewed with patient. Reviewed medications with patient. No additional questions asked.  Voiced understanding. Encouraged patient to follow up as discussed by the ED physician. Reviewed how to access [employer] at discharged with patient. Encourage to sign up either on their smartphone or on the computer to be able to review the information form today's and future visits. Voiced understanding. No additional questions asked. Patient ambulatory without difficulty. Physician aware.

## 2024-10-07 ENCOUNTER — TELEPHONE (OUTPATIENT)
Dept: CARDIOLOGY CLINIC | Age: 84
End: 2024-10-07

## 2024-10-08 ENCOUNTER — OFFICE VISIT (OUTPATIENT)
Age: 84
End: 2024-10-08
Payer: MEDICARE

## 2024-10-08 VITALS
WEIGHT: 130 LBS | TEMPERATURE: 98.1 F | BODY MASS INDEX: 23.03 KG/M2 | RESPIRATION RATE: 16 BRPM | DIASTOLIC BLOOD PRESSURE: 68 MMHG | HEART RATE: 60 BPM | OXYGEN SATURATION: 99 % | SYSTOLIC BLOOD PRESSURE: 148 MMHG

## 2024-10-08 DIAGNOSIS — M81.0 AGE-RELATED OSTEOPOROSIS WITHOUT CURRENT PATHOLOGICAL FRACTURE: ICD-10-CM

## 2024-10-08 DIAGNOSIS — I47.20 VENTRICULAR TACHYCARDIA (HCC): ICD-10-CM

## 2024-10-08 DIAGNOSIS — I10 ESSENTIAL HYPERTENSION: Primary | ICD-10-CM

## 2024-10-08 DIAGNOSIS — E03.9 ACQUIRED HYPOTHYROIDISM: ICD-10-CM

## 2024-10-08 DIAGNOSIS — K21.9 GASTROESOPHAGEAL REFLUX DISEASE WITHOUT ESOPHAGITIS: ICD-10-CM

## 2024-10-08 DIAGNOSIS — E78.2 MIXED HYPERLIPIDEMIA: ICD-10-CM

## 2024-10-08 PROCEDURE — 99214 OFFICE O/P EST MOD 30 MIN: CPT | Performed by: INTERNAL MEDICINE

## 2024-10-08 PROCEDURE — G0008 ADMIN INFLUENZA VIRUS VAC: HCPCS | Performed by: INTERNAL MEDICINE

## 2024-10-08 PROCEDURE — 1124F ACP DISCUSS-NO DSCNMKR DOCD: CPT | Performed by: INTERNAL MEDICINE

## 2024-10-08 PROCEDURE — 1036F TOBACCO NON-USER: CPT | Performed by: INTERNAL MEDICINE

## 2024-10-08 PROCEDURE — 1090F PRES/ABSN URINE INCON ASSESS: CPT | Performed by: INTERNAL MEDICINE

## 2024-10-08 PROCEDURE — G8420 CALC BMI NORM PARAMETERS: HCPCS | Performed by: INTERNAL MEDICINE

## 2024-10-08 PROCEDURE — G8482 FLU IMMUNIZE ORDER/ADMIN: HCPCS | Performed by: INTERNAL MEDICINE

## 2024-10-08 PROCEDURE — 90653 IIV ADJUVANT VACCINE IM: CPT | Performed by: INTERNAL MEDICINE

## 2024-10-08 PROCEDURE — G8427 DOCREV CUR MEDS BY ELIG CLIN: HCPCS | Performed by: INTERNAL MEDICINE

## 2024-10-08 RX ORDER — LEVOTHYROXINE SODIUM 50 UG/1
TABLET ORAL
Qty: 90 TABLET | Refills: 1 | Status: CANCELLED | OUTPATIENT
Start: 2024-10-08

## 2024-10-08 RX ORDER — ATORVASTATIN CALCIUM 10 MG/1
TABLET, FILM COATED ORAL
Qty: 90 TABLET | Refills: 1 | Status: SHIPPED | OUTPATIENT
Start: 2024-10-08

## 2024-10-08 RX ORDER — LEVOTHYROXINE SODIUM 50 UG/1
TABLET ORAL
Qty: 90 TABLET | Refills: 1 | Status: SHIPPED | OUTPATIENT
Start: 2024-10-08

## 2024-10-08 RX ORDER — ATORVASTATIN CALCIUM 10 MG/1
TABLET, FILM COATED ORAL
Qty: 90 TABLET | Refills: 1 | Status: CANCELLED | OUTPATIENT
Start: 2024-10-08

## 2024-10-08 RX ORDER — HYDRALAZINE HYDROCHLORIDE 10 MG/1
TABLET, FILM COATED ORAL
Qty: 90 TABLET | Refills: 3 | Status: SHIPPED | OUTPATIENT
Start: 2024-10-08

## 2024-10-08 RX ORDER — BENAZEPRIL HYDROCHLORIDE 20 MG/1
20 TABLET ORAL DAILY
Qty: 30 TABLET | Refills: 1 | Status: SHIPPED | OUTPATIENT
Start: 2024-10-08

## 2024-10-08 RX ORDER — BENAZEPRIL HYDROCHLORIDE 10 MG/1
10 TABLET ORAL DAILY
Qty: 90 TABLET | Refills: 1 | Status: CANCELLED | OUTPATIENT
Start: 2024-10-08

## 2024-10-08 ASSESSMENT — PATIENT HEALTH QUESTIONNAIRE - PHQ9
1. LITTLE INTEREST OR PLEASURE IN DOING THINGS: SEVERAL DAYS
SUM OF ALL RESPONSES TO PHQ QUESTIONS 1-9: 2
SUM OF ALL RESPONSES TO PHQ QUESTIONS 1-9: 2
SUM OF ALL RESPONSES TO PHQ9 QUESTIONS 1 & 2: 2
SUM OF ALL RESPONSES TO PHQ QUESTIONS 1-9: 2
SUM OF ALL RESPONSES TO PHQ QUESTIONS 1-9: 2
2. FEELING DOWN, DEPRESSED OR HOPELESS: SEVERAL DAYS

## 2024-10-08 NOTE — PROGRESS NOTES
Brenda Ye  Patient's  is 1940  Seen in office on 10/8/2024      SUBJECTIVE:  Brenda brink 84 y.o.year old female presents today   Chief Complaint   Patient presents with    Follow-up     Went to ED 4 times, last visit was 10-6-24    Medication Refill     Pt went to ER x 4 times since last time  First time for wound at left knee area where dermatologist Dr. Hernandez had biopsied.  There was nothing serious.  BP was high at home and went to ER x 3.  Highest blood pressure was 195 over something  Pt was told to increase coreg but she did not do that as HR was low  Last ER visit was told to increase benzapril to 20 mg daily     Pt states she is feeling better now.  No chest pain.  No shortness of breath no cough or sputum production.  No abdominal pain.  No nausea vomiting or diarrhea.    Patient had barium swallow which was normal.  Patient decided not to take the Protonix.    Taking medications regularly. No side effects noted.    Review of Systems  Review of system normal except as in HPI  OBJECTIVE: BP (!) 148/68 (Site: Right Upper Arm, Position: Sitting, Cuff Size: Medium Adult)   Pulse 60   Temp 98.1 °F (36.7 °C) (Oral)   Resp 16   Wt 59 kg (130 lb)   SpO2 99%   BMI 23.03 kg/m²     Wt Readings from Last 3 Encounters:   10/08/24 59 kg (130 lb)   10/06/24 58.5 kg (129 lb)   10/03/24 58.1 kg (128 lb)      GENERAL: - Alert, oriented, pleasant, in no apparent distress.    HEENT: - Conjunctiva pink, no scleral icterus. ENT clear.  NECK: -Supple.  No jugular venous distention noted. No masses felt,  CARDIOVASCULAR: - Normal S1 and S2    PULMONARY: - No respiratory distress.  No wheezes or rales.    ABDOMEN: - Soft and non-tender,no masses  ororganomegaly.  EXTREMITIES: - No cyanosis, clubbing, or significant edema.  SKIN: Skin is warm and dry.   NEUROLOGICAL: - Cranial nerves II through XII are grossly intact.      IMPRESSION:    Encounter Diagnoses   Name Primary?    Essential hypertension Yes    Mixed

## 2024-10-08 NOTE — PROGRESS NOTES
Vaccine Information Sheet, \"Influenza - Inactivated\"  given to Brenda Ye, or parent/legal guardian of  Brenda Ye and verbalized understanding.    Patient responses:    Have you ever had a reaction to a flu vaccine? No  Do you have any current illness?  No  Have you ever had Guillian Robert Syndrome?  No  Do you have a serious allergy to any of the follow: Neomycin, Polymyxin, Thimerosal, eggs or egg products? No    Flu vaccine given per order. Please see immunization tab.    Risks and benefits explained.  Current VIS given.

## 2024-10-30 RX ORDER — BENAZEPRIL HYDROCHLORIDE 20 MG/1
20 TABLET ORAL DAILY
Qty: 90 TABLET | Refills: 1 | Status: SHIPPED | OUTPATIENT
Start: 2024-10-30

## 2024-11-27 ENCOUNTER — OFFICE VISIT (OUTPATIENT)
Dept: CARDIOLOGY CLINIC | Age: 84
End: 2024-11-27

## 2024-11-27 VITALS
BODY MASS INDEX: 23.25 KG/M2 | DIASTOLIC BLOOD PRESSURE: 72 MMHG | SYSTOLIC BLOOD PRESSURE: 136 MMHG | HEART RATE: 55 BPM | HEIGHT: 63 IN | WEIGHT: 131.2 LBS

## 2024-11-27 DIAGNOSIS — I49.3 PVCS (PREMATURE VENTRICULAR CONTRACTIONS): Primary | ICD-10-CM

## 2024-11-27 DIAGNOSIS — I10 ESSENTIAL HYPERTENSION: ICD-10-CM

## 2024-11-27 ASSESSMENT — ENCOUNTER SYMPTOMS
ABDOMINAL PAIN: 0
PHOTOPHOBIA: 0
SINUS PAIN: 0
SINUS PRESSURE: 0
ABDOMINAL DISTENTION: 0
BACK PAIN: 0
COLOR CHANGE: 0
COUGH: 0
SHORTNESS OF BREATH: 0

## 2024-11-27 NOTE — PROGRESS NOTES
Electrophysiology FU Note      Reason for consultation: PVC    Chief complaint : Intermittent palpitations    Referring physician:        Primary care physician: Dorie Velazquez MD      History of Present Illness:     This visit 11/27/2024  Patient is here for follow-up on PVCs.  Patient denies cardiac complaints.  She denies chest pain, shortness of breath, lightheadedness, dizziness, edema or syncope.  She does report that she has intermittent palpitations.  She complains of some intermittent lower leg edema.  She denies smoking.  She does drink alcohol on occasion.  She denies caffeine.    Previous visit (4/26/2024)      Chief Complaint   Patient presents with    6 Month Follow-Up     Patient c/o palpitations often.   Patient states she fell last week while being in garden and has an open sore on her RT shin.  She did not pass out. She slipped had a mechanical fall.  Patient c/o she has some abrasion on right leg and right arm, She has been using neosporin.  Patient denies use of tobacco.   Patient states she consumes \"1 beer\" in moderation.   Patient denies caffeine consumption.   Patient states she goes to the [facility]x [facility]. Patient does not have any upcoming surgeries or procedures.           Previous visit:  (10/27/2023)      Chief Complaint   Patient presents with    6 Month Follow-Up     No new cardiac symptoms - Denies chest pain, shortness of breath, palpitations, syncope or presyncope, edema            Previous visit:  (4/21/2023)      Chief Complaint   Patient presents with    Results     Follow up for echo results.  Occ palp and edema bilat.     Palpitations    Edema           Previous visit: (4/7/2023)      Chief Complaint   Patient presents with    Palpitations     Pt can feel PVCs     Chest Pain     No chest pain, pt states that shoulder started aching this morning, ache went down arm into finger tips.            Previous visit:  (4/1/2022)      Chief

## 2024-11-27 NOTE — PATIENT INSTRUCTIONS
Please be informed that if you contact our office outside of normal business hours the physician on call cannot help with any scheduling or rescheduling issues, procedure instruction questions or any type of medication issue.    We advise you for any urgent/emergency that you go to the nearest emergency room!    PLEASE CALL OUR OFFICE DURING NORMAL BUSINESS HOURS    Monday - Friday   8 am to 5 pm    [place]: [phone]    [place]: [phone]    [place]:  [phone]  Thank you for allowing us to care for you today!   We want to ensure we can follow your treatment plan and we strive to give you the best outcomes and experience possible.   If you ever have a life threatening emergency and call 911 - for an ambulance (EMS)   Our providers can only care for you at:   [facility] or [facility].   Even if you have someone take you or you drive yourself we can only care for you in a [facility] facility. Our providers are not setup at the other healthcare locations!   **It is YOUR responsibilty to bring medication bottles and/or updated medication list to EACH APPOINTMENT. This will allow us to better serve you and all your healthcare needs**  We are committed to providing you the best care possible.    If you receive a survey after visiting one of our offices, please take time to share your experience concerning your physician office visit.  These surveys are confidential and no health information about you is shared.    We are eager to improve for you and we are counting on your feedback to help make that happen.        .

## 2024-12-04 ENCOUNTER — OFFICE VISIT (OUTPATIENT)
Age: 84
End: 2024-12-04
Payer: MEDICARE

## 2024-12-04 VITALS
SYSTOLIC BLOOD PRESSURE: 130 MMHG | BODY MASS INDEX: 23.03 KG/M2 | RESPIRATION RATE: 12 BRPM | WEIGHT: 130 LBS | OXYGEN SATURATION: 99 % | TEMPERATURE: 98.3 F | HEART RATE: 56 BPM | DIASTOLIC BLOOD PRESSURE: 60 MMHG

## 2024-12-04 DIAGNOSIS — M81.0 AGE-RELATED OSTEOPOROSIS WITHOUT CURRENT PATHOLOGICAL FRACTURE: ICD-10-CM

## 2024-12-04 DIAGNOSIS — K21.9 GASTROESOPHAGEAL REFLUX DISEASE WITHOUT ESOPHAGITIS: ICD-10-CM

## 2024-12-04 DIAGNOSIS — I47.20 VENTRICULAR TACHYCARDIA (HCC): ICD-10-CM

## 2024-12-04 DIAGNOSIS — E78.2 MIXED HYPERLIPIDEMIA: ICD-10-CM

## 2024-12-04 DIAGNOSIS — E03.9 ACQUIRED HYPOTHYROIDISM: ICD-10-CM

## 2024-12-04 DIAGNOSIS — I10 ESSENTIAL HYPERTENSION: Primary | ICD-10-CM

## 2024-12-04 DIAGNOSIS — I49.3 PVCS (PREMATURE VENTRICULAR CONTRACTIONS): ICD-10-CM

## 2024-12-04 DIAGNOSIS — R73.9 HYPERGLYCEMIA: ICD-10-CM

## 2024-12-04 DIAGNOSIS — R00.2 HEART PALPITATIONS: ICD-10-CM

## 2024-12-04 PROCEDURE — 99214 OFFICE O/P EST MOD 30 MIN: CPT | Performed by: INTERNAL MEDICINE

## 2024-12-04 PROCEDURE — G8420 CALC BMI NORM PARAMETERS: HCPCS | Performed by: INTERNAL MEDICINE

## 2024-12-04 PROCEDURE — 1159F MED LIST DOCD IN RCRD: CPT | Performed by: INTERNAL MEDICINE

## 2024-12-04 PROCEDURE — G8427 DOCREV CUR MEDS BY ELIG CLIN: HCPCS | Performed by: INTERNAL MEDICINE

## 2024-12-04 PROCEDURE — 1124F ACP DISCUSS-NO DSCNMKR DOCD: CPT | Performed by: INTERNAL MEDICINE

## 2024-12-04 PROCEDURE — 1090F PRES/ABSN URINE INCON ASSESS: CPT | Performed by: INTERNAL MEDICINE

## 2024-12-04 PROCEDURE — G8482 FLU IMMUNIZE ORDER/ADMIN: HCPCS | Performed by: INTERNAL MEDICINE

## 2024-12-04 PROCEDURE — 1036F TOBACCO NON-USER: CPT | Performed by: INTERNAL MEDICINE

## 2024-12-04 ASSESSMENT — PATIENT HEALTH QUESTIONNAIRE - PHQ9
SUM OF ALL RESPONSES TO PHQ QUESTIONS 1-9: 0
1. LITTLE INTEREST OR PLEASURE IN DOING THINGS: NOT AT ALL
SUM OF ALL RESPONSES TO PHQ QUESTIONS 1-9: 0
2. FEELING DOWN, DEPRESSED OR HOPELESS: NOT AT ALL
SUM OF ALL RESPONSES TO PHQ QUESTIONS 1-9: 0
SUM OF ALL RESPONSES TO PHQ QUESTIONS 1-9: 0
SUM OF ALL RESPONSES TO PHQ9 QUESTIONS 1 & 2: 0

## 2024-12-04 NOTE — PROGRESS NOTES
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 Tumor closely approximate a peripheral tissue edge    Osteoarthritis of cervical spine 6/7/2016    Ovarian cyst     Papanicolaou smear 09/14/2016    Dr. Stevens    PVCs (premature ventricular contractions)     symptomatic frequent PVCs, sees Dr. Del Angel    Ventricular tachycardia (HCC) 8/1/2017    Exercise induced 8/1/17     Past Surgical History:   Procedure Laterality Date    APPENDECTOMY      BREAST BIOPSY      CATARACT REMOVAL Bilateral     COLONOSCOPY  12/3/13,2-28-17 2.28.17 no need for follow up due to pt's advanced age per Dr. Troncoso.    DIAGNOSTIC CARDIAC CATH LAB PROCEDURE  08/03/2017    Normal coronaries, EF 70%.    HYSTERECTOMY (CERVIX STATUS UNKNOWN)  1961    LIVER BIOPSY  10/09    steasosis    UPPER GASTROINTESTINAL ENDOSCOPY  11/02/2017    Dr. Troncoso     Social History     Tobacco Use    Smoking status: Never    Smokeless tobacco: Never   Substance Use Topics    Alcohol use: Yes     Comment: 1 beers per day or 2 glasses of \"bubbly\"/day,       LAB REVIEW:  CBC:   Lab Results   Component Value Date/Time    WBC 5.6 10/03/2024 09:05 AM    HGB 12.3 10/03/2024 09:05 AM    HCT 37.3 10/03/2024 09:05 AM     10/03/2024 09:05 AM     Lipids:   Lab Results   Component Value Date    HDL 58 10/01/2024    LDLDIRECT 64 12/04/2020    TRIGLYCFAST 61 06/03/2024    CHOLFAST 177 06/03/2024     Renal:   Lab Results   Component Value Date/Time    BUN 21 10/03/2024 09:05 AM    CREATININE 0.8 10/03/2024 09:05 AM     10/03/2024 09:05 AM    K 4.1 10/03/2024 09:05 AM    ALKPHOS 56 10/01/2024 06:15 AM    ALT 16 10/01/2024 06:15 AM    AST 24 10/01/2024 06:15 AM    GLUCOSE 104 10/03/2024 09:05 AM    GLUF

## 2025-01-15 ENCOUNTER — OFFICE VISIT (OUTPATIENT)
Dept: CARDIOLOGY CLINIC | Age: 85
End: 2025-01-15
Payer: MEDICARE

## 2025-01-15 VITALS
SYSTOLIC BLOOD PRESSURE: 134 MMHG | DIASTOLIC BLOOD PRESSURE: 62 MMHG | HEART RATE: 52 BPM | BODY MASS INDEX: 23.04 KG/M2 | WEIGHT: 130 LBS | HEIGHT: 63 IN

## 2025-01-15 DIAGNOSIS — I49.3 PVCS (PREMATURE VENTRICULAR CONTRACTIONS): ICD-10-CM

## 2025-01-15 DIAGNOSIS — I10 ESSENTIAL HYPERTENSION: Primary | ICD-10-CM

## 2025-01-15 DIAGNOSIS — E78.2 MIXED HYPERLIPIDEMIA: ICD-10-CM

## 2025-01-15 PROCEDURE — 1090F PRES/ABSN URINE INCON ASSESS: CPT | Performed by: NURSE PRACTITIONER

## 2025-01-15 PROCEDURE — 1036F TOBACCO NON-USER: CPT | Performed by: NURSE PRACTITIONER

## 2025-01-15 PROCEDURE — 1124F ACP DISCUSS-NO DSCNMKR DOCD: CPT | Performed by: NURSE PRACTITIONER

## 2025-01-15 PROCEDURE — G8420 CALC BMI NORM PARAMETERS: HCPCS | Performed by: NURSE PRACTITIONER

## 2025-01-15 PROCEDURE — G8428 CUR MEDS NOT DOCUMENT: HCPCS | Performed by: NURSE PRACTITIONER

## 2025-01-15 PROCEDURE — 99214 OFFICE O/P EST MOD 30 MIN: CPT | Performed by: NURSE PRACTITIONER

## 2025-01-15 ASSESSMENT — ENCOUNTER SYMPTOMS
SHORTNESS OF BREATH: 0
ORTHOPNEA: 0

## 2025-01-15 NOTE — PROGRESS NOTES
months    Signed:  Rosi Salmon, JESSENIA - CNP, 1/15/2025, 10:08 AM    An electronic signature was used to authenticate this note.    Please note this report has been partially produced using speech recognition software and may contain errors related to that system including errors in grammar, punctuation, and spelling, as well as words and phrases that may be inappropriate. If there are any questions or concerns please feel free to contact the dictating provider for clarification.

## 2025-02-28 ENCOUNTER — HOSPITAL ENCOUNTER (OUTPATIENT)
Age: 85
Discharge: HOME | End: 2025-02-28
Payer: MEDICARE

## 2025-02-28 DIAGNOSIS — E78.2 MIXED HYPERLIPIDEMIA: ICD-10-CM

## 2025-02-28 DIAGNOSIS — E03.9 ACQUIRED HYPOTHYROIDISM: ICD-10-CM

## 2025-02-28 DIAGNOSIS — R73.9 HYPERGLYCEMIA: ICD-10-CM

## 2025-02-28 LAB
ALBUMIN SERPL-MCNC: 4.2 G/DL (ref 3.4–5)
ALBUMIN/GLOB SERPL: 1.5 {RATIO} (ref 1.1–2.2)
ALP SERPL-CCNC: 81 U/L (ref 40–129)
ALT SERPL-CCNC: 22 U/L (ref 10–40)
ANION GAP SERPL CALCULATED.3IONS-SCNC: 10 MMOL/L (ref 4–16)
AST SERPL-CCNC: 30 U/L (ref 15–37)
BILIRUB SERPL-MCNC: 0.5 MG/DL (ref 0–1)
BUN SERPL-MCNC: 25 MG/DL (ref 6–23)
CALCIUM SERPL-MCNC: 9.7 MG/DL (ref 8.3–10.6)
CHLORIDE SERPL-SCNC: 106 MMOL/L (ref 99–110)
CHOLEST SERPL-MCNC: 150 MG/DL (ref 125–199)
CO2 SERPL-SCNC: 25 MMOL/L (ref 21–32)
CREAT SERPL-MCNC: 0.8 MG/DL (ref 0.6–1.1)
EST. AVERAGE GLUCOSE BLD GHB EST-MCNC: 121 MG/DL
GFR, ESTIMATED: 72 ML/MIN/1.73M2
GLUCOSE SERPL-MCNC: 89 MG/DL (ref 74–99)
HBA1C MFR BLD: 5.8 % (ref 4.2–6.3)
HDLC SERPL-MCNC: 72 MG/DL
LDLC SERPL CALC-MCNC: 68 MG/DL
POTASSIUM SERPL-SCNC: 4.8 MMOL/L (ref 3.5–5.1)
PROT SERPL-MCNC: 7 G/DL (ref 6.4–8.2)
SODIUM SERPL-SCNC: 141 MMOL/L (ref 135–145)
TRIGL SERPL-MCNC: 50 MG/DL
TSH SERPL DL<=0.05 MIU/L-ACNC: 2.96 UIU/ML (ref 0.27–4.2)

## 2025-02-28 PROCEDURE — 80053 COMPREHEN METABOLIC PANEL: CPT

## 2025-02-28 PROCEDURE — 36415 COLL VENOUS BLD VENIPUNCTURE: CPT

## 2025-02-28 PROCEDURE — 80061 LIPID PANEL: CPT

## 2025-02-28 PROCEDURE — 83036 HEMOGLOBIN GLYCOSYLATED A1C: CPT

## 2025-02-28 PROCEDURE — 84443 ASSAY THYROID STIM HORMONE: CPT

## 2025-03-03 ENCOUNTER — HOSPITAL ENCOUNTER (EMERGENCY)
Age: 85
Discharge: HOME | End: 2025-03-03
Attending: EMERGENCY MEDICINE
Payer: MEDICARE

## 2025-03-03 ENCOUNTER — APPOINTMENT (OUTPATIENT)
Dept: CT IMAGING | Age: 85
End: 2025-03-03
Payer: MEDICARE

## 2025-03-03 VITALS
OXYGEN SATURATION: 100 % | TEMPERATURE: 98.4 F | RESPIRATION RATE: 25 BRPM | DIASTOLIC BLOOD PRESSURE: 67 MMHG | HEIGHT: 63 IN | BODY MASS INDEX: 22.86 KG/M2 | SYSTOLIC BLOOD PRESSURE: 171 MMHG | HEART RATE: 57 BPM | WEIGHT: 129 LBS

## 2025-03-03 DIAGNOSIS — R10.13 EPIGASTRIC PAIN: ICD-10-CM

## 2025-03-03 DIAGNOSIS — R42 DIZZINESS: Primary | ICD-10-CM

## 2025-03-03 LAB
ALBUMIN SERPL-MCNC: 4.2 G/DL (ref 3.4–5)
ALBUMIN/GLOB SERPL: 1.4 {RATIO} (ref 1.1–2.2)
ALP SERPL-CCNC: 76 U/L (ref 40–129)
ALT SERPL-CCNC: 23 U/L (ref 10–40)
ANION GAP SERPL CALCULATED.3IONS-SCNC: 12 MMOL/L (ref 4–16)
AST SERPL-CCNC: 30 U/L (ref 15–37)
BASOPHILS # BLD: 0.06 K/UL
BASOPHILS NFR BLD: 1 % (ref 0–1)
BILIRUB SERPL-MCNC: 0.6 MG/DL (ref 0–1)
BUN SERPL-MCNC: 28 MG/DL (ref 6–23)
CALCIUM SERPL-MCNC: 9.4 MG/DL (ref 8.3–10.6)
CHLORIDE SERPL-SCNC: 103 MMOL/L (ref 99–110)
CO2 SERPL-SCNC: 24 MMOL/L (ref 21–32)
CREAT SERPL-MCNC: 0.9 MG/DL (ref 0.6–1.1)
EOSINOPHIL # BLD: 0.42 K/UL
EOSINOPHILS RELATIVE PERCENT: 5 % (ref 0–3)
ERYTHROCYTE [DISTWIDTH] IN BLOOD BY AUTOMATED COUNT: 13.2 % (ref 11.7–14.9)
GFR, ESTIMATED: 62 ML/MIN/1.73M2
GLUCOSE SERPL-MCNC: 101 MG/DL (ref 74–99)
HCT VFR BLD AUTO: 39.1 % (ref 37–47)
HGB BLD-MCNC: 13.2 G/DL (ref 12.5–16)
IMM GRANULOCYTES # BLD AUTO: 0.02 K/UL
IMM GRANULOCYTES NFR BLD: 0 %
LIPASE SERPL-CCNC: 43 U/L (ref 13–60)
LYMPHOCYTES NFR BLD: 3.03 K/UL
LYMPHOCYTES RELATIVE PERCENT: 36 % (ref 24–44)
MCH RBC QN AUTO: 30.9 PG (ref 27–31)
MCHC RBC AUTO-ENTMCNC: 33.8 G/DL (ref 32–36)
MCV RBC AUTO: 91.6 FL (ref 78–100)
MONOCYTES NFR BLD: 0.65 K/UL
MONOCYTES NFR BLD: 8 % (ref 0–4)
NEUTROPHILS NFR BLD: 51 % (ref 36–66)
NEUTS SEG NFR BLD: 4.36 K/UL
PLATELET # BLD AUTO: 215 K/UL (ref 140–440)
PMV BLD AUTO: 10.6 FL (ref 7.5–11.1)
POTASSIUM SERPL-SCNC: 4 MMOL/L (ref 3.5–5.1)
PROT SERPL-MCNC: 7.3 G/DL (ref 6.4–8.2)
RBC # BLD AUTO: 4.27 M/UL (ref 4.2–5.4)
SODIUM SERPL-SCNC: 139 MMOL/L (ref 135–145)
WBC OTHER # BLD: 8.5 K/UL (ref 4–10.5)

## 2025-03-03 PROCEDURE — 85025 COMPLETE CBC W/AUTO DIFF WBC: CPT

## 2025-03-03 PROCEDURE — 74177 CT ABD & PELVIS W/CONTRAST: CPT

## 2025-03-03 PROCEDURE — 99285 EMERGENCY DEPT VISIT HI MDM: CPT

## 2025-03-03 PROCEDURE — 6360000004 HC RX CONTRAST MEDICATION: Performed by: EMERGENCY MEDICINE

## 2025-03-03 PROCEDURE — 80053 COMPREHEN METABOLIC PANEL: CPT

## 2025-03-03 PROCEDURE — 83690 ASSAY OF LIPASE: CPT

## 2025-03-03 RX ORDER — ONDANSETRON 2 MG/ML
4 INJECTION INTRAMUSCULAR; INTRAVENOUS EVERY 6 HOURS PRN
Status: DISCONTINUED | OUTPATIENT
Start: 2025-03-03 | End: 2025-03-03 | Stop reason: HOSPADM

## 2025-03-03 RX ORDER — IOPAMIDOL 755 MG/ML
100 INJECTION, SOLUTION INTRAVASCULAR
Status: COMPLETED | OUTPATIENT
Start: 2025-03-03 | End: 2025-03-03

## 2025-03-03 RX ADMIN — IOPAMIDOL 100 ML: 755 INJECTION, SOLUTION INTRAVENOUS at 17:18

## 2025-03-03 ASSESSMENT — ENCOUNTER SYMPTOMS
NAUSEA: 1
ABDOMINAL PAIN: 1

## 2025-03-03 NOTE — DISCHARGE INSTRUCTIONS
Thank you for allowing us to be involved in your care today.  Follow up as discussed during your stay. This information is included in your discharge paperwork.  Appropriate followup is essential in your continued care after today's visit. If you had any diagnostic studies ( Labs or Xray's, CAT scan, Ultrasound ) have your PCP (Primary Care Physician) review them with you since there may be results that require further follow up or investigation.    Return to the Emergency Department at any point with worsening condition or concerns.      Please follow up with your family doctor or one of your choosing.  You [facility] a provider [facility], Thank You for choosing [facility]        We are here 24 hours a day, 7 days a week, and are always here for you.      Dr. Ramez Ogden  Board Certified  Clinical Professor  Emergency Medicine

## 2025-03-03 NOTE — ED TRIAGE NOTES
Pt has been dizzy since Saturday. Had another bad dizzy spell today and took 3 baby Asprin. Also accompanied by abdominal pain. No nausea or diarrhea.

## 2025-03-03 NOTE — ED PROVIDER NOTES
Brother      Social History     Socioeconomic History    Marital status:      Spouse name: Not on file    Number of children: Not on file    Years of education: Not on file    Highest education level: Not on file   Occupational History    Not on file   Tobacco Use    Smoking status: Never    Smokeless tobacco: Never   Vaping Use    Vaping status: Never Used   Substance and Sexual Activity    Alcohol use: Yes     Comment: 1 beers per day or 2 glasses of \"bubbly\"/day,    Drug use: No    Sexual activity: Never     Partners: Male     Comment:    Other Topics Concern    Not on file   Social History Narrative    Not on file     Social Determinants of Health     Financial Resource Strain: Low Risk  (6/4/2024)    Overall Financial Resource Strain (CARDIA)     Difficulty of Paying Living Expenses: Not very hard   Food Insecurity: No Food Insecurity (9/30/2024)    Hunger Vital Sign     Worried About Running Out of Food in the Last Year: Never true     Ran Out of Food in the Last Year: Never true   Transportation Needs: No Transportation Needs (9/30/2024)    PRAPARE - Transportation     Lack of Transportation (Medical): No     Lack of Transportation (Non-Medical): No   Physical Activity: Insufficiently Active (6/21/2024)    Exercise Vital Sign     Days of Exercise per Week: 7 days     Minutes of Exercise per Session: 20 min   Stress: Not on file   Social Connections: Not on file   Intimate Partner Violence: Not on file   Housing Stability: Low Risk  (9/30/2024)    Housing Stability Vital Sign     Unable to Pay for Housing in the Last Year: No     Number of Times Moved in the Last Year: 1     Homeless in the Last Year: No     Current Facility-Administered Medications   Medication Dose Route Frequency Provider Last Rate Last Admin    ondansetron (ZOFRAN) injection 4 mg  4 mg IntraVENous Q6H PRN Ramez Ogden,          Current Outpatient Medications   Medication Sig Dispense Refill    benazepril (LOTENSIN) 20 MG

## 2025-03-03 NOTE — ED NOTES
Discharge instructions reviewed with patient. Reviewed medications with patient. No additional questions asked.  Voiced understanding. Encouraged patient to follow up as discussed by the ED physician. Reviewed how to access [employer] at discharged with patient. Encourage to sign up either on their smartphone or on the computer to be able to review the information form today's and future visits. Voiced understanding. No additional questions asked. Patient ambulatory without difficulty. Physician aware.

## 2025-03-05 ENCOUNTER — OFFICE VISIT (OUTPATIENT)
Age: 85
End: 2025-03-05

## 2025-03-05 VITALS
RESPIRATION RATE: 12 BRPM | SYSTOLIC BLOOD PRESSURE: 130 MMHG | HEART RATE: 56 BPM | DIASTOLIC BLOOD PRESSURE: 68 MMHG | OXYGEN SATURATION: 94 % | BODY MASS INDEX: 23.06 KG/M2 | WEIGHT: 130.2 LBS | TEMPERATURE: 97.8 F

## 2025-03-05 DIAGNOSIS — K21.9 GASTROESOPHAGEAL REFLUX DISEASE WITHOUT ESOPHAGITIS: ICD-10-CM

## 2025-03-05 DIAGNOSIS — I47.20 VENTRICULAR TACHYCARDIA (HCC): Primary | ICD-10-CM

## 2025-03-05 DIAGNOSIS — E03.9 ACQUIRED HYPOTHYROIDISM: ICD-10-CM

## 2025-03-05 DIAGNOSIS — K52.9 COLITIS: ICD-10-CM

## 2025-03-05 DIAGNOSIS — K76.89 LIVER DYSFUNCTION: ICD-10-CM

## 2025-03-05 DIAGNOSIS — M81.0 AGE-RELATED OSTEOPOROSIS WITHOUT CURRENT PATHOLOGICAL FRACTURE: ICD-10-CM

## 2025-03-05 DIAGNOSIS — I10 ESSENTIAL HYPERTENSION: ICD-10-CM

## 2025-03-05 DIAGNOSIS — E78.2 MIXED HYPERLIPIDEMIA: ICD-10-CM

## 2025-03-05 PROBLEM — R07.9 CHEST PAIN: Status: RESOLVED | Noted: 2024-09-30 | Resolved: 2025-03-05

## 2025-03-05 RX ORDER — FAMOTIDINE 20 MG/1
20 TABLET, FILM COATED ORAL DAILY
Qty: 30 TABLET | Refills: 3 | Status: SHIPPED | OUTPATIENT
Start: 2025-03-05

## 2025-03-05 RX ORDER — TRIAMCINOLONE ACETONIDE 1 MG/G
CREAM TOPICAL
COMMUNITY
Start: 2025-02-06

## 2025-03-05 ASSESSMENT — PATIENT HEALTH QUESTIONNAIRE - PHQ9
2. FEELING DOWN, DEPRESSED OR HOPELESS: SEVERAL DAYS
1. LITTLE INTEREST OR PLEASURE IN DOING THINGS: NOT AT ALL
SUM OF ALL RESPONSES TO PHQ QUESTIONS 1-9: 1

## 2025-03-05 NOTE — PROGRESS NOTES
Brenda Ye  Patient's  is 1940  Seen in office on 3/5/2025      SUBJECTIVE:  Brenda brink 84 y.o.year old female presents today   Chief Complaint   Patient presents with    Hypertension    Follow-up     ED visit 3-3-25     History of Present Illness  The patient is an 84-year-old female who presents for evaluation of dizziness, abdominal pain, PVCs, hypertension, hypothyroidism, and gastritis.    She experienced a sudden onset of vertigo on the morning of 2025 while lying on her right side, which was accompanied by fatigue and instability upon standing. The episode lasted for a brief period, causing significant distress. She sought emergency care on 2025, by which time the dizziness had subsided, but she continued to experience fatigue. She expresses concern about a potential recurrence of the episode and fears that it may be indicative of a stroke.Patient denies any focal weakness.  No slurred speech.  No blurred vision or double vision.  She is ambulatory    She also reported severe abdominal pain during this period, a symptom that had been present prior to the vertigo episode. She has a history of ovarian cysts, diagnosed via ultrasound in 2024, and has undergone blood tests under the care of Dr. Peterson, a gynecologist. The results of these tests were within normal limits, and she was reassured that her condition was stable.    She continues to experience premature ventricular contractions (PVCs), although they are not perceptible unless she palpates her chest. She reports persistent fatigue, which she attributes to her advanced age.    She is currently on a daily regimen of atorvastatin 10 mg for cholesterol management.    Her blood pressure is well-controlled with daily benazepril, and she takes hydralazine as needed for elevated blood pressure.    She is on a daily dose of Synthroid 50 mcg for thyroid management.    She has discontinued the use of Protonix due to concerns about potential

## 2025-04-21 DIAGNOSIS — E03.9 ACQUIRED HYPOTHYROIDISM: ICD-10-CM

## 2025-04-21 DIAGNOSIS — E78.2 MIXED HYPERLIPIDEMIA: ICD-10-CM

## 2025-04-21 RX ORDER — LEVOTHYROXINE SODIUM 50 UG/1
50 TABLET ORAL DAILY
Qty: 90 TABLET | Refills: 1 | Status: SHIPPED | OUTPATIENT
Start: 2025-04-21

## 2025-04-21 RX ORDER — ATORVASTATIN CALCIUM 10 MG/1
10 TABLET, FILM COATED ORAL DAILY
Qty: 90 TABLET | Refills: 1 | Status: SHIPPED | OUTPATIENT
Start: 2025-04-21

## 2025-04-29 RX ORDER — BENAZEPRIL HYDROCHLORIDE 20 MG/1
20 TABLET ORAL DAILY
Qty: 90 TABLET | Refills: 1 | Status: SHIPPED | OUTPATIENT
Start: 2025-04-29

## 2025-05-28 LAB — MAMMOGRAPHY, EXTERNAL: NORMAL

## 2025-06-10 ENCOUNTER — OFFICE VISIT (OUTPATIENT)
Age: 85
End: 2025-06-10

## 2025-06-10 VITALS
TEMPERATURE: 98 F | HEART RATE: 56 BPM | WEIGHT: 129 LBS | RESPIRATION RATE: 16 BRPM | BODY MASS INDEX: 22.85 KG/M2 | DIASTOLIC BLOOD PRESSURE: 60 MMHG | SYSTOLIC BLOOD PRESSURE: 118 MMHG | OXYGEN SATURATION: 99 %

## 2025-06-10 DIAGNOSIS — N83.201 CYST OF RIGHT OVARY: ICD-10-CM

## 2025-06-10 DIAGNOSIS — M81.0 AGE-RELATED OSTEOPOROSIS WITHOUT CURRENT PATHOLOGICAL FRACTURE: ICD-10-CM

## 2025-06-10 DIAGNOSIS — I49.3 PVCS (PREMATURE VENTRICULAR CONTRACTIONS): ICD-10-CM

## 2025-06-10 DIAGNOSIS — I10 ESSENTIAL HYPERTENSION: ICD-10-CM

## 2025-06-10 DIAGNOSIS — E78.2 MIXED HYPERLIPIDEMIA: Primary | ICD-10-CM

## 2025-06-10 DIAGNOSIS — E03.9 ACQUIRED HYPOTHYROIDISM: ICD-10-CM

## 2025-06-10 DIAGNOSIS — I47.20 VENTRICULAR TACHYCARDIA (HCC): ICD-10-CM

## 2025-06-10 DIAGNOSIS — K76.89 LIVER DYSFUNCTION: ICD-10-CM

## 2025-06-10 DIAGNOSIS — D03.62 MELANOMA IN SITU OF LEFT UPPER EXTREMITY INCLUDING SHOULDER (HCC): ICD-10-CM

## 2025-06-10 DIAGNOSIS — K58.1 IRRITABLE BOWEL SYNDROME WITH CONSTIPATION: ICD-10-CM

## 2025-06-10 RX ORDER — DICYCLOMINE HYDROCHLORIDE 10 MG/1
10 CAPSULE ORAL 3 TIMES DAILY PRN
Qty: 90 CAPSULE | Refills: 1 | Status: SHIPPED | OUTPATIENT
Start: 2025-06-10

## 2025-06-10 ASSESSMENT — PATIENT HEALTH QUESTIONNAIRE - PHQ9
SUM OF ALL RESPONSES TO PHQ QUESTIONS 1-9: 0
2. FEELING DOWN, DEPRESSED OR HOPELESS: NOT AT ALL
SUM OF ALL RESPONSES TO PHQ QUESTIONS 1-9: 0
1. LITTLE INTEREST OR PLEASURE IN DOING THINGS: NOT AT ALL
SUM OF ALL RESPONSES TO PHQ QUESTIONS 1-9: 0
SUM OF ALL RESPONSES TO PHQ QUESTIONS 1-9: 0

## 2025-06-10 NOTE — PROGRESS NOTES
Substance Use Topics    Alcohol use: Yes     Comment: 1 beers per day or 2 glasses of \"bubbly\"/day,       LAB REVIEW:  CBC:   Lab Results   Component Value Date/Time    WBC 8.5 03/03/2025 04:50 PM    HGB 13.2 03/03/2025 04:50 PM    HCT 39.1 03/03/2025 04:50 PM     03/03/2025 04:50 PM     Lipids:   Lab Results   Component Value Date    HDL 72 02/28/2025    LDLDIRECT 64 12/04/2020    TRIGLYCFAST 50 02/28/2025    CHOLFAST 150 02/28/2025     Renal:   Lab Results   Component Value Date/Time    BUN 28 03/03/2025 04:50 PM    CREATININE 0.9 03/03/2025 04:50 PM     03/03/2025 04:50 PM    K 4.0 03/03/2025 04:50 PM    ALKPHOS 76 03/03/2025 04:50 PM    ALT 23 03/03/2025 04:50 PM    AST 30 03/03/2025 04:50 PM    GLUCOSE 101 03/03/2025 04:50 PM    GLUF 105 03/16/2022 08:40 AM     PT/INR:   Lab Results   Component Value Date/Time    INR 0.91 10/17/2017 10:51 AM     A1C:   Lab Results   Component Value Date    LABA1C 5.8 02/28/2025           Dorie Velazquez MD, 6/10/2025 , 10:39 AM

## 2025-06-17 ENCOUNTER — HOSPITAL ENCOUNTER (OUTPATIENT)
Dept: DIABETES SERVICES | Age: 85
Setting detail: THERAPIES SERIES
Discharge: HOME | End: 2025-06-17
Payer: MEDICARE

## 2025-06-17 PROCEDURE — 97802 MEDICAL NUTRITION INDIV IN: CPT

## 2025-06-17 NOTE — PROGRESS NOTES
Outpatient Medical Nutrition Therapy    2025  10:30-11:30     Reason for referral: IBS with constipation (K58.1)    Client History:    Pertinent medications:   Current Outpatient Medications   Medication Instructions    aspirin 81 mg, DAILY    atorvastatin (LIPITOR) 10 mg, Oral, DAILY    benazepril (LOTENSIN) 20 mg, Oral, DAILY    Cholecalciferol (VITAMIN D3) 2000 UNITS CAPS 1 capsule, DAILY    Coenzyme Q10 (CO Q 10) 100 MG CAPS DAILY    dicyclomine (BENTYL) 10 mg, Oral, 3 TIMES DAILY PRN    docusate sodium (COLACE) 100 mg, Oral, DAILY PRN    famotidine (PEPCID) 20 mg, Oral, DAILY    fluticasone (FLONASE) 50 MCG/ACT nasal spray 2 sprays, DAILY PRN    hydrALAZINE (APRESOLINE) 10 MG tablet Take 10 mg tablet if systolic BP is >160 . Check BP three times a day    lactobacillus (CULTURELLE) capsule 1 capsule, Oral, 2 TIMES DAILY WITH MEALS    levothyroxine (SYNTHROID) 50 mcg, Oral, DAILY    Magnesium 250 mg, Oral, DAILY    triamcinolone (KENALOG) 0.1 % cream Apply topically      Currently not taking bentyl or pepcid due to fear of side effects.      Social hx: Lives alone, spouse . Adult son in [place], other son preparing to move from [place] to [place].      Pertinent Medical hx:   Acute deep vein thrombosis (DVT) of distal vein of left lower   extremity (HCC)  Axillary adenopathy  C. difficile diarrhea  CAD (coronary artery disease)  Chest discomfort  Colitis  Cyst of right ovary  Dizzy spells  H/O 24 hour EKG monitoring  H/O colonoscopy  H/O echocardiogram  H/O mitral valve prolapse  H/O myocardial perfusion scan  H/O screening mammography  History of cardiac monitoring  History of cardiac monitoring  Hx of Doppler ultrasound  Hyperlipidemia  Hypertension  Hypothyroidism  Liver dysfunction  Melanoma in situ of left upper extremity including shoulder (HCC)  Osteoarthritis of cervical spine  Ovarian cyst  Papanicolaou smear  PVCs (premature ventricular contractions)  Ventricular tachycardia

## 2025-06-26 ENCOUNTER — TELEMEDICINE (OUTPATIENT)
Age: 85
End: 2025-06-26

## 2025-06-26 DIAGNOSIS — Z00.00 MEDICARE ANNUAL WELLNESS VISIT, SUBSEQUENT: Primary | ICD-10-CM

## 2025-06-26 ASSESSMENT — PATIENT HEALTH QUESTIONNAIRE - PHQ9
SUM OF ALL RESPONSES TO PHQ QUESTIONS 1-9: 0
1. LITTLE INTEREST OR PLEASURE IN DOING THINGS: NOT AT ALL
SUM OF ALL RESPONSES TO PHQ QUESTIONS 1-9: 0
2. FEELING DOWN, DEPRESSED OR HOPELESS: NOT AT ALL

## 2025-06-26 NOTE — PATIENT INSTRUCTIONS
aspirin. Wait for an ambulance. Do not try to drive yourself.  Watch closely for changes in your health, and be sure to contact your doctor if you have any problems.  Where can you learn more?  Go to https://www.[employer].net/patientEd and enter F075 to learn more about \"A Healthy Heart: Care Instructions.\"  Current as of: July 31, 2024  Content Version: 14.5  © 7089-6210 [employer].   Care instructions adapted under license by [employer]. If you have questions about a medical condition or this instruction, always ask your healthcare professional. [employer] disclaims any warranty or liability for your use of this information.    Personalized Preventive Plan for Brenda Ye - 6/26/2025  Medicare offers a range of preventive health benefits. Some of the tests and screenings are paid in full while other may be subject to a deductible, co-insurance, and/or copay.  Some of these benefits include a comprehensive review of your medical history including lifestyle, illnesses that may run in your family, and various assessments and screenings as appropriate.  After reviewing your medical record and screening and assessments performed today your provider may have ordered immunizations, labs, imaging, and/or referrals for you.  A list of these orders (if applicable) as well as your Preventive Care list are included within your After Visit Summary for your review.

## 2025-07-03 RX ORDER — DICYCLOMINE HYDROCHLORIDE 10 MG/1
CAPSULE ORAL
Qty: 270 CAPSULE | Refills: 1 | OUTPATIENT
Start: 2025-07-03

## 2025-07-08 ENCOUNTER — TELEPHONE (OUTPATIENT)
Age: 85
End: 2025-07-08

## 2025-07-08 NOTE — TELEPHONE ENCOUNTER
Pt calling in stating when she was in in April you had mentioned something about her bowel being an issue. She states she does not remember what was said and is asking if you do? Please advise. LH

## 2025-07-08 NOTE — TELEPHONE ENCOUNTER
When I saw her for her ultrasound in April I was pleased that her ovaries look normal and if she was having lower abdominal discomfort/issues I suspected that her bowels were the culprit.  I did not find anything to be amiss with my ultrasound.  I was simply making the point that pelvic pain that is not gynecologic in nature is most frequently GI related pain.

## 2025-07-10 ENCOUNTER — OFFICE VISIT (OUTPATIENT)
Dept: CARDIOLOGY CLINIC | Age: 85
End: 2025-07-10
Payer: MEDICARE

## 2025-07-10 VITALS
BODY MASS INDEX: 23.35 KG/M2 | WEIGHT: 131.8 LBS | HEIGHT: 63 IN | DIASTOLIC BLOOD PRESSURE: 74 MMHG | SYSTOLIC BLOOD PRESSURE: 118 MMHG | HEART RATE: 76 BPM

## 2025-07-10 DIAGNOSIS — I49.3 PVCS (PREMATURE VENTRICULAR CONTRACTIONS): ICD-10-CM

## 2025-07-10 DIAGNOSIS — I10 ESSENTIAL HYPERTENSION: Primary | ICD-10-CM

## 2025-07-10 DIAGNOSIS — R06.02 SHORTNESS OF BREATH: ICD-10-CM

## 2025-07-10 PROCEDURE — 99214 OFFICE O/P EST MOD 30 MIN: CPT | Performed by: INTERNAL MEDICINE

## 2025-07-10 PROCEDURE — 1159F MED LIST DOCD IN RCRD: CPT | Performed by: INTERNAL MEDICINE

## 2025-07-10 PROCEDURE — G8420 CALC BMI NORM PARAMETERS: HCPCS | Performed by: INTERNAL MEDICINE

## 2025-07-10 PROCEDURE — 93000 ELECTROCARDIOGRAM COMPLETE: CPT | Performed by: INTERNAL MEDICINE

## 2025-07-10 PROCEDURE — 1124F ACP DISCUSS-NO DSCNMKR DOCD: CPT | Performed by: INTERNAL MEDICINE

## 2025-07-10 PROCEDURE — G8427 DOCREV CUR MEDS BY ELIG CLIN: HCPCS | Performed by: INTERNAL MEDICINE

## 2025-07-10 PROCEDURE — 1036F TOBACCO NON-USER: CPT | Performed by: INTERNAL MEDICINE

## 2025-07-10 PROCEDURE — 1090F PRES/ABSN URINE INCON ASSESS: CPT | Performed by: INTERNAL MEDICINE

## 2025-07-10 NOTE — PROGRESS NOTES
CARDIOLOGY NOTE      7/10/2025    RE: Bredna Ye  (1940)                               TO:  Dorie Clark MD            CHIEF COMPLAINT   Brenda is a 84 y.o. female who was seen today for management of  pvcs                                    HPI:                   Pt has h/o htn, hyperlipidimea, PVCs, seen today for  fu. Pt has  No cardiac complains    Brenda Ye has the following history recorded in care path:  Patient Active Problem List    Diagnosis Date Noted    Transaminasemia 08/09/2023    Melanoma in situ of left upper extremity including shoulder (HCC) 05/18/2023    Age-related osteoporosis without current pathological fracture 08/31/2022    Gastroesophageal reflux disease without esophagitis 11/28/2017    Ventricular tachycardia (HCC) 08/01/2017    Cyst of right ovary 09/06/2016    Heart palpitations 11/20/2014    Colitis 11/14/2013    Essential hypertension     Mixed hyperlipidemia     PVCs (premature ventricular contractions)     H/O mitral valve prolapse     Liver dysfunction     Acquired hypothyroidism      Current Outpatient Medications   Medication Sig Dispense Refill    dicyclomine (BENTYL) 10 MG capsule Take 1 capsule by mouth 3 times daily as needed (abdominal pain) 90 capsule 1    benazepril (LOTENSIN) 20 MG tablet TAKE 1 TABLET BY MOUTH EVERY DAY 90 tablet 1    atorvastatin (LIPITOR) 10 MG tablet TAKE 1 TABLET BY MOUTH EVERY DAY 90 tablet 1    levothyroxine (SYNTHROID) 50 MCG tablet TAKE 1 TABLET BY MOUTH EVERY DAY 90 tablet 1    triamcinolone (KENALOG) 0.1 % cream Apply topically      hydrALAZINE (APRESOLINE) 10 MG tablet Take 10 mg tablet if systolic BP is >160 . Check BP three times a day 90 tablet 3    fluticasone (FLONASE) 50 MCG/ACT nasal spray 2 sprays by Each Nostril route daily as needed for Rhinitis or Allergies      docusate sodium (COLACE) 100 MG capsule Take 1 capsule by mouth daily as needed for Constipation 30 capsule 5    Magnesium 125 MG CAPS Take 250 mg

## 2025-07-10 NOTE — PROGRESS NOTES
CLINICAL STAFF DOCUMENTATION    Dr. Stiven Ye  1940  [id number]    Have you had any Chest Pain recently? - No    Have you had any Shortness of Breath - No    Have you had any dizziness - No    Have you had any palpitations recently? - No    Do you have any edema - swelling in No      Is the patient on any of the following medications -     When did you have your last labs drawn 3/2025  What doctor ordered Melvi   Do we have the labs in their chart Yes    Do you need any prescriptions refilled? - No    Do use tobacco products? - No  Do you drink alcohol? - Yes  Do you use any illicit drugs? - No  Caffeine? - No  How much caffeine? .         Check medication list thoroughly!!! AND RECONCILE OUTSIDE MEDICATIONS  If dose has changed change the entire order not just the MG  BE SURE TO ASK PATIENT IF THEY NEED MEDICATION REFILLS  Verify Pharmacy and update if incorrect    Add to every patient's \"wrap up\" the following dot phrase AFTERVISITCARDIOHEARTHOUSE and ensure we explain this to our patients

## 2025-07-10 NOTE — PATIENT INSTRUCTIONS
Thank you for allowing us to care for you today!   We want to ensure we can follow your treatment plan and we strive to give you the best outcomes and experience possible.   If you ever have a life threatening emergency and call 911 - for an ambulance (EMS)  REMEMBER  Our providers can only care for you at:   [facility] or [facility]   Even if you have someone take you or you drive yourself we can only care for you in a [facility] facility. Our providers are not setup at the other healthcare locations!    PLEASE CALL OUR OFFICE DURING NORMAL BUSINESS HOURS  Monday through Friday 8 am to 5 pm  AFTER HOURS the physician on-call cannot help with scheduling, rescheduling, procedure instruction questions or any type of medication need or issue.  [facility] P:[phone] - [facility] P:[phone] - [facility] P:[phone]      If you receive a survey:  We would appreciate you taking the time to share your experience concerning your provider visit in the office.    These surveys are confidential!  We are eager to improve and are counting on you to share your feedback so we can ensure you get the best care possible.

## 2025-08-12 ENCOUNTER — TELEPHONE (OUTPATIENT)
Dept: CARDIOLOGY CLINIC | Age: 85
End: 2025-08-12

## 2025-08-25 ENCOUNTER — TELEPHONE (OUTPATIENT)
Age: 85
End: 2025-08-25

## 2025-08-26 DIAGNOSIS — R10.9 ABDOMINAL PAIN, UNSPECIFIED ABDOMINAL LOCATION: Primary | ICD-10-CM

## 2025-09-03 ENCOUNTER — TELEPHONE (OUTPATIENT)
Dept: GASTROENTEROLOGY | Age: 85
End: 2025-09-03